# Patient Record
Sex: FEMALE | Race: BLACK OR AFRICAN AMERICAN | NOT HISPANIC OR LATINO | Employment: OTHER | URBAN - METROPOLITAN AREA
[De-identification: names, ages, dates, MRNs, and addresses within clinical notes are randomized per-mention and may not be internally consistent; named-entity substitution may affect disease eponyms.]

---

## 2018-01-18 ENCOUNTER — TELEPHONE (OUTPATIENT)
Dept: PLASTIC SURGERY | Facility: CLINIC | Age: 78
End: 2018-01-18

## 2018-01-18 NOTE — TELEPHONE ENCOUNTER
----- Message from Neva Godinez sent at 1/18/2018 11:45 AM CST -----  Contact: Ms.Jessica Aburto [facility] [phone] Ext [phone]   Caller states she would like to speak to nurse in reference to scheduling Appt. From referral please call Ms.Jessica Aburto [facility] back @ [phone] Ext [phone]  Thank You :)

## 2018-01-18 NOTE — TELEPHONE ENCOUNTER
Returned call to Dr Schmitt office, patient has a referral Dr Mcguire for a new breast lesion, referral scanned into system, patient does not have insurance updated in system at this time, will call patient to schedule per referral

## 2018-01-19 ENCOUNTER — TELEPHONE (OUTPATIENT)
Dept: PLASTIC SURGERY | Facility: CLINIC | Age: 78
End: 2018-01-19

## 2018-01-19 NOTE — TELEPHONE ENCOUNTER
Left voice message stating the office number and hours of operation to call for an appointment.    ----- Message from Yue Britton RN sent at 1/18/2018  2:30 PM CST -----  Regarding: call to schedule  Please call patient to schedule for a new breast lesion    Referral scanned into media    Need to verify patients insurance before scheduling

## 2018-01-31 ENCOUNTER — OFFICE VISIT (OUTPATIENT)
Dept: PLASTIC SURGERY | Facility: CLINIC | Age: 78
End: 2018-01-31
Payer: MEDICARE

## 2018-01-31 VITALS
TEMPERATURE: 99 F | RESPIRATION RATE: 18 BRPM | WEIGHT: 161.94 LBS | DIASTOLIC BLOOD PRESSURE: 66 MMHG | SYSTOLIC BLOOD PRESSURE: 118 MMHG | HEIGHT: 62 IN | BODY MASS INDEX: 29.8 KG/M2 | HEART RATE: 83 BPM

## 2018-01-31 DIAGNOSIS — L91.0 KELOID: Primary | ICD-10-CM

## 2018-01-31 PROCEDURE — 1159F MED LIST DOCD IN RCRD: CPT | Mod: ,,, | Performed by: SURGERY

## 2018-01-31 PROCEDURE — 99213 OFFICE O/P EST LOW 20 MIN: CPT | Mod: PBBFAC,PO | Performed by: SURGERY

## 2018-01-31 PROCEDURE — 99999 PR PBB SHADOW E&M-EST. PATIENT-LVL III: CPT | Mod: PBBFAC,,, | Performed by: SURGERY

## 2018-01-31 PROCEDURE — 99202 OFFICE O/P NEW SF 15 MIN: CPT | Mod: S$PBB,,, | Performed by: SURGERY

## 2018-01-31 PROCEDURE — 1126F AMNT PAIN NOTED NONE PRSNT: CPT | Mod: ,,, | Performed by: SURGERY

## 2018-01-31 RX ORDER — ESOMEPRAZOLE MAGNESIUM 40 MG/1
40 CAPSULE, DELAYED RELEASE ORAL
COMMUNITY
Start: 2018-01-10 | End: 2022-02-13

## 2018-01-31 RX ORDER — OMEPRAZOLE 40 MG/1
40 CAPSULE, DELAYED RELEASE ORAL DAILY
COMMUNITY
End: 2018-04-13

## 2018-01-31 RX ORDER — METFORMIN HYDROCHLORIDE 500 MG/1
500 TABLET ORAL 2 TIMES DAILY WITH MEALS
Status: ON HOLD | COMMUNITY
End: 2018-04-18

## 2018-01-31 RX ORDER — OLMESARTAN MEDOXOMIL / AMLODIPINE BESYLATE / HYDROCHLOROTHIAZIDE 40; 10; 12.5 MG/1; MG/1; MG/1
TABLET, FILM COATED ORAL DAILY
COMMUNITY
Start: 2018-01-10 | End: 2022-02-13

## 2018-01-31 RX ORDER — ETODOLAC 400 MG/1
400 TABLET, FILM COATED ORAL 2 TIMES DAILY
Status: ON HOLD | COMMUNITY
Start: 2018-01-10 | End: 2018-04-18 | Stop reason: HOSPADM

## 2018-01-31 RX ORDER — CLONIDINE HYDROCHLORIDE 0.1 MG/1
0.1 TABLET ORAL 2 TIMES DAILY
COMMUNITY

## 2018-01-31 RX ORDER — SIMVASTATIN 40 MG/1
40 TABLET, FILM COATED ORAL
COMMUNITY
End: 2018-04-13

## 2018-01-31 RX ORDER — TRAVOPROST 0.04 MG/ML
1 SOLUTION/ DROPS OPHTHALMIC 2 TIMES DAILY
COMMUNITY
Start: 2018-01-10

## 2018-01-31 NOTE — LETTER
January 31, 2018      Andrew Francisco IV, MD  [address]   [place] [address]           [facility] [place]  [address] [facility] [address]  Phone: [phone]  Fax: [phone]          Patient: Azul Clifford   MR Number: [id number]   YOB: 1940   Date of Visit: 1/31/2018       Dear Dr. Andrew Francisco IV:    Thank you for referring Azul Clifford to me for evaluation. Attached you will find relevant portions of my assessment and plan of care.    If you have questions, please do not hesitate to call me. I look forward to following Azul Clifford along with you.    Sincerely,    Ezekiel Mcguire MD    Enclosure  CC:  No Recipients    If you would like to receive this communication electronically, please contact [email or web address] or [phone] to request more information on [employer] Link access.    For providers and/or their staff who would like to refer a patient to [facility], please contact us through our one-stop-shop provider referral line, [facility] [place], at [phone].    If you feel you have received this communication in error or would no longer like to receive these types of communications, please e-mail [email or web address]

## 2018-01-31 NOTE — PROGRESS NOTES
Plastic Surgery History and Physical    HPI:  Azul Clifford 77 y.o. female here today to discuss keloids on the R breast. She doesn't remember any trauma to the area.       PMH:  No past medical history on file.    PSH:  No past surgical history on file.    SH:  Tobacco:   History   Smoking Status    Not on file   Smokeless Tobacco    Not on file     ETOH:   History   Alcohol use Not on file     Illicit Drugs:   History   Drug use: Unknown       Medications    Current Outpatient Prescriptions:     cloNIDine (CATAPRES) 0.1 MG tablet, Take 0.1 mg by mouth 2 (two) times daily., Disp: , Rfl:     esomeprazole (NEXIUM) 40 MG capsule, , Disp: , Rfl:     etodolac (LODINE) 400 MG tablet, , Disp: , Rfl:     metFORMIN (GLUCOPHAGE) 500 MG tablet, Take 500 mg by mouth., Disp: , Rfl:     olmesartan-amLODIPin-hcthiazid 40-10-12.5 mg Tab, , Disp: , Rfl:     omeprazole (PRILOSEC) 40 MG capsule, Take 40 mg by mouth once daily., Disp: , Rfl:     simvastatin (ZOCOR) 40 MG tablet, Take 40 mg by mouth., Disp: , Rfl:     TRAVATAN Z 0.004 % Drop, , Disp: , Rfl:     Physical Exam  General: NAD, Resting comfortably in bed  Chest: CTA Bl  Heart: RRR  Abd: Soft, NT, ND  Breast: R breast with two parallel keloids    Diagnostics:  No results found for: WBC, HGB, HCT, MCV, PLT  No results found for: CREATININE, BUN, NA, K, CL, CO2  No results found for: ALT, AST, GGT, ALKPHOS, BILITOT  No results found for: ALBUMIN      Assessment/Plan  Azul Clifford 77 y.o. female with     1. Will refer to rad onc for eval and then get her on the schedule.

## 2018-02-14 ENCOUNTER — TELEPHONE (OUTPATIENT)
Dept: PLASTIC SURGERY | Facility: CLINIC | Age: 78
End: 2018-02-14

## 2018-02-14 DIAGNOSIS — L91.0 KELOID SCAR: Primary | ICD-10-CM

## 2018-02-27 ENCOUNTER — ANESTHESIA EVENT (OUTPATIENT)
Dept: SURGERY | Facility: HOSPITAL | Age: 78
End: 2018-02-27
Payer: MEDICARE

## 2018-02-27 NOTE — PRE ADMISSION SCREENING
Anesthesia Assessment: Preoperative EQUATION    Planned Procedure: Procedure(s) (LRB):  EXCISION-KELOID (Right)  Requested Anesthesia Type:General/MAC  Surgeon: Ezekiel Mcguire MD  Service: Plastics  Known or anticipated Date of Surgery:3/13/2018    Surgeon notes: reviewed    Electronic QUestionnaire Assessment completed via nurse interview with patient.        No Aq        Triage considerations:     Previous anesthesia records:Not available (states eye surgery    Last PCP note: within 3 months , outside [facility] Dr. PARESH Carter  Subspecialty notes: pt has some records in care everwhere    Other important co-morbidities:   Skin lesion breast  Essential Htn  HLD  Arthritis  glaucoma     Tests already available:  No recent tests.            Instructions given. (See in Nurse's note)    Optimization:  Requesting records from PCP    Plan:    Testing:  Hemoglobin     Navigation: 77 yr old female lives alone in [place] Does not recognize all meds on MAR.  Requesting additional records from outside.

## 2018-02-27 NOTE — ANESTHESIA PREPROCEDURE EVALUATION
Anesthesia Assessment: Preoperative EQUATION     Planned Procedure: Procedure(s) (LRB):  EXCISION-KELOID (Right) (right axilla)  Requested Anesthesia Type:General/MAC  Surgeon: Ezekiel Mcguire MD  Service: Plastics  Known or anticipated Date of Surgery:3/13/2018     Surgeon notes: reviewed     Electronic QUestionnaire Assessment completed via nurse interview with patient.         No Aq           Triage considerations:      Previous anesthesia records:Not available (states eye surgery     Last PCP note: within 3 months , outside [facility] Dr. PARESH Carter  Subspecialty notes: pt has some records in care everwhere     Other important co-morbidities:   Skin lesion breast  Essential Htn  HLD  Arthritis  glaucoma   DM2    Tests already available:  No recent tests.                            Instructions given. (See in Nurse's note)     Optimization:  Requesting records from PCP         Plan:    Testing:  Hemoglobin     Navigation: 77 yr old female lives alone in [place] Does not recognize all meds on MAR.  Requesting additional records from outside.                                                                                                          02/27/2018  Azul Clifford is a 77 y.o., female     Other important co-morbidities:   Skin lesion breast  Essential Htn  HLD  Arthritis  glaucoma    Anesthesia Evaluation    I have reviewed the Patient Summary Reports.    I have reviewed the Nursing Notes.   I have reviewed the Medications.     Review of Systems  Anesthesia Hx:  No problems with previous Anesthesia  History of prior surgery of interest to airway management or planning: Previous anesthesia: MAC  Eye surgery / unknown with MAC.   Denies Personal Hx of Anesthesia complications.   Social:  No Alcohol Use, Non-Smoker    Hematology/Oncology:  Hematology Normal   Oncology Normal     EENT/Dental:   glaucoma   Cardiovascular:   Exercise tolerance: poor Hypertension hyperlipidemia     Pulmonary:  Pulmonary Normal    Renal/:  Renal/ Normal     Hepatic/GI:  Hepatic/GI Normal    Musculoskeletal:   Arthritis  States had some c/o neck pain, some trouble turning head   Neurological:  Neurology Normal    Endocrine:   Diabetes, type 2 Reported, pt does not check blood sugars   Dermatological:   Keloid scar   Psych:  Psychiatric Normal           Physical Exam  General:  Well nourished    Airway/Jaw/Neck:  Airway Findings: Mouth Opening: Normal Tongue: Normal  General Airway Assessment: Adult  Oropharynx Findings: Normal Mallampati: III  Improves to II with phonation.  TM Distance: Normal, at least 6 cm  Jaw/Neck Findings:  Neck ROM: Normal ROM  Neck Findings: Normal    Eyes/Ears/Nose:  EYES/EARS/NOSE FINDINGS: Normal   Dental:  Dental Findings: Edentulous   Chest/Lungs:  Chest/Lungs Findings: Normal Respiratory Rate     Heart/Vascular:  Heart Findings: Rate: Normal        Mental Status:  Mental Status Findings:  Cooperative, Alert and Oriented         Anesthesia Plan  Type of Anesthesia, risks & benefits discussed:  Anesthesia Type:  general  Patient's Preference:   Intra-op Monitoring Plan: standard ASA monitors  Intra-op Monitoring Plan Comments:   Post Op Pain Control Plan: per primary service following discharge from PACU, multimodal analgesia and IV/PO Opioids PRN  Post Op Pain Control Plan Comments:   Induction:   IV  Beta Blocker:  Patient is not currently on a Beta-Blocker (No further documentation required).       Informed Consent: Patient understands risks and agrees with Anesthesia plan.  Questions answered. Anesthesia consent signed with patient.  ASA Score: 2     Day of Surgery Review of History & Physical:    H&P update referred to the surgeon.     Anesthesia Plan Notes:   77F DM2, HTN, axillary keloid for resection under GA NC TIVA        Ready For Surgery From Anesthesia Perspective.

## 2018-03-01 ENCOUNTER — HOSPITAL ENCOUNTER (OUTPATIENT)
Dept: RADIATION THERAPY | Facility: HOSPITAL | Age: 78
Discharge: HOME | End: 2018-03-01
Attending: RADIOLOGY
Payer: MEDICARE

## 2018-03-07 ENCOUNTER — OFFICE VISIT (OUTPATIENT)
Dept: RADIATION ONCOLOGY | Facility: CLINIC | Age: 78
End: 2018-03-07
Attending: RADIOLOGY
Payer: MEDICARE

## 2018-03-07 VITALS
RESPIRATION RATE: 18 BRPM | DIASTOLIC BLOOD PRESSURE: 57 MMHG | BODY MASS INDEX: 29.28 KG/M2 | WEIGHT: 159.13 LBS | HEIGHT: 62 IN | SYSTOLIC BLOOD PRESSURE: 101 MMHG | HEART RATE: 88 BPM

## 2018-03-07 DIAGNOSIS — L91.0 KELOID CICATRIX: Primary | ICD-10-CM

## 2018-03-07 PROCEDURE — 99999 PR PBB SHADOW E&M-EST. PATIENT-LVL III: CPT | Mod: PBBFAC,,, | Performed by: RADIOLOGY

## 2018-03-07 PROCEDURE — 99213 OFFICE O/P EST LOW 20 MIN: CPT | Mod: PBBFAC | Performed by: RADIOLOGY

## 2018-03-07 PROCEDURE — 99201 PR OFFICE/OUTPT VISIT,NEW,LEVL I: CPT | Mod: S$PBB,,, | Performed by: RADIOLOGY

## 2018-03-07 RX ORDER — ASPIRIN 81 MG/1
81 TABLET ORAL DAILY
COMMUNITY

## 2018-03-07 NOTE — LETTER
March 7, 2018      Ezekiel Mcguire MD  [address]  [facility] [address]           [facility]  [address]  [facility] [address]  Phone: [phone]          Patient: Azul Clifford   MR Number: [id number]   YOB: 1940   Date of Visit: 3/7/2018       Dear Dr. Ezekiel Mcguire:    Thank you for referring Azul Clifford to me for evaluation. Attached you will find relevant portions of my assessment and plan of care.    If you have questions, please do not hesitate to call me. I look forward to following Azul Clifford along with you.    Sincerely,    Naresh Willams Jr., MD    Enclosure  CC:  No Recipients    If you would like to receive this communication electronically, please contact [email or web address] or [phone] to request more information on [employer] access.    For providers and/or their staff who would like to refer a patient to [facility], please contact us through our one-stop-shop provider referral line, [facility], at [phone].    If you feel you have received this communication in error or would no longer like to receive these types of communications, please e-mail [email or web address]

## 2018-03-07 NOTE — PROGRESS NOTES
Subjective:       Patient ID: Azul Clifford is a 77 y.o. female.    Chief Complaint: Keloid (discuss xrt-rt breast;sx 3/13)    This patient is referred for discussion of postoperative radiotherapy for prevention of keloid formation.      Mrs. Clifford as a history of keloid formation in the region of the Rt. axillary tail following treatment of abscess in the area.  The patient complains of pain with the scars and occasional drainage.  She is scheduled from removal of the scars and presents for discussion of postoperative radiotherapy.  Today, the patient states she feels well.        Review of Systems   Constitutional: Negative for activity change, appetite change and chills.   Skin: Negative for color change and pallor.       Objective:      Physical Exam   Constitutional: She appears well-developed and well-nourished. No distress.   Pulmonary/Chest:           Assessment:       1. Keloid cicatrix        Plan:       Discussed the rational for postoperative radiotherapy following resection of these scars.  Discussed the procedures, risks and benefits of therapy.  The patient was agreeable with postoperative radiotherapy.  Will plan 15 Gy in 3 fractions following resection.

## 2018-03-12 ENCOUNTER — TELEPHONE (OUTPATIENT)
Dept: PLASTIC SURGERY | Facility: CLINIC | Age: 78
End: 2018-03-12

## 2018-03-12 NOTE — TELEPHONE ENCOUNTER
Ms. Clifford was advised to report to 2nd floor DOSC at 0530 with a surgery start time of 0730. I discussed night before and morning of surgery instructions according to [facility] Surgery Guide. Ms. Clifford verbalized understanding and had no questions.

## 2018-03-13 ENCOUNTER — HOSPITAL ENCOUNTER (OUTPATIENT)
Facility: HOSPITAL | Age: 78
Discharge: HOME | End: 2018-03-13
Attending: SURGERY | Admitting: SURGERY
Payer: MEDICARE

## 2018-03-13 ENCOUNTER — ANESTHESIA (OUTPATIENT)
Dept: SURGERY | Facility: HOSPITAL | Age: 78
End: 2018-03-13
Payer: MEDICARE

## 2018-03-13 ENCOUNTER — SURGERY (OUTPATIENT)
Age: 78
End: 2018-03-13

## 2018-03-13 VITALS
HEART RATE: 84 BPM | OXYGEN SATURATION: 99 % | SYSTOLIC BLOOD PRESSURE: 125 MMHG | TEMPERATURE: 99 F | WEIGHT: 164 LBS | HEIGHT: 62 IN | DIASTOLIC BLOOD PRESSURE: 71 MMHG | BODY MASS INDEX: 30.18 KG/M2 | RESPIRATION RATE: 16 BRPM

## 2018-03-13 DIAGNOSIS — L91.0 KELOID OF SKIN: Primary | ICD-10-CM

## 2018-03-13 LAB — POCT GLUCOSE: 124 MG/DL (ref 70–110)

## 2018-03-13 PROCEDURE — 88304 TISSUE EXAM BY PATHOLOGIST: CPT

## 2018-03-13 PROCEDURE — 36000707: Performed by: SURGERY

## 2018-03-13 PROCEDURE — S0077 INJECTION, CLINDAMYCIN PHOSP: HCPCS | Performed by: SURGERY

## 2018-03-13 PROCEDURE — 77300 RADIATION THERAPY DOSE PLAN: CPT | Mod: TC | Performed by: RADIOLOGY

## 2018-03-13 PROCEDURE — 63600175 PHARM REV CODE 636 W HCPCS: Performed by: ANESTHESIOLOGY

## 2018-03-13 PROCEDURE — 71000015 HC POSTOP RECOV 1ST HR: Performed by: SURGERY

## 2018-03-13 PROCEDURE — 77261 THER RADIOLOGY TX PLNG SMPL: CPT | Mod: ,,, | Performed by: RADIOLOGY

## 2018-03-13 PROCEDURE — 77280 THER RAD SIMULAJ FIELD SMPL: CPT | Mod: TC | Performed by: RADIOLOGY

## 2018-03-13 PROCEDURE — 37000008 HC ANESTHESIA 1ST 15 MINUTES: Performed by: SURGERY

## 2018-03-13 PROCEDURE — 12034 INTMD RPR S/TR/EXT 7.6-12.5: CPT | Mod: 51,,, | Performed by: SURGERY

## 2018-03-13 PROCEDURE — 88304 TISSUE EXAM BY PATHOLOGIST: CPT | Mod: 26,,,

## 2018-03-13 PROCEDURE — 11406 EXC TR-EXT B9+MARG >4.0 CM: CPT | Mod: ,,, | Performed by: SURGERY

## 2018-03-13 PROCEDURE — 77412 RADIATION TX DELIVERY LVL 3: CPT | Performed by: RADIOLOGY

## 2018-03-13 PROCEDURE — 25000003 PHARM REV CODE 250: Performed by: SURGERY

## 2018-03-13 PROCEDURE — 77300 RADIATION THERAPY DOSE PLAN: CPT | Mod: 26,,, | Performed by: RADIOLOGY

## 2018-03-13 PROCEDURE — 25000003 PHARM REV CODE 250: Performed by: ANESTHESIOLOGY

## 2018-03-13 PROCEDURE — 36000706: Performed by: SURGERY

## 2018-03-13 PROCEDURE — 11401 EXC TR-EXT B9+MARG 0.6-1 CM: CPT | Mod: 51,,, | Performed by: SURGERY

## 2018-03-13 PROCEDURE — 82962 GLUCOSE BLOOD TEST: CPT | Performed by: SURGERY

## 2018-03-13 PROCEDURE — 77280 THER RAD SIMULAJ FIELD SMPL: CPT | Mod: 26,,, | Performed by: RADIOLOGY

## 2018-03-13 PROCEDURE — 77332 RADIATION TREATMENT AID(S): CPT | Mod: 26,,, | Performed by: RADIOLOGY

## 2018-03-13 PROCEDURE — 37000009 HC ANESTHESIA EA ADD 15 MINS: Performed by: SURGERY

## 2018-03-13 PROCEDURE — D9220A PRA ANESTHESIA: Mod: ,,, | Performed by: ANESTHESIOLOGY

## 2018-03-13 PROCEDURE — 77332 RADIATION TREATMENT AID(S): CPT | Mod: TC | Performed by: RADIOLOGY

## 2018-03-13 RX ORDER — CLINDAMYCIN PHOSPHATE 900 MG/50ML
900 INJECTION, SOLUTION INTRAVENOUS
Status: COMPLETED | OUTPATIENT
Start: 2018-03-13 | End: 2018-03-13

## 2018-03-13 RX ORDER — OXYCODONE AND ACETAMINOPHEN 10; 325 MG/1; MG/1
1 TABLET ORAL EVERY 4 HOURS PRN
Qty: 28 TABLET | Refills: 0 | Status: SHIPPED | OUTPATIENT
Start: 2018-03-13 | End: 2018-04-13

## 2018-03-13 RX ORDER — ONDANSETRON 2 MG/ML
INJECTION INTRAMUSCULAR; INTRAVENOUS
Status: DISCONTINUED | OUTPATIENT
Start: 2018-03-13 | End: 2018-03-13

## 2018-03-13 RX ORDER — PROPOFOL 10 MG/ML
VIAL (ML) INTRAVENOUS CONTINUOUS PRN
Status: DISCONTINUED | OUTPATIENT
Start: 2018-03-13 | End: 2018-03-13

## 2018-03-13 RX ORDER — SODIUM CHLORIDE 0.9 % (FLUSH) 0.9 %
3 SYRINGE (ML) INJECTION
Status: DISCONTINUED | OUTPATIENT
Start: 2018-03-13 | End: 2018-03-13 | Stop reason: HOSPADM

## 2018-03-13 RX ORDER — LIDOCAINE HYDROCHLORIDE 10 MG/ML
1 INJECTION, SOLUTION EPIDURAL; INFILTRATION; INTRACAUDAL; PERINEURAL ONCE
Status: DISCONTINUED | OUTPATIENT
Start: 2018-03-13 | End: 2018-03-13 | Stop reason: HOSPADM

## 2018-03-13 RX ORDER — LIDOCAINE HYDROCHLORIDE 10 MG/ML
5 INJECTION, SOLUTION EPIDURAL; INFILTRATION; INTRACAUDAL; PERINEURAL ONCE
Status: DISCONTINUED | OUTPATIENT
Start: 2018-03-13 | End: 2018-03-13 | Stop reason: HOSPADM

## 2018-03-13 RX ORDER — FENTANYL CITRATE 50 UG/ML
25 INJECTION, SOLUTION INTRAMUSCULAR; INTRAVENOUS EVERY 5 MIN PRN
Status: DISCONTINUED | OUTPATIENT
Start: 2018-03-13 | End: 2018-03-13 | Stop reason: HOSPADM

## 2018-03-13 RX ORDER — SODIUM CHLORIDE 9 MG/ML
INJECTION, SOLUTION INTRAVENOUS CONTINUOUS
Status: DISCONTINUED | OUTPATIENT
Start: 2018-03-13 | End: 2018-03-13 | Stop reason: HOSPADM

## 2018-03-13 RX ORDER — LIDOCAINE HYDROCHLORIDE AND EPINEPHRINE 10; 10 MG/ML; UG/ML
INJECTION, SOLUTION INFILTRATION; PERINEURAL
Status: DISCONTINUED | OUTPATIENT
Start: 2018-03-13 | End: 2018-03-13 | Stop reason: HOSPADM

## 2018-03-13 RX ORDER — PROPOFOL 10 MG/ML
INJECTION, EMULSION INTRAVENOUS
Status: DISCONTINUED | OUTPATIENT
Start: 2018-03-13 | End: 2018-03-13

## 2018-03-13 RX ORDER — CLINDAMYCIN HYDROCHLORIDE 300 MG/1
300 CAPSULE ORAL EVERY 6 HOURS
Qty: 30 CAPSULE | Refills: 0 | Status: ON HOLD | OUTPATIENT
Start: 2018-03-13 | End: 2018-04-18 | Stop reason: HOSPADM

## 2018-03-13 RX ORDER — FENTANYL CITRATE 50 UG/ML
INJECTION, SOLUTION INTRAMUSCULAR; INTRAVENOUS
Status: DISCONTINUED | OUTPATIENT
Start: 2018-03-13 | End: 2018-03-13

## 2018-03-13 RX ADMIN — PROPOFOL 30 MG: 10 INJECTION, EMULSION INTRAVENOUS at 07:03

## 2018-03-13 RX ADMIN — FENTANYL CITRATE 50 MCG: 50 INJECTION, SOLUTION INTRAMUSCULAR; INTRAVENOUS at 07:03

## 2018-03-13 RX ADMIN — SODIUM CHLORIDE: 0.9 INJECTION, SOLUTION INTRAVENOUS at 06:03

## 2018-03-13 RX ADMIN — METOPROLOL TARTRATE 1 MG: 1 INJECTION, SOLUTION INTRAVENOUS at 07:03

## 2018-03-13 RX ADMIN — PROPOFOL 100 MCG/KG/MIN: 10 INJECTION, EMULSION INTRAVENOUS at 07:03

## 2018-03-13 RX ADMIN — FENTANYL CITRATE 25 MCG: 50 INJECTION, SOLUTION INTRAMUSCULAR; INTRAVENOUS at 07:03

## 2018-03-13 RX ADMIN — LIDOCAINE HYDROCHLORIDE AND EPINEPHRINE 10 ML: 10; 10 INJECTION, SOLUTION INFILTRATION; PERINEURAL at 07:03

## 2018-03-13 RX ADMIN — ONDANSETRON 4 MG: 2 INJECTION INTRAMUSCULAR; INTRAVENOUS at 07:03

## 2018-03-13 RX ADMIN — PROPOFOL 20 MG: 10 INJECTION, EMULSION INTRAVENOUS at 07:03

## 2018-03-13 RX ADMIN — CLINDAMYCIN PHOSPHATE 900 MG: 18 INJECTION, SOLUTION INTRAVENOUS at 07:03

## 2018-03-13 NOTE — TRANSFER OF CARE
"Anesthesia Transfer of Care Note    Patient: Azul Clifford    Procedure(s) Performed: Procedure(s) (LRB):  EXCISION-KELOID (Right)    Patient location: [facility]    Anesthesia Type: MAC    Transport from OR: Transported from OR on room air with adequate spontaneous ventilation    Post pain: adequate analgesia    Post assessment: no apparent anesthetic complications    Post vital signs: stable    Level of consciousness: awake and alert    Nausea/Vomiting: no nausea/vomiting    Complications: none    Transfer of care protocol was followed      Last vitals:   Visit Vitals  BP (!) 156/76 (BP Location: Left arm, Patient Position: Lying)   Pulse 72   Temp 36.4 °C (97.6 °F) (Oral)   Resp 20   Ht 5' 2" (1.575 m)   Wt 74.4 kg (164 lb)   SpO2 100%   Breastfeeding? No   BMI 30.00 kg/m²     "

## 2018-03-13 NOTE — PROGRESS NOTES
Nurse called radiation to make sure patient did not need an iv, radiation nurse said no iv needed. Family at bedside. Patient being dc to radiation with family with her.

## 2018-03-13 NOTE — OP NOTE
DATE OF PROCEDURE:  03/13/2018    POSTOPERATIVE DIAGNOSIS:  Painful keloids of the right breast x2.    POSTOPERATIVE DIAGNOSES:  1.  Painful keloids of the right breast x2.  2.  A mole measuring 1 x 1 cm of the right breast.    PROCEDURE PERFORMED:  1.  Excision of mole of the right breast with primary closure.  Final incision   length is 1 cm.  2.  Wide excision of keloids x2 of the right breast with layered closure.  Final   incision length measures approximately 8 cm.    SURGEON:  Ezekiel Mcguire M.D., [facility]    ANESTHESIA:  MAC with local infiltration.    DESCRIPTION OF PROCEDURE:  The patient was evaluated in the preoperative holding   area.  She had two keloids adjacent to each other in the right breast as well   as a large mole lateral to this.  Due to the fact that the patient was going to   undergo radiation afterwards, she requested that the mole, which would be in the   field of radiation be excised as well.  The patient was taken to the Operative   Room, placed in the supine position.  After adequate intravenous sedation, she   was prepped and draped in normal sterile fashion.  The two keloids were excised   as in one specimen.  Next, the incision was closed in layers using interrupted   3-0 Monocryl followed by running 4-0 Monocryl subcuticular suture.  Several 5-0   nylons were also used.  Mole was then excised and closed using interrupted 4-0   nylon.  There were no complications with this procedure.  Blood loss was   minimal.      CRB/IN  dd: 03/13/2018 11:40:07 (CDT)  td: 03/13/2018 16:59:14 (CDT)  Doc ID   #[id number]  Job ID #[id number]    CC:

## 2018-03-13 NOTE — H&P
Plastic Surgery History and Physical    HPI:  Azul Clifford 77 y.o. female with keloid of R breast.       PMH:  Past Medical History:   Diagnosis Date    Arthritis     DM2 (diabetes mellitus, type 2)     Glaucoma capsular     HLD (hyperlipidemia)     Hypertension     Kidney stones     Skin lesion of breast        PSH:  Past Surgical History:   Procedure Laterality Date    EYE SURGERY      URETERAL STENT PLACEMENT      and removed       SH:  Tobacco:   History   Smoking Status    Former Smoker   Smokeless Tobacco    Never Used     ETOH:   History   Alcohol Use No     Illicit Drugs:   History   Drug Use No       Medications    Current Facility-Administered Medications:     0.9%  NaCl infusion, , Intravenous, Continuous, Abeba Shore MD    clindamycin 900 MG/50 ML D5W 900 mg/50 mL IVPB 900 mg, 900 mg, Intravenous, On Call Procedure, Saúl Persaud MD    lidocaine (PF) 10 mg/ml (1%) injection 10 mg, 1 mL, Intradermal, Once, Saúl Persaud MD    lidocaine (PF) 10 mg/ml (1%) injection 50 mg, 5 mL, Intradermal, Once, Abeba Shore MD    Physical Exam  General: NAD, Resting comfortably in bed  Chest: CTA Bl  Heart: RRR  Abd: Soft, NT, ND      Diagnostics:  No results found for: WBC, HGB, HCT, MCV, PLT  No results found for: CREATININE, BUN, NA, K, CL, CO2  No results found for: ALT, AST, GGT, ALKPHOS, BILITOT  No results found for: ALBUMIN      Assessment/Plan  Azul Clifford 77 y.o. female with     1. Here today for removal of keloid and planned for radiation afterward.

## 2018-03-13 NOTE — BRIEF OP NOTE
[facility]  Brief Operative Note     SUMMARY     Surgery Date: 3/13/2018     Surgeon(s) and Role:     * Ezekiel Mcguire MD - Primary     * Saúl Persaud MD - Fellow    Assisting Surgeon: None    Pre-op Diagnosis:  Keloid scar [L91.0]    Post-op Diagnosis:  Post-Op Diagnosis Codes:     * Keloid scar [L91.0]    Procedure(s) (LRB):  EXCISION-KELOID (Right)    Anesthesia: General/MAC    Description of the findings of the procedure: keloid x 2 in R axilla, skin tag    Findings/Key Components: as above    Estimated Blood Loss: * No values recorded between 3/13/2018  7:56 AM and 3/13/2018  8:24 AM *         Specimens:   Specimen (12h ago through future)    Start     Ordered    03/13/18 0805  Specimen to Pathology - Surgery  Once     Comments:  1.) Right axilla keloid- Permanent.      03/13/18 0804          Discharge Note    SUMMARY     Admit Date: 3/13/2018    Discharge Date and Time:  03/13/2018 8:30 AM    Hospital Course (synopsis of major diagnoses, care, treatment, and services provided during the course of the hospital stay): pt admitted for surgery, underwent mac and did well and discharged today for radiation.      Final Diagnosis: Post-Op Diagnosis Codes:     * Keloid scar [L91.0]    Disposition: Home or Self Care    Follow Up/Patient Instructions:     Medications:  Reconciled Home Medications:   Current Discharge Medication List      START taking these medications    Details   clindamycin (CLEOCIN) 300 MG capsule Take 1 capsule (300 mg total) by mouth every 6 (six) hours.  Qty: 30 capsule, Refills: 0      oxyCODONE-acetaminophen (PERCOCET)  mg per tablet Take 1 tablet by mouth every 4 (four) hours as needed for Pain.  Qty: 28 tablet, Refills: 0         CONTINUE these medications which have NOT CHANGED    Details   cloNIDine (CATAPRES) 0.1 MG tablet Take 0.1 mg by mouth 2 (two) times daily.      esomeprazole (NEXIUM) 40 MG capsule       etodolac (LODINE) 400 MG tablet Take 400  mg by mouth 2 (two) times daily.       metFORMIN (GLUCOPHAGE) 500 MG tablet Take 500 mg by mouth 2 (two) times daily with meals.       olmesartan-amLODIPin-hcthiazid 40-10-12.5 mg Tab Take by mouth once daily.       TRAVATAN Z 0.004 % Drop 1 drop 2 (two) times daily.       aspirin (ECOTRIN) 81 MG EC tablet Take 81 mg by mouth once daily.      omeprazole (PRILOSEC) 40 MG capsule Take 40 mg by mouth once daily.      simvastatin (ZOCOR) 40 MG tablet Take 40 mg by mouth.             Discharge Procedure Orders  Diet diabetic - ADA 2400 kcal     Call MD for:  temperature >100.4     Call MD for:  persistent nausea and vomiting     Call MD for:  severe uncontrolled pain     Call MD for:  difficulty breathing, headache or visual disturbances     Call MD for:  redness, tenderness, or signs of infection (pain, swelling, redness, odor or green/yellow discharge around incision site)     Call MD for:  hives     Call MD for:  persistent dizziness or light-headedness     Call MD for:  extreme fatigue     Activity as tolerated       Follow-up Information     Ezekiel Mcguire MD In 1 week.    Specialty:  Plastic Surgery  Contact information:  [address] [facility] [id number] [phone]

## 2018-03-14 NOTE — ANESTHESIA POSTPROCEDURE EVALUATION
"Anesthesia Post Evaluation    Patient: Azul Clifford    Procedure(s) Performed: Procedure(s) (LRB):  EXCISION-KELOID (Right)    Final Anesthesia Type: general  Patient location during evaluation: PACU  Patient participation: Yes- Able to Participate  Level of consciousness: awake and alert  Pain management: adequate  Airway patency: patent  PONV status at discharge: No PONV  Anesthetic complications: no      Cardiovascular status: blood pressure returned to baseline  Respiratory status: unassisted, spontaneous ventilation and room air  Hydration status: euvolemic  Follow-up not needed.        Visit Vitals  /71   Pulse 84   Temp 37 °C (98.6 °F) (Temporal)   Resp 16   Ht 5' 2" (1.575 m)   Wt 74.4 kg (164 lb)   SpO2 99%   Breastfeeding? No   BMI 30.00 kg/m²       Pain/Santosh Score: Pain Assessment Performed: Yes (3/13/2018  9:14 AM)  Presence of Pain: denies (3/13/2018  9:14 AM)  Santosh Score: 10 (3/13/2018  9:14 AM)      "

## 2018-03-15 PROCEDURE — 77263 THER RADIOLOGY TX PLNG CPLX: CPT | Mod: ,,, | Performed by: RADIOLOGY

## 2018-03-21 ENCOUNTER — OFFICE VISIT (OUTPATIENT)
Dept: PLASTIC SURGERY | Facility: CLINIC | Age: 78
End: 2018-03-21
Payer: MEDICARE

## 2018-03-21 VITALS
HEART RATE: 80 BPM | HEIGHT: 62 IN | DIASTOLIC BLOOD PRESSURE: 85 MMHG | SYSTOLIC BLOOD PRESSURE: 135 MMHG | BODY MASS INDEX: 30.18 KG/M2 | TEMPERATURE: 98 F | WEIGHT: 164 LBS

## 2018-03-21 DIAGNOSIS — Z09 SURGERY FOLLOW-UP EXAMINATION: Primary | ICD-10-CM

## 2018-03-21 PROCEDURE — 77412 RADIATION TX DELIVERY LVL 3: CPT | Performed by: RADIOLOGY

## 2018-03-21 PROCEDURE — 99024 POSTOP FOLLOW-UP VISIT: CPT | Mod: POP,,, | Performed by: SURGERY

## 2018-03-21 PROCEDURE — 99999 PR PBB SHADOW E&M-EST. PATIENT-LVL III: CPT | Mod: PBBFAC,,, | Performed by: SURGERY

## 2018-03-21 PROCEDURE — 99213 OFFICE O/P EST LOW 20 MIN: CPT | Mod: PBBFAC,PO,25 | Performed by: SURGERY

## 2018-03-21 NOTE — PROGRESS NOTES
HPI:  The patient is status post-keloid excision on 3/13/18 who has completed her post-op radiation course. Her pain is well controlled, her incision is CDI.    PHYSICAL EXAM:  Physical Exam   Constitutional: She is oriented to person, place, and time. She appears well-developed and well-nourished. No distress.   HENT:   Head: Normocephalic and atraumatic.   Eyes: EOM are normal. Pupils are equal, round, and reactive to light.   Neck: Normal range of motion. Neck supple.   Cardiovascular: Normal rate and regular rhythm.    Pulmonary/Chest: Effort normal. No respiratory distress.   Abdominal: Soft. She exhibits no distension.   Musculoskeletal: Normal range of motion.   Neurological: She is alert and oriented to person, place, and time.   Skin: Skin is warm and dry. She is not diaphoretic.   Incision on right breast is CDI, no erythema, swelling, or drainage   Psychiatric: She has a normal mood and affect. Her behavior is normal. Judgment and thought content normal.   Nursing note and vitals reviewed.      ASSESSMENT:    The patient is doing well after surgery.     PLAN:    Follow up in 2 weeks

## 2018-04-02 ENCOUNTER — HOSPITAL ENCOUNTER (OUTPATIENT)
Dept: RADIATION THERAPY | Facility: HOSPITAL | Age: 78
Discharge: HOME | End: 2018-04-02
Attending: RADIOLOGY
Payer: MEDICARE

## 2018-04-11 ENCOUNTER — OFFICE VISIT (OUTPATIENT)
Dept: PLASTIC SURGERY | Facility: CLINIC | Age: 78
End: 2018-04-11
Payer: MEDICARE

## 2018-04-11 VITALS
DIASTOLIC BLOOD PRESSURE: 67 MMHG | TEMPERATURE: 99 F | SYSTOLIC BLOOD PRESSURE: 96 MMHG | HEART RATE: 91 BPM | WEIGHT: 155.56 LBS | HEIGHT: 62 IN | BODY MASS INDEX: 28.63 KG/M2

## 2018-04-11 DIAGNOSIS — Z09 SURGERY FOLLOW-UP EXAMINATION: Primary | ICD-10-CM

## 2018-04-11 PROCEDURE — 99999 PR PBB SHADOW E&M-EST. PATIENT-LVL III: CPT | Mod: PBBFAC,,, | Performed by: PHYSICIAN ASSISTANT

## 2018-04-11 PROCEDURE — 99213 OFFICE O/P EST LOW 20 MIN: CPT | Mod: PBBFAC,PO | Performed by: PHYSICIAN ASSISTANT

## 2018-04-11 PROCEDURE — 99024 POSTOP FOLLOW-UP VISIT: CPT | Mod: POP,,, | Performed by: PHYSICIAN ASSISTANT

## 2018-04-11 RX ORDER — ESOMEPRAZOLE MAGNESIUM 40 MG/1
GRANULE, DELAYED RELEASE ORAL
COMMUNITY
Start: 2018-03-14 | End: 2018-04-13

## 2018-04-11 NOTE — PROGRESS NOTES
Azul Clifford presents to [facility] on 4/11/2018 for a follow up visit status post exc of keloid x 2 and nevus of R breast on 03/13/2018 with post op XRT. She is doing well today with no issues since her last visit    Review of patient's allergies indicates:  No Known Allergies  Current Outpatient Prescriptions on File Prior to Visit   Medication Sig Dispense Refill    aspirin (ECOTRIN) 81 MG EC tablet Take 81 mg by mouth once daily.      clindamycin (CLEOCIN) 300 MG capsule Take 1 capsule (300 mg total) by mouth every 6 (six) hours. 30 capsule 0    cloNIDine (CATAPRES) 0.1 MG tablet Take 0.1 mg by mouth 2 (two) times daily.      esomeprazole (NEXIUM) 40 MG capsule       etodolac (LODINE) 400 MG tablet Take 400 mg by mouth 2 (two) times daily.       metFORMIN (GLUCOPHAGE) 500 MG tablet Take 500 mg by mouth 2 (two) times daily with meals.       olmesartan-amLODIPin-hcthiazid 40-10-12.5 mg Tab Take by mouth once daily.       omeprazole (PRILOSEC) 40 MG capsule Take 40 mg by mouth once daily.      oxyCODONE-acetaminophen (PERCOCET)  mg per tablet Take 1 tablet by mouth every 4 (four) hours as needed for Pain. 28 tablet 0    simvastatin (ZOCOR) 40 MG tablet Take 40 mg by mouth.      TRAVATAN Z 0.004 % Drop 1 drop 2 (two) times daily.        No current facility-administered medications on file prior to visit.      Patient Active Problem List   Diagnosis    Keloid of skin     Past Surgical History:   Procedure Laterality Date    EYE SURGERY      URETERAL STENT PLACEMENT      and removed     PHYSICAL EXAMINATION  WD WN NAD  VSS  Normal resp effort  R breast - incisions CDI, no erythema/drainage, no sign of keloid recurrence    ASSESSMENT/PLAN  78 y.o. F s/p exc keloid x 2 and nevus of R breast  - Doing well, no issues  - All sutures removed  - Advised to apply moisturizer to irradiated area  - RTC x 6 weeks, appt scheduled    All questions were answered. The patient was advised to call the  clinic with any questions or concerns prior to their next visit.

## 2018-04-13 PROBLEM — E11.9 DM2 (DIABETES MELLITUS, TYPE 2): Status: ACTIVE | Noted: 2018-04-13

## 2018-04-13 PROBLEM — I82.419 DVT, FEMORAL, ACUTE: Status: ACTIVE | Noted: 2018-04-13

## 2018-04-13 PROBLEM — R06.02 SHORTNESS OF BREATH: Status: ACTIVE | Noted: 2018-04-13

## 2018-04-13 PROBLEM — I10 HYPERTENSION: Status: ACTIVE | Noted: 2018-04-13

## 2018-04-14 PROBLEM — I26.99 ACUTE PULMONARY EMBOLISM: Status: ACTIVE | Noted: 2018-04-14

## 2018-05-22 ENCOUNTER — TELEPHONE (OUTPATIENT)
Dept: PLASTIC SURGERY | Facility: CLINIC | Age: 78
End: 2018-05-22

## 2018-05-23 ENCOUNTER — OFFICE VISIT (OUTPATIENT)
Dept: PLASTIC SURGERY | Facility: CLINIC | Age: 78
End: 2018-05-23
Payer: MEDICARE

## 2018-05-23 VITALS
BODY MASS INDEX: 28.61 KG/M2 | TEMPERATURE: 98 F | HEIGHT: 62 IN | DIASTOLIC BLOOD PRESSURE: 73 MMHG | HEART RATE: 73 BPM | SYSTOLIC BLOOD PRESSURE: 137 MMHG | WEIGHT: 155.44 LBS

## 2018-05-23 DIAGNOSIS — Z09 SURGERY FOLLOW-UP EXAMINATION: Primary | ICD-10-CM

## 2018-05-23 PROCEDURE — 99999 PR PBB SHADOW E&M-EST. PATIENT-LVL III: CPT | Mod: PBBFAC,,, | Performed by: SURGERY

## 2018-05-23 PROCEDURE — 99213 OFFICE O/P EST LOW 20 MIN: CPT | Mod: PBBFAC,PO | Performed by: SURGERY

## 2018-05-23 PROCEDURE — 99024 POSTOP FOLLOW-UP VISIT: CPT | Mod: POP,,, | Performed by: SURGERY

## 2018-05-23 NOTE — PROGRESS NOTES
[facility]ul [facility] for a follow up visit status post exc of keloid x 2 and nevus of R breast on 03/13/2018 with post op XRT. She is doing well today with no issues since her last visit    Review of patient's allergies indicates:  No Known Allergies  Current Outpatient Prescriptions on File Prior to Visit   Medication Sig Dispense Refill    apixaban 5 mg Tab Take 1 tablet (5 mg total) by mouth 2 (two) times daily. 60 tablet 5    aspirin (ECOTRIN) 81 MG EC tablet Take 81 mg by mouth once daily.      cloNIDine (CATAPRES) 0.1 MG tablet Take 0.1 mg by mouth 2 (two) times daily.      esomeprazole (NEXIUM) 40 MG capsule Take 40 mg by mouth as needed.       metFORMIN (GLUCOPHAGE) 500 MG tablet Take 1 tablet (500 mg total) by mouth 2 (two) times daily with meals. On hold during hospital as of April 18th because of low blood sugars, please reassess with pcp      olmesartan-amLODIPin-hcthiazid 40-10-12.5 mg Tab Take by mouth once daily.       TRAVATAN Z 0.004 % Drop 1 drop 2 (two) times daily.        No current facility-administered medications on file prior to visit.      Patient Active Problem List   Diagnosis    Keloid of skin    DVT, femoral, acute    DM2 (diabetes mellitus, type 2)    Hypertension    Acute pulmonary embolism     Past Surgical History:   Procedure Laterality Date    BREAST SURGERY      removed lump to right breast     EYE SURGERY      URETERAL STENT PLACEMENT      and removed     PHYSICAL EXAMINATION  WD WN NAD  VSS  Normal resp effort  R breast - incisions CDI, no erythema/drainage, no sign of keloid recurrence    ASSESSMENT/PLAN  78 y.o. F s/p exc keloid x 2 and nevus of R breast  - Doing well, no issues or evidence of recurrence   - apply moisturizer to irradiated area as needed   - RTC 6mo    All questions were answered. The patient was advised to call the clinic with any questions or concerns prior to their next visit.

## 2020-11-20 ENCOUNTER — LAB VISIT (OUTPATIENT)
Dept: LAB | Facility: OTHER | Age: 80
End: 2020-11-20
Payer: MEDICARE

## 2020-11-20 DIAGNOSIS — Z03.818 ENCOUNTER FOR OBSERVATION FOR SUSPECTED EXPOSURE TO OTHER BIOLOGICAL AGENTS RULED OUT: ICD-10-CM

## 2020-11-20 PROCEDURE — U0003 INFECTIOUS AGENT DETECTION BY NUCLEIC ACID (DNA OR RNA); SEVERE ACUTE RESPIRATORY SYNDROME CORONAVIRUS 2 (SARS-COV-2) (CORONAVIRUS DISEASE [COVID-19]), AMPLIFIED PROBE TECHNIQUE, MAKING USE OF HIGH THROUGHPUT TECHNOLOGIES AS DESCRIBED BY CMS-2020-01-R: HCPCS

## 2020-11-24 LAB — SARS-COV-2 RNA RESP QL NAA+PROBE: NOT DETECTED

## 2021-03-02 ENCOUNTER — OFFICE VISIT (OUTPATIENT)
Dept: ORTHOPEDICS | Facility: CLINIC | Age: 81
End: 2021-03-02
Payer: MEDICARE

## 2021-03-02 VITALS
SYSTOLIC BLOOD PRESSURE: 92 MMHG | HEART RATE: 89 BPM | WEIGHT: 153 LBS | DIASTOLIC BLOOD PRESSURE: 64 MMHG | HEIGHT: 62 IN | BODY MASS INDEX: 28.16 KG/M2

## 2021-03-02 DIAGNOSIS — M19.011 ARTHRITIS OF RIGHT ACROMIOCLAVICULAR JOINT: ICD-10-CM

## 2021-03-02 DIAGNOSIS — M17.11 PRIMARY OSTEOARTHRITIS OF RIGHT KNEE: Primary | ICD-10-CM

## 2021-03-02 PROCEDURE — 1125F PR PAIN SEVERITY QUANTIFIED, PAIN PRESENT: ICD-10-PCS | Mod: S$GLB,,, | Performed by: ORTHOPAEDIC SURGERY

## 2021-03-02 PROCEDURE — 3288F FALL RISK ASSESSMENT DOCD: CPT | Mod: S$GLB,,, | Performed by: ORTHOPAEDIC SURGERY

## 2021-03-02 PROCEDURE — 20610 LARGE JOINT ASPIRATION/INJECTION: R KNEE: ICD-10-PCS | Mod: RT,S$GLB,, | Performed by: ORTHOPAEDIC SURGERY

## 2021-03-02 PROCEDURE — 3078F PR MOST RECENT DIASTOLIC BLOOD PRESSURE < 80 MM HG: ICD-10-PCS | Mod: S$GLB,,, | Performed by: ORTHOPAEDIC SURGERY

## 2021-03-02 PROCEDURE — 1101F PR PT FALLS ASSESS DOC 0-1 FALLS W/OUT INJ PAST YR: ICD-10-PCS | Mod: S$GLB,,, | Performed by: ORTHOPAEDIC SURGERY

## 2021-03-02 PROCEDURE — 1101F PT FALLS ASSESS-DOCD LE1/YR: CPT | Mod: S$GLB,,, | Performed by: ORTHOPAEDIC SURGERY

## 2021-03-02 PROCEDURE — 99203 OFFICE O/P NEW LOW 30 MIN: CPT | Mod: 25,S$GLB,, | Performed by: ORTHOPAEDIC SURGERY

## 2021-03-02 PROCEDURE — 3074F SYST BP LT 130 MM HG: CPT | Mod: S$GLB,,, | Performed by: ORTHOPAEDIC SURGERY

## 2021-03-02 PROCEDURE — 1159F MED LIST DOCD IN RCRD: CPT | Mod: S$GLB,,, | Performed by: ORTHOPAEDIC SURGERY

## 2021-03-02 PROCEDURE — 99203 PR OFFICE/OUTPT VISIT, NEW, LEVL III, 30-44 MIN: ICD-10-PCS | Mod: 25,S$GLB,, | Performed by: ORTHOPAEDIC SURGERY

## 2021-03-02 PROCEDURE — 3288F PR FALLS RISK ASSESSMENT DOCUMENTED: ICD-10-PCS | Mod: S$GLB,,, | Performed by: ORTHOPAEDIC SURGERY

## 2021-03-02 PROCEDURE — 20610 DRAIN/INJ JOINT/BURSA W/O US: CPT | Mod: RT,S$GLB,, | Performed by: ORTHOPAEDIC SURGERY

## 2021-03-02 PROCEDURE — 1125F AMNT PAIN NOTED PAIN PRSNT: CPT | Mod: S$GLB,,, | Performed by: ORTHOPAEDIC SURGERY

## 2021-03-02 PROCEDURE — 1159F PR MEDICATION LIST DOCUMENTED IN MEDICAL RECORD: ICD-10-PCS | Mod: S$GLB,,, | Performed by: ORTHOPAEDIC SURGERY

## 2021-03-02 PROCEDURE — 3078F DIAST BP <80 MM HG: CPT | Mod: S$GLB,,, | Performed by: ORTHOPAEDIC SURGERY

## 2021-03-02 PROCEDURE — 3074F PR MOST RECENT SYSTOLIC BLOOD PRESSURE < 130 MM HG: ICD-10-PCS | Mod: S$GLB,,, | Performed by: ORTHOPAEDIC SURGERY

## 2021-03-02 RX ORDER — SIMVASTATIN 40 MG/1
40 TABLET, FILM COATED ORAL DAILY
COMMUNITY
Start: 2021-02-20 | End: 2023-12-01

## 2021-03-02 RX ORDER — TRAMADOL HYDROCHLORIDE 50 MG/1
50 TABLET ORAL 3 TIMES DAILY PRN
COMMUNITY
Start: 2021-02-19

## 2021-03-02 RX ORDER — ERGOCALCIFEROL 1.25 MG/1
1250 CAPSULE ORAL DAILY
COMMUNITY

## 2021-03-02 RX ORDER — METHYLPREDNISOLONE ACETATE 40 MG/ML
40 INJECTION, SUSPENSION INTRA-ARTICULAR; INTRALESIONAL; INTRAMUSCULAR; SOFT TISSUE
Status: DISCONTINUED | OUTPATIENT
Start: 2021-03-02 | End: 2021-03-02 | Stop reason: HOSPADM

## 2021-03-02 RX ORDER — AMLODIPINE BESYLATE 10 MG/1
10 TABLET ORAL DAILY
Status: ON HOLD | COMMUNITY
Start: 2021-02-20 | End: 2023-12-03 | Stop reason: HOSPADM

## 2021-03-02 RX ORDER — HYDROCHLOROTHIAZIDE 12.5 MG/1
12.5 CAPSULE ORAL DAILY
Status: ON HOLD | COMMUNITY
Start: 2020-06-15 | End: 2022-02-18 | Stop reason: HOSPADM

## 2021-03-02 RX ORDER — OLMESARTAN MEDOXOMIL AND HYDROCHLOROTHIAZIDE 20/12.5 20; 12.5 MG/1; MG/1
1 TABLET ORAL DAILY
COMMUNITY
Start: 2021-02-20 | End: 2022-02-13

## 2021-03-02 RX ORDER — PANTOPRAZOLE SODIUM 40 MG/1
40 TABLET, DELAYED RELEASE ORAL
COMMUNITY
Start: 2019-06-13 | End: 2022-02-13

## 2021-03-02 RX ADMIN — METHYLPREDNISOLONE ACETATE 40 MG: 40 INJECTION, SUSPENSION INTRA-ARTICULAR; INTRALESIONAL; INTRAMUSCULAR; SOFT TISSUE at 10:03

## 2021-04-13 ENCOUNTER — OFFICE VISIT (OUTPATIENT)
Dept: ORTHOPEDICS | Facility: CLINIC | Age: 81
End: 2021-04-13
Payer: MEDICARE

## 2021-04-13 VITALS
HEART RATE: 96 BPM | HEIGHT: 62 IN | BODY MASS INDEX: 27.6 KG/M2 | DIASTOLIC BLOOD PRESSURE: 62 MMHG | SYSTOLIC BLOOD PRESSURE: 110 MMHG | WEIGHT: 150 LBS

## 2021-04-13 DIAGNOSIS — M19.011 ARTHROPATHY OF RIGHT SHOULDER: Primary | ICD-10-CM

## 2021-04-13 DIAGNOSIS — M17.11 PRIMARY OSTEOARTHRITIS OF RIGHT KNEE: ICD-10-CM

## 2021-04-13 DIAGNOSIS — M25.512 ACUTE PAIN OF LEFT SHOULDER: ICD-10-CM

## 2021-04-13 DIAGNOSIS — M19.012 ARTHROSIS OF LEFT ACROMIOCLAVICULAR JOINT: ICD-10-CM

## 2021-04-13 DIAGNOSIS — M19.012 ARTHROPATHY OF LEFT SHOULDER: ICD-10-CM

## 2021-04-13 PROCEDURE — 20610 LARGE JOINT ASPIRATION/INJECTION: R SUBACROMIAL BURSA: ICD-10-PCS | Mod: 50,S$GLB,, | Performed by: ORTHOPAEDIC SURGERY

## 2021-04-13 PROCEDURE — 1101F PR PT FALLS ASSESS DOC 0-1 FALLS W/OUT INJ PAST YR: ICD-10-PCS | Mod: S$GLB,,, | Performed by: ORTHOPAEDIC SURGERY

## 2021-04-13 PROCEDURE — 99214 PR OFFICE/OUTPT VISIT, EST, LEVL IV, 30-39 MIN: ICD-10-PCS | Mod: 25,S$GLB,, | Performed by: ORTHOPAEDIC SURGERY

## 2021-04-13 PROCEDURE — 3288F FALL RISK ASSESSMENT DOCD: CPT | Mod: S$GLB,,, | Performed by: ORTHOPAEDIC SURGERY

## 2021-04-13 PROCEDURE — 1125F AMNT PAIN NOTED PAIN PRSNT: CPT | Mod: S$GLB,,, | Performed by: ORTHOPAEDIC SURGERY

## 2021-04-13 PROCEDURE — 1159F MED LIST DOCD IN RCRD: CPT | Mod: S$GLB,,, | Performed by: ORTHOPAEDIC SURGERY

## 2021-04-13 PROCEDURE — 1101F PT FALLS ASSESS-DOCD LE1/YR: CPT | Mod: S$GLB,,, | Performed by: ORTHOPAEDIC SURGERY

## 2021-04-13 PROCEDURE — 1125F PR PAIN SEVERITY QUANTIFIED, PAIN PRESENT: ICD-10-PCS | Mod: S$GLB,,, | Performed by: ORTHOPAEDIC SURGERY

## 2021-04-13 PROCEDURE — 3288F PR FALLS RISK ASSESSMENT DOCUMENTED: ICD-10-PCS | Mod: S$GLB,,, | Performed by: ORTHOPAEDIC SURGERY

## 2021-04-13 PROCEDURE — 20610 DRAIN/INJ JOINT/BURSA W/O US: CPT | Mod: 50,S$GLB,, | Performed by: ORTHOPAEDIC SURGERY

## 2021-04-13 PROCEDURE — 1159F PR MEDICATION LIST DOCUMENTED IN MEDICAL RECORD: ICD-10-PCS | Mod: S$GLB,,, | Performed by: ORTHOPAEDIC SURGERY

## 2021-04-13 PROCEDURE — 99214 OFFICE O/P EST MOD 30 MIN: CPT | Mod: 25,S$GLB,, | Performed by: ORTHOPAEDIC SURGERY

## 2021-04-13 RX ORDER — METHYLPREDNISOLONE ACETATE 40 MG/ML
40 INJECTION, SUSPENSION INTRA-ARTICULAR; INTRALESIONAL; INTRAMUSCULAR; SOFT TISSUE
Status: DISCONTINUED | OUTPATIENT
Start: 2021-04-13 | End: 2021-04-13 | Stop reason: HOSPADM

## 2021-04-13 RX ADMIN — METHYLPREDNISOLONE ACETATE 40 MG: 40 INJECTION, SUSPENSION INTRA-ARTICULAR; INTRALESIONAL; INTRAMUSCULAR; SOFT TISSUE at 11:04

## 2021-09-08 ENCOUNTER — HOSPITAL ENCOUNTER (EMERGENCY)
Facility: HOSPITAL | Age: 81
Discharge: HOME | End: 2021-09-08
Attending: EMERGENCY MEDICINE
Payer: MEDICARE

## 2021-09-08 VITALS
HEART RATE: 98 BPM | BODY MASS INDEX: 27.6 KG/M2 | TEMPERATURE: 97 F | HEIGHT: 62 IN | SYSTOLIC BLOOD PRESSURE: 141 MMHG | WEIGHT: 150 LBS | RESPIRATION RATE: 16 BRPM | DIASTOLIC BLOOD PRESSURE: 81 MMHG | OXYGEN SATURATION: 96 %

## 2021-09-08 DIAGNOSIS — E86.0 DEHYDRATION: ICD-10-CM

## 2021-09-08 DIAGNOSIS — M54.9 BACK PAIN: ICD-10-CM

## 2021-09-08 DIAGNOSIS — E87.6 HYPOKALEMIA: ICD-10-CM

## 2021-09-08 DIAGNOSIS — M25.512 ACUTE PAIN OF LEFT SHOULDER: Primary | ICD-10-CM

## 2021-09-08 LAB
ALBUMIN SERPL BCP-MCNC: 3.2 G/DL (ref 3.5–5.2)
ALP SERPL-CCNC: 144 U/L (ref 55–135)
ALT SERPL W/O P-5'-P-CCNC: 8 U/L (ref 10–44)
ANION GAP SERPL CALC-SCNC: 15 MMOL/L (ref 8–16)
AST SERPL-CCNC: 15 U/L (ref 10–40)
BASOPHILS # BLD AUTO: 0.08 K/UL (ref 0–0.2)
BASOPHILS NFR BLD: 0.8 % (ref 0–1.9)
BILIRUB SERPL-MCNC: 0.4 MG/DL (ref 0.1–1)
BUN SERPL-MCNC: 17 MG/DL (ref 8–23)
CALCIUM SERPL-MCNC: 10.5 MG/DL (ref 8.7–10.5)
CHLORIDE SERPL-SCNC: 102 MMOL/L (ref 95–110)
CO2 SERPL-SCNC: 23 MMOL/L (ref 23–29)
CREAT SERPL-MCNC: 2 MG/DL (ref 0.5–1.4)
DIFFERENTIAL METHOD: ABNORMAL
EOSINOPHIL # BLD AUTO: 0.1 K/UL (ref 0–0.5)
EOSINOPHIL NFR BLD: 1.2 % (ref 0–8)
ERYTHROCYTE [DISTWIDTH] IN BLOOD BY AUTOMATED COUNT: 15.7 % (ref 11.5–14.5)
EST. GFR  (AFRICAN AMERICAN): 26 ML/MIN/1.73 M^2
EST. GFR  (NON AFRICAN AMERICAN): 23 ML/MIN/1.73 M^2
GLUCOSE SERPL-MCNC: 136 MG/DL (ref 70–110)
HCT VFR BLD AUTO: 44.3 % (ref 37–48.5)
HGB BLD-MCNC: 13 G/DL (ref 12–16)
IMM GRANULOCYTES # BLD AUTO: 0.07 K/UL (ref 0–0.04)
IMM GRANULOCYTES NFR BLD AUTO: 0.7 % (ref 0–0.5)
LYMPHOCYTES # BLD AUTO: 1.7 K/UL (ref 1–4.8)
LYMPHOCYTES NFR BLD: 17.8 % (ref 18–48)
MAGNESIUM SERPL-MCNC: 2.2 MG/DL (ref 1.6–2.6)
MCH RBC QN AUTO: 24.7 PG (ref 27–31)
MCHC RBC AUTO-ENTMCNC: 29.3 G/DL (ref 32–36)
MCV RBC AUTO: 84 FL (ref 82–98)
MONOCYTES # BLD AUTO: 0.9 K/UL (ref 0.3–1)
MONOCYTES NFR BLD: 9.7 % (ref 4–15)
NEUTROPHILS # BLD AUTO: 6.6 K/UL (ref 1.8–7.7)
NEUTROPHILS NFR BLD: 69.8 % (ref 38–73)
NRBC BLD-RTO: 0 /100 WBC
PLATELET # BLD AUTO: 290 K/UL (ref 150–450)
PMV BLD AUTO: 9.8 FL (ref 9.2–12.9)
POTASSIUM SERPL-SCNC: 2.9 MMOL/L (ref 3.5–5.1)
PROT SERPL-MCNC: 7.4 G/DL (ref 6–8.4)
RBC # BLD AUTO: 5.27 M/UL (ref 4–5.4)
SODIUM SERPL-SCNC: 140 MMOL/L (ref 136–145)
TROPONIN I SERPL DL<=0.01 NG/ML-MCNC: <0.006 NG/ML (ref 0–0.03)
WBC # BLD AUTO: 9.44 K/UL (ref 3.9–12.7)

## 2021-09-08 PROCEDURE — 36415 COLL VENOUS BLD VENIPUNCTURE: CPT | Performed by: PHYSICIAN ASSISTANT

## 2021-09-08 PROCEDURE — 96365 THER/PROPH/DIAG IV INF INIT: CPT

## 2021-09-08 PROCEDURE — 25000003 PHARM REV CODE 250: Performed by: PHYSICIAN ASSISTANT

## 2021-09-08 PROCEDURE — 83735 ASSAY OF MAGNESIUM: CPT | Performed by: EMERGENCY MEDICINE

## 2021-09-08 PROCEDURE — 36415 COLL VENOUS BLD VENIPUNCTURE: CPT | Performed by: EMERGENCY MEDICINE

## 2021-09-08 PROCEDURE — 25000003 PHARM REV CODE 250: Performed by: EMERGENCY MEDICINE

## 2021-09-08 PROCEDURE — 85025 COMPLETE CBC W/AUTO DIFF WBC: CPT | Performed by: PHYSICIAN ASSISTANT

## 2021-09-08 PROCEDURE — 80053 COMPREHEN METABOLIC PANEL: CPT | Performed by: PHYSICIAN ASSISTANT

## 2021-09-08 PROCEDURE — 84484 ASSAY OF TROPONIN QUANT: CPT | Performed by: PHYSICIAN ASSISTANT

## 2021-09-08 PROCEDURE — 93005 ELECTROCARDIOGRAM TRACING: CPT

## 2021-09-08 PROCEDURE — 99285 EMERGENCY DEPT VISIT HI MDM: CPT | Mod: 25

## 2021-09-08 PROCEDURE — 63600175 PHARM REV CODE 636 W HCPCS: Performed by: EMERGENCY MEDICINE

## 2021-09-08 RX ORDER — HYDROCODONE BITARTRATE AND ACETAMINOPHEN 5; 325 MG/1; MG/1
1 TABLET ORAL
Status: COMPLETED | OUTPATIENT
Start: 2021-09-08 | End: 2021-09-08

## 2021-09-08 RX ORDER — POTASSIUM CHLORIDE 7.45 MG/ML
10 INJECTION INTRAVENOUS
Status: COMPLETED | OUTPATIENT
Start: 2021-09-08 | End: 2021-09-08

## 2021-09-08 RX ORDER — POTASSIUM CHLORIDE 20 MEQ/1
40 TABLET, EXTENDED RELEASE ORAL
Status: COMPLETED | OUTPATIENT
Start: 2021-09-08 | End: 2021-09-08

## 2021-09-08 RX ADMIN — POTASSIUM CHLORIDE 10 MEQ: 7.46 INJECTION, SOLUTION INTRAVENOUS at 01:09

## 2021-09-08 RX ADMIN — HYDROCODONE BITARTRATE AND ACETAMINOPHEN 1 TABLET: 5; 325 TABLET ORAL at 01:09

## 2021-09-08 RX ADMIN — SODIUM CHLORIDE, SODIUM LACTATE, POTASSIUM CHLORIDE, AND CALCIUM CHLORIDE 1000 ML: .6; .31; .03; .02 INJECTION, SOLUTION INTRAVENOUS at 01:09

## 2021-09-08 RX ADMIN — POTASSIUM CHLORIDE 40 MEQ: 1500 TABLET, EXTENDED RELEASE ORAL at 01:09

## 2022-02-13 ENCOUNTER — HOSPITAL ENCOUNTER (INPATIENT)
Facility: HOSPITAL | Age: 82
LOS: 5 days | Discharge: HOME HEALTH | DRG: 871 | End: 2022-02-18
Attending: EMERGENCY MEDICINE | Admitting: INTERNAL MEDICINE
Payer: MEDICARE

## 2022-02-13 DIAGNOSIS — E87.20 LACTIC ACID INCREASED: ICD-10-CM

## 2022-02-13 DIAGNOSIS — R00.0 TACHYCARDIA: ICD-10-CM

## 2022-02-13 DIAGNOSIS — D72.829 LEUKOCYTOSIS, UNSPECIFIED TYPE: ICD-10-CM

## 2022-02-13 DIAGNOSIS — I82.413 ACUTE BILATERAL DEEP VEIN THROMBOSIS (DVT) OF FEMORAL VEINS: Chronic | ICD-10-CM

## 2022-02-13 DIAGNOSIS — A49.8 INFECTION DUE TO ACINETOBACTER BAUMANNII: ICD-10-CM

## 2022-02-13 DIAGNOSIS — R55 SYNCOPE: ICD-10-CM

## 2022-02-13 DIAGNOSIS — R09.02 HYPOXIA: ICD-10-CM

## 2022-02-13 DIAGNOSIS — R78.81 POSITIVE BLOOD CULTURE: ICD-10-CM

## 2022-02-13 DIAGNOSIS — I71.20 THORACIC AORTIC ANEURYSM WITHOUT RUPTURE: ICD-10-CM

## 2022-02-13 DIAGNOSIS — R55 SYNCOPE, UNSPECIFIED SYNCOPE TYPE: ICD-10-CM

## 2022-02-13 DIAGNOSIS — I26.99 BILATERAL PULMONARY EMBOLISM: Primary | ICD-10-CM

## 2022-02-13 DIAGNOSIS — I82.409 DVT (DEEP VENOUS THROMBOSIS): ICD-10-CM

## 2022-02-13 LAB
ALBUMIN SERPL BCP-MCNC: 3.6 G/DL (ref 3.5–5.2)
ALP SERPL-CCNC: 129 U/L (ref 55–135)
ALT SERPL W/O P-5'-P-CCNC: 10 U/L (ref 10–44)
ANION GAP SERPL CALC-SCNC: 16 MMOL/L (ref 8–16)
APTT PPP: 23 SEC (ref 23.3–35.1)
AST SERPL-CCNC: 16 U/L (ref 10–40)
BASOPHILS # BLD AUTO: 0.06 K/UL (ref 0–0.2)
BASOPHILS NFR BLD: 0.4 % (ref 0–1.9)
BILIRUB SERPL-MCNC: 1.3 MG/DL (ref 0.1–1)
BNP SERPL-MCNC: 38 PG/ML (ref 0–99)
BUN SERPL-MCNC: 30 MG/DL (ref 8–23)
CALCIUM SERPL-MCNC: 9.9 MG/DL (ref 8.7–10.5)
CHLORIDE SERPL-SCNC: 102 MMOL/L (ref 95–110)
CO2 SERPL-SCNC: 23 MMOL/L (ref 23–29)
CREAT SERPL-MCNC: 2 MG/DL (ref 0.5–1.4)
CREAT SERPL-MCNC: 2 MG/DL (ref 0.5–1.4)
DIFFERENTIAL METHOD: ABNORMAL
EOSINOPHIL # BLD AUTO: 0.1 K/UL (ref 0–0.5)
EOSINOPHIL NFR BLD: 0.5 % (ref 0–8)
ERYTHROCYTE [DISTWIDTH] IN BLOOD BY AUTOMATED COUNT: 15.1 % (ref 11.5–14.5)
EST. GFR  (AFRICAN AMERICAN): 26 ML/MIN/1.73 M^2
EST. GFR  (NON AFRICAN AMERICAN): 22.6 ML/MIN/1.73 M^2
GLUCOSE SERPL-MCNC: 150 MG/DL (ref 70–110)
GLUCOSE SERPL-MCNC: 183 MG/DL (ref 70–110)
GLUCOSE SERPL-MCNC: 203 MG/DL (ref 70–110)
HCT VFR BLD AUTO: 45.7 % (ref 37–48.5)
HGB BLD-MCNC: 14.4 G/DL (ref 12–16)
IMM GRANULOCYTES # BLD AUTO: 0.12 K/UL (ref 0–0.04)
IMM GRANULOCYTES NFR BLD AUTO: 0.8 % (ref 0–0.5)
INR PPP: 1.2
LACTATE SERPL-SCNC: 4.3 MMOL/L (ref 0.5–1.9)
LIPASE SERPL-CCNC: 49 U/L (ref 4–60)
LYMPHOCYTES # BLD AUTO: 2.2 K/UL (ref 1–4.8)
LYMPHOCYTES NFR BLD: 14.1 % (ref 18–48)
MCH RBC QN AUTO: 25.7 PG (ref 27–31)
MCHC RBC AUTO-ENTMCNC: 31.5 G/DL (ref 32–36)
MCV RBC AUTO: 82 FL (ref 82–98)
MONOCYTES # BLD AUTO: 0.9 K/UL (ref 0.3–1)
MONOCYTES NFR BLD: 6 % (ref 4–15)
NEUTROPHILS # BLD AUTO: 12.4 K/UL (ref 1.8–7.7)
NEUTROPHILS NFR BLD: 78.2 % (ref 38–73)
NRBC BLD-RTO: 0 /100 WBC
PLATELET # BLD AUTO: 184 K/UL (ref 150–450)
PMV BLD AUTO: 11.1 FL (ref 9.2–12.9)
POTASSIUM SERPL-SCNC: 3.6 MMOL/L (ref 3.5–5.1)
PROT SERPL-MCNC: 7.6 G/DL (ref 6–8.4)
PROTHROMBIN TIME: 14.1 SEC (ref 11.4–13.7)
RBC # BLD AUTO: 5.61 M/UL (ref 4–5.4)
SAMPLE: ABNORMAL
SARS-COV-2 RDRP RESP QL NAA+PROBE: NEGATIVE
SODIUM SERPL-SCNC: 141 MMOL/L (ref 136–145)
TROPONIN I SERPL DL<=0.01 NG/ML-MCNC: 0.03 NG/ML
WBC # BLD AUTO: 15.78 K/UL (ref 3.9–12.7)

## 2022-02-13 PROCEDURE — 93010 ELECTROCARDIOGRAM REPORT: CPT | Mod: ,,, | Performed by: SPECIALIST

## 2022-02-13 PROCEDURE — 82962 GLUCOSE BLOOD TEST: CPT

## 2022-02-13 PROCEDURE — 96367 TX/PROPH/DG ADDL SEQ IV INF: CPT

## 2022-02-13 PROCEDURE — 96361 HYDRATE IV INFUSION ADD-ON: CPT

## 2022-02-13 PROCEDURE — 85025 COMPLETE CBC W/AUTO DIFF WBC: CPT | Performed by: NURSE PRACTITIONER

## 2022-02-13 PROCEDURE — 87040 BLOOD CULTURE FOR BACTERIA: CPT | Performed by: NURSE PRACTITIONER

## 2022-02-13 PROCEDURE — 93010 EKG 12-LEAD: ICD-10-PCS | Mod: ,,, | Performed by: SPECIALIST

## 2022-02-13 PROCEDURE — 84484 ASSAY OF TROPONIN QUANT: CPT | Performed by: NURSE PRACTITIONER

## 2022-02-13 PROCEDURE — 83880 ASSAY OF NATRIURETIC PEPTIDE: CPT | Performed by: NURSE PRACTITIONER

## 2022-02-13 PROCEDURE — 99291 CRITICAL CARE FIRST HOUR: CPT

## 2022-02-13 PROCEDURE — U0002 COVID-19 LAB TEST NON-CDC: HCPCS | Performed by: NURSE PRACTITIONER

## 2022-02-13 PROCEDURE — 85610 PROTHROMBIN TIME: CPT | Performed by: NURSE PRACTITIONER

## 2022-02-13 PROCEDURE — 83690 ASSAY OF LIPASE: CPT | Performed by: NURSE PRACTITIONER

## 2022-02-13 PROCEDURE — 87186 SC STD MICRODIL/AGAR DIL: CPT | Performed by: NURSE PRACTITIONER

## 2022-02-13 PROCEDURE — 25000003 PHARM REV CODE 250: Performed by: NURSE PRACTITIONER

## 2022-02-13 PROCEDURE — 96372 THER/PROPH/DIAG INJ SC/IM: CPT

## 2022-02-13 PROCEDURE — 85730 THROMBOPLASTIN TIME PARTIAL: CPT | Performed by: NURSE PRACTITIONER

## 2022-02-13 PROCEDURE — 63600175 PHARM REV CODE 636 W HCPCS: Performed by: EMERGENCY MEDICINE

## 2022-02-13 PROCEDURE — 87077 CULTURE AEROBIC IDENTIFY: CPT | Performed by: NURSE PRACTITIONER

## 2022-02-13 PROCEDURE — 12000002 HC ACUTE/MED SURGE SEMI-PRIVATE ROOM

## 2022-02-13 PROCEDURE — 80053 COMPREHEN METABOLIC PANEL: CPT | Performed by: NURSE PRACTITIONER

## 2022-02-13 PROCEDURE — 83605 ASSAY OF LACTIC ACID: CPT | Performed by: NURSE PRACTITIONER

## 2022-02-13 PROCEDURE — 25000003 PHARM REV CODE 250: Performed by: EMERGENCY MEDICINE

## 2022-02-13 PROCEDURE — 36415 COLL VENOUS BLD VENIPUNCTURE: CPT | Performed by: EMERGENCY MEDICINE

## 2022-02-13 PROCEDURE — 96365 THER/PROPH/DIAG IV INF INIT: CPT

## 2022-02-13 PROCEDURE — 93005 ELECTROCARDIOGRAM TRACING: CPT | Performed by: SPECIALIST

## 2022-02-13 RX ORDER — SODIUM CHLORIDE 0.9 % (FLUSH) 0.9 %
10 SYRINGE (ML) INJECTION
Status: DISCONTINUED | OUTPATIENT
Start: 2022-02-13 | End: 2022-02-18 | Stop reason: HOSPADM

## 2022-02-13 RX ORDER — IBUPROFEN 200 MG
24 TABLET ORAL
Status: DISCONTINUED | OUTPATIENT
Start: 2022-02-13 | End: 2022-02-18 | Stop reason: HOSPADM

## 2022-02-13 RX ORDER — SODIUM,POTASSIUM PHOSPHATES 280-250MG
2 POWDER IN PACKET (EA) ORAL
Status: DISCONTINUED | OUTPATIENT
Start: 2022-02-13 | End: 2022-02-18 | Stop reason: HOSPADM

## 2022-02-13 RX ORDER — LACTULOSE 10 G/15ML
200 SOLUTION ORAL; RECTAL 2 TIMES DAILY
Status: DISCONTINUED | OUTPATIENT
Start: 2022-02-13 | End: 2022-02-14

## 2022-02-13 RX ORDER — VANCOMYCIN HCL IN 5 % DEXTROSE 1G/250ML
1000 PLASTIC BAG, INJECTION (ML) INTRAVENOUS ONCE
Status: COMPLETED | OUTPATIENT
Start: 2022-02-13 | End: 2022-02-13

## 2022-02-13 RX ORDER — NALOXONE HCL 0.4 MG/ML
0.02 VIAL (ML) INJECTION
Status: DISCONTINUED | OUTPATIENT
Start: 2022-02-13 | End: 2022-02-18 | Stop reason: HOSPADM

## 2022-02-13 RX ORDER — ENOXAPARIN SODIUM 100 MG/ML
1 INJECTION SUBCUTANEOUS
Status: DISCONTINUED | OUTPATIENT
Start: 2022-02-14 | End: 2022-02-16

## 2022-02-13 RX ORDER — IBUPROFEN 200 MG
16 TABLET ORAL
Status: DISCONTINUED | OUTPATIENT
Start: 2022-02-13 | End: 2022-02-18 | Stop reason: HOSPADM

## 2022-02-13 RX ORDER — ONDANSETRON 2 MG/ML
4 INJECTION INTRAMUSCULAR; INTRAVENOUS EVERY 8 HOURS PRN
Status: DISCONTINUED | OUTPATIENT
Start: 2022-02-13 | End: 2022-02-18 | Stop reason: HOSPADM

## 2022-02-13 RX ORDER — INSULIN ASPART 100 [IU]/ML
0-5 INJECTION, SOLUTION INTRAVENOUS; SUBCUTANEOUS
Status: DISCONTINUED | OUTPATIENT
Start: 2022-02-13 | End: 2022-02-18 | Stop reason: HOSPADM

## 2022-02-13 RX ORDER — LANOLIN ALCOHOL/MO/W.PET/CERES
800 CREAM (GRAM) TOPICAL
Status: DISCONTINUED | OUTPATIENT
Start: 2022-02-13 | End: 2022-02-18 | Stop reason: HOSPADM

## 2022-02-13 RX ORDER — GLUCAGON 1 MG
1 KIT INJECTION
Status: DISCONTINUED | OUTPATIENT
Start: 2022-02-13 | End: 2022-02-18 | Stop reason: HOSPADM

## 2022-02-13 RX ORDER — ENOXAPARIN SODIUM 100 MG/ML
1 INJECTION SUBCUTANEOUS
Status: DISCONTINUED | OUTPATIENT
Start: 2022-02-13 | End: 2022-02-13

## 2022-02-13 RX ORDER — BISACODYL 10 MG
10 SUPPOSITORY, RECTAL RECTAL DAILY PRN
Status: DISCONTINUED | OUTPATIENT
Start: 2022-02-13 | End: 2022-02-18 | Stop reason: HOSPADM

## 2022-02-13 RX ORDER — HYDRALAZINE HYDROCHLORIDE 20 MG/ML
10 INJECTION INTRAMUSCULAR; INTRAVENOUS EVERY 6 HOURS PRN
Status: DISCONTINUED | OUTPATIENT
Start: 2022-02-13 | End: 2022-02-18 | Stop reason: HOSPADM

## 2022-02-13 RX ORDER — ACETAMINOPHEN 325 MG/1
650 TABLET ORAL EVERY 8 HOURS PRN
Status: DISCONTINUED | OUTPATIENT
Start: 2022-02-13 | End: 2022-02-18 | Stop reason: HOSPADM

## 2022-02-13 RX ORDER — ACETAMINOPHEN 325 MG/1
650 TABLET ORAL EVERY 4 HOURS PRN
Status: DISCONTINUED | OUTPATIENT
Start: 2022-02-13 | End: 2022-02-18 | Stop reason: HOSPADM

## 2022-02-13 RX ORDER — ENOXAPARIN SODIUM 100 MG/ML
1 INJECTION SUBCUTANEOUS
Status: COMPLETED | OUTPATIENT
Start: 2022-02-13 | End: 2022-02-13

## 2022-02-13 RX ADMIN — VANCOMYCIN HYDROCHLORIDE 1000 MG: 1 INJECTION, POWDER, LYOPHILIZED, FOR SOLUTION INTRAVENOUS at 07:02

## 2022-02-13 RX ADMIN — SODIUM CHLORIDE 500 ML: 0.9 INJECTION, SOLUTION INTRAVENOUS at 04:02

## 2022-02-13 RX ADMIN — PIPERACILLIN AND TAZOBACTAM 4.5 G: 4; .5 INJECTION, POWDER, LYOPHILIZED, FOR SOLUTION INTRAVENOUS; PARENTERAL at 05:02

## 2022-02-13 RX ADMIN — SODIUM CHLORIDE 1550 ML: 9 INJECTION, SOLUTION INTRAVENOUS at 06:02

## 2022-02-13 RX ADMIN — VANCOMYCIN HYDROCHLORIDE 500 MG: 500 INJECTION, POWDER, LYOPHILIZED, FOR SOLUTION INTRAVENOUS at 07:02

## 2022-02-13 RX ADMIN — ENOXAPARIN SODIUM 70 MG: 80 INJECTION, SOLUTION INTRAVENOUS; SUBCUTANEOUS at 07:02

## 2022-02-14 ENCOUNTER — CLINICAL SUPPORT (OUTPATIENT)
Dept: CARDIOLOGY | Facility: HOSPITAL | Age: 82
DRG: 871 | End: 2022-02-14
Attending: EMERGENCY MEDICINE
Payer: MEDICARE

## 2022-02-14 VITALS — WEIGHT: 136 LBS | HEIGHT: 62 IN | BODY MASS INDEX: 25.03 KG/M2

## 2022-02-14 PROBLEM — N18.30 CKD (CHRONIC KIDNEY DISEASE), STAGE III: Chronic | Status: ACTIVE | Noted: 2022-02-14

## 2022-02-14 PROBLEM — E11.9 TYPE 2 DIABETES MELLITUS, WITHOUT LONG-TERM CURRENT USE OF INSULIN: Chronic | Status: ACTIVE | Noted: 2022-02-14

## 2022-02-14 PROBLEM — K59.00 CONSTIPATION: Status: ACTIVE | Noted: 2022-02-14

## 2022-02-14 PROBLEM — D72.829 LEUCOCYTOSIS: Status: ACTIVE | Noted: 2022-02-14

## 2022-02-14 PROBLEM — D69.6 THROMBOCYTOPENIA: Status: ACTIVE | Noted: 2022-02-14

## 2022-02-14 PROBLEM — R55 SYNCOPE AND COLLAPSE: Status: ACTIVE | Noted: 2022-02-14

## 2022-02-14 PROBLEM — E87.20 LACTIC ACID INCREASED: Status: ACTIVE | Noted: 2022-02-14

## 2022-02-14 PROBLEM — R78.81 POSITIVE BLOOD CULTURE: Status: ACTIVE | Noted: 2022-02-14

## 2022-02-14 PROBLEM — Z91.148 NON COMPLIANCE W MEDICATION REGIMEN: Chronic | Status: ACTIVE | Noted: 2022-02-14

## 2022-02-14 PROBLEM — I10 PRIMARY HYPERTENSION: Chronic | Status: ACTIVE | Noted: 2022-02-14

## 2022-02-14 PROBLEM — D72.829 LEUCOCYTOSIS: Status: RESOLVED | Noted: 2022-02-14 | Resolved: 2022-02-14

## 2022-02-14 PROBLEM — I71.40 ABDOMINAL AORTIC ANEURYSM (AAA) WITHOUT RUPTURE: Chronic | Status: ACTIVE | Noted: 2022-02-14

## 2022-02-14 PROBLEM — E87.20 LACTIC ACID INCREASED: Status: RESOLVED | Noted: 2022-02-14 | Resolved: 2022-02-14

## 2022-02-14 PROBLEM — I82.413 ACUTE BILATERAL DEEP VEIN THROMBOSIS (DVT) OF FEMORAL VEINS: Chronic | Status: ACTIVE | Noted: 2022-02-14

## 2022-02-14 LAB
ABO + RH BLD: NORMAL
ALBUMIN SERPL BCP-MCNC: 3.2 G/DL (ref 3.5–5.2)
ALP SERPL-CCNC: 105 U/L (ref 55–135)
ALT SERPL W/O P-5'-P-CCNC: 6 U/L (ref 10–44)
AMPHET+METHAMPHET UR QL: NEGATIVE
ANION GAP SERPL CALC-SCNC: 15 MMOL/L (ref 8–16)
AORTIC ROOT ANNULUS: 2.71 CM
AORTIC VALVE CUSP SEPERATION: 1.77 CM
AST SERPL-CCNC: 12 U/L (ref 10–40)
AV INDEX (PROSTH): 1.07
AV MEAN GRADIENT: 4 MMHG
AV PEAK GRADIENT: 6 MMHG
AV VALVE AREA: 3.5 CM2
AV VELOCITY RATIO: 88.99
BACTERIA #/AREA URNS HPF: ABNORMAL /HPF
BARBITURATES UR QL SCN>200 NG/ML: NEGATIVE
BASOPHILS # BLD AUTO: 0.07 K/UL (ref 0–0.2)
BASOPHILS NFR BLD: 0.6 % (ref 0–1.9)
BENZODIAZ UR QL SCN>200 NG/ML: NEGATIVE
BILIRUB SERPL-MCNC: 1 MG/DL (ref 0.1–1)
BILIRUB UR QL STRIP: NEGATIVE
BLD GP AB SCN CELLS X3 SERPL QL: NORMAL
BSA FOR ECHO PROCEDURE: 1.64 M2
BUN SERPL-MCNC: 32 MG/DL (ref 8–23)
BZE UR QL SCN: NEGATIVE
CALCIUM SERPL-MCNC: 9.2 MG/DL (ref 8.7–10.5)
CANNABINOIDS UR QL SCN: NEGATIVE
CHLORIDE SERPL-SCNC: 104 MMOL/L (ref 95–110)
CLARITY UR: ABNORMAL
CO2 SERPL-SCNC: 21 MMOL/L (ref 23–29)
COLOR UR: ABNORMAL
CREAT SERPL-MCNC: 1.7 MG/DL (ref 0.5–1.4)
CREAT UR-MCNC: 219 MG/DL (ref 15–325)
CV ECHO LV RWT: 0.73 CM
DIFFERENTIAL METHOD: ABNORMAL
DOP CALC AO PEAK VEL: 1.24 M/S
DOP CALC AO VTI: 19.32 CM
DOP CALC LVOT AREA: 3.3 CM2
DOP CALC LVOT DIAMETER: 2.04 CM
DOP CALC LVOT PEAK VEL: 110.35 M/S
DOP CALC LVOT STROKE VOLUME: 67.69 CM3
DOP CALCLVOT PEAK VEL VTI: 20.72 CM
E WAVE DECELERATION TIME: 204.64 MSEC
E/A RATIO: 0.42
E/E' RATIO: 8 M/S
ECHO LV POSTERIOR WALL: 1.1 CM (ref 0.6–1.1)
EJECTION FRACTION: 60 %
EOSINOPHIL # BLD AUTO: 0.1 K/UL (ref 0–0.5)
EOSINOPHIL NFR BLD: 1 % (ref 0–8)
ERYTHROCYTE [DISTWIDTH] IN BLOOD BY AUTOMATED COUNT: 14.9 % (ref 11.5–14.5)
EST. GFR  (AFRICAN AMERICAN): 31.7 ML/MIN/1.73 M^2
EST. GFR  (NON AFRICAN AMERICAN): 27.5 ML/MIN/1.73 M^2
FRACTIONAL SHORTENING: 31 % (ref 28–44)
GLUCOSE SERPL-MCNC: 100 MG/DL (ref 70–110)
GLUCOSE SERPL-MCNC: 106 MG/DL (ref 70–110)
GLUCOSE SERPL-MCNC: 140 MG/DL (ref 70–110)
GLUCOSE SERPL-MCNC: 95 MG/DL (ref 70–110)
GLUCOSE SERPL-MCNC: 98 MG/DL (ref 70–110)
GLUCOSE UR QL STRIP: NEGATIVE
HCT VFR BLD AUTO: 44.3 % (ref 37–48.5)
HGB BLD-MCNC: 13.4 G/DL (ref 12–16)
HGB UR QL STRIP: ABNORMAL
HYALINE CASTS #/AREA URNS LPF: 23 /LPF
IMM GRANULOCYTES # BLD AUTO: 0.05 K/UL (ref 0–0.04)
IMM GRANULOCYTES NFR BLD AUTO: 0.5 % (ref 0–0.5)
INTERVENTRICULAR SEPTUM: 1.22 CM (ref 0.6–1.1)
IVRT: 111.88 MSEC
KETONES UR QL STRIP: ABNORMAL
LACTATE SERPL-SCNC: 1.9 MMOL/L (ref 0.5–1.9)
LACTATE SERPL-SCNC: 1.9 MMOL/L (ref 0.5–1.9)
LEFT INTERNAL DIMENSION IN SYSTOLE: 2.09 CM (ref 2.1–4)
LEFT VENTRICLE MASS INDEX: 64 G/M2
LEFT VENTRICULAR INTERNAL DIMENSION IN DIASTOLE: 3.01 CM (ref 3.5–6)
LEFT VENTRICULAR MASS: 103.89 G
LEUKOCYTE ESTERASE UR QL STRIP: ABNORMAL
LV LATERAL E/E' RATIO: 6.5 M/S
LV SEPTAL E/E' RATIO: 10.4 M/S
LYMPHOCYTES # BLD AUTO: 2 K/UL (ref 1–4.8)
LYMPHOCYTES NFR BLD: 17.8 % (ref 18–48)
MAGNESIUM SERPL-MCNC: 2.1 MG/DL (ref 1.6–2.6)
MCH RBC QN AUTO: 25.5 PG (ref 27–31)
MCHC RBC AUTO-ENTMCNC: 30.2 G/DL (ref 32–36)
MCV RBC AUTO: 84 FL (ref 82–98)
MICROSCOPIC COMMENT: ABNORMAL
MONOCYTES # BLD AUTO: 1 K/UL (ref 0.3–1)
MONOCYTES NFR BLD: 9.1 % (ref 4–15)
MV PEAK A VEL: 1.25 M/S
MV PEAK E VEL: 0.52 M/S
NEUTROPHILS # BLD AUTO: 7.8 K/UL (ref 1.8–7.7)
NEUTROPHILS NFR BLD: 71 % (ref 38–73)
NITRITE UR QL STRIP: ABNORMAL
NRBC BLD-RTO: 0 /100 WBC
OPIATES UR QL SCN: NEGATIVE
PCP UR QL SCN>25 NG/ML: NEGATIVE
PH UR STRIP: 6 [PH] (ref 5–8)
PHOSPHATE SERPL-MCNC: 2.9 MG/DL (ref 2.7–4.5)
PISA TR MAX VEL: 2.6 M/S
PLATELET # BLD AUTO: 125 K/UL (ref 150–450)
PMV BLD AUTO: 9.7 FL (ref 9.2–12.9)
POTASSIUM SERPL-SCNC: 3.8 MMOL/L (ref 3.5–5.1)
PROT SERPL-MCNC: 7.1 G/DL (ref 6–8.4)
PROT UR QL STRIP: ABNORMAL
PULM VEIN S/D RATIO: 1.6
PV PEAK D VEL: 31.78 M/S
PV PEAK S VEL: 50.84 M/S
PV PEAK VELOCITY: 75.78 CM/S
RA PRESSURE: 3 MMHG
RBC # BLD AUTO: 5.26 M/UL (ref 4–5.4)
RBC #/AREA URNS HPF: >100 /HPF (ref 0–4)
RIGHT VENTRICULAR END-DIASTOLIC DIMENSION: 271 CM
SODIUM SERPL-SCNC: 140 MMOL/L (ref 136–145)
SP GR UR STRIP: 1.02 (ref 1–1.03)
SQUAMOUS #/AREA URNS HPF: 15 /HPF
TDI LATERAL: 0.08 M/S
TDI SEPTAL: 0.05 M/S
TDI: 0.07 M/S
TOXICOLOGY INFORMATION: NORMAL
TR MAX PG: 27 MMHG
TV REST PULMONARY ARTERY PRESSURE: 30 MMHG
URN SPEC COLLECT METH UR: ABNORMAL
UROBILINOGEN UR STRIP-ACNC: ABNORMAL EU/DL
VANCOMYCIN SERPL-MCNC: 12.4 UG/ML
WBC # BLD AUTO: 11.01 K/UL (ref 3.9–12.7)
WBC #/AREA URNS HPF: 30 /HPF (ref 0–5)

## 2022-02-14 PROCEDURE — 87086 URINE CULTURE/COLONY COUNT: CPT | Performed by: NURSE PRACTITIONER

## 2022-02-14 PROCEDURE — 80053 COMPREHEN METABOLIC PANEL: CPT | Performed by: NURSE PRACTITIONER

## 2022-02-14 PROCEDURE — 63600175 PHARM REV CODE 636 W HCPCS: Performed by: EMERGENCY MEDICINE

## 2022-02-14 PROCEDURE — 93306 ECHO (CUPID ONLY): ICD-10-PCS | Mod: 26,,, | Performed by: INTERNAL MEDICINE

## 2022-02-14 PROCEDURE — 99222 1ST HOSP IP/OBS MODERATE 55: CPT | Mod: ,,, | Performed by: INTERNAL MEDICINE

## 2022-02-14 PROCEDURE — 84100 ASSAY OF PHOSPHORUS: CPT | Performed by: NURSE PRACTITIONER

## 2022-02-14 PROCEDURE — 36415 COLL VENOUS BLD VENIPUNCTURE: CPT | Performed by: NURSE PRACTITIONER

## 2022-02-14 PROCEDURE — 80307 DRUG TEST PRSMV CHEM ANLYZR: CPT | Performed by: NURSE PRACTITIONER

## 2022-02-14 PROCEDURE — 25000003 PHARM REV CODE 250: Performed by: INTERNAL MEDICINE

## 2022-02-14 PROCEDURE — 83605 ASSAY OF LACTIC ACID: CPT | Performed by: EMERGENCY MEDICINE

## 2022-02-14 PROCEDURE — 86901 BLOOD TYPING SEROLOGIC RH(D): CPT | Performed by: NURSE PRACTITIONER

## 2022-02-14 PROCEDURE — 83735 ASSAY OF MAGNESIUM: CPT | Performed by: NURSE PRACTITIONER

## 2022-02-14 PROCEDURE — 63600175 PHARM REV CODE 636 W HCPCS: Performed by: INTERNAL MEDICINE

## 2022-02-14 PROCEDURE — 99222 PR INITIAL HOSPITAL CARE,LEVL II: ICD-10-PCS | Mod: ,,, | Performed by: INTERNAL MEDICINE

## 2022-02-14 PROCEDURE — 36415 COLL VENOUS BLD VENIPUNCTURE: CPT | Performed by: EMERGENCY MEDICINE

## 2022-02-14 PROCEDURE — 85025 COMPLETE CBC W/AUTO DIFF WBC: CPT | Performed by: NURSE PRACTITIONER

## 2022-02-14 PROCEDURE — 93306 TTE W/DOPPLER COMPLETE: CPT | Mod: 26,,, | Performed by: INTERNAL MEDICINE

## 2022-02-14 PROCEDURE — 80202 ASSAY OF VANCOMYCIN: CPT | Performed by: INTERNAL MEDICINE

## 2022-02-14 PROCEDURE — 12000002 HC ACUTE/MED SURGE SEMI-PRIVATE ROOM

## 2022-02-14 PROCEDURE — 93306 TTE W/DOPPLER COMPLETE: CPT

## 2022-02-14 PROCEDURE — 25000003 PHARM REV CODE 250: Performed by: NURSE PRACTITIONER

## 2022-02-14 PROCEDURE — 63600175 PHARM REV CODE 636 W HCPCS: Performed by: NURSE PRACTITIONER

## 2022-02-14 PROCEDURE — 81001 URINALYSIS AUTO W/SCOPE: CPT | Performed by: NURSE PRACTITIONER

## 2022-02-14 RX ORDER — TRAVOPROST OPHTHALMIC SOLUTION 0.04 MG/ML
1 SOLUTION OPHTHALMIC 2 TIMES DAILY
Status: DISCONTINUED | OUTPATIENT
Start: 2022-02-14 | End: 2022-02-14

## 2022-02-14 RX ORDER — POLYETHYLENE GLYCOL 3350 17 G/17G
17 POWDER, FOR SOLUTION ORAL DAILY
Status: DISCONTINUED | OUTPATIENT
Start: 2022-02-14 | End: 2022-02-18 | Stop reason: HOSPADM

## 2022-02-14 RX ORDER — LOSARTAN POTASSIUM 50 MG/1
50 TABLET ORAL DAILY
Status: DISCONTINUED | OUTPATIENT
Start: 2022-02-14 | End: 2022-02-14

## 2022-02-14 RX ORDER — AMLODIPINE BESYLATE 5 MG/1
10 TABLET ORAL DAILY
Status: DISCONTINUED | OUTPATIENT
Start: 2022-02-14 | End: 2022-02-14

## 2022-02-14 RX ORDER — TRAMADOL HYDROCHLORIDE 50 MG/1
50 TABLET ORAL 3 TIMES DAILY PRN
Status: DISCONTINUED | OUTPATIENT
Start: 2022-02-14 | End: 2022-02-18 | Stop reason: HOSPADM

## 2022-02-14 RX ORDER — VANCOMYCIN HCL IN 5 % DEXTROSE 1G/250ML
1000 PLASTIC BAG, INJECTION (ML) INTRAVENOUS ONCE
Status: COMPLETED | OUTPATIENT
Start: 2022-02-14 | End: 2022-02-15

## 2022-02-14 RX ORDER — BISACODYL 10 MG
10 SUPPOSITORY, RECTAL RECTAL ONCE
Status: COMPLETED | OUTPATIENT
Start: 2022-02-14 | End: 2022-02-14

## 2022-02-14 RX ORDER — SODIUM CHLORIDE 9 MG/ML
INJECTION, SOLUTION INTRAVENOUS CONTINUOUS
Status: ACTIVE | OUTPATIENT
Start: 2022-02-14 | End: 2022-02-14

## 2022-02-14 RX ORDER — ATORVASTATIN CALCIUM 40 MG/1
40 TABLET, FILM COATED ORAL NIGHTLY
Status: DISCONTINUED | OUTPATIENT
Start: 2022-02-14 | End: 2022-02-18 | Stop reason: HOSPADM

## 2022-02-14 RX ORDER — ASPIRIN 81 MG/1
81 TABLET ORAL DAILY
Status: DISCONTINUED | OUTPATIENT
Start: 2022-02-14 | End: 2022-02-18 | Stop reason: HOSPADM

## 2022-02-14 RX ORDER — CLONIDINE HYDROCHLORIDE 0.1 MG/1
0.1 TABLET ORAL 2 TIMES DAILY
Status: DISCONTINUED | OUTPATIENT
Start: 2022-02-14 | End: 2022-02-14

## 2022-02-14 RX ORDER — LATANOPROST 50 UG/ML
1 SOLUTION/ DROPS OPHTHALMIC NIGHTLY
Status: DISCONTINUED | OUTPATIENT
Start: 2022-02-14 | End: 2022-02-18 | Stop reason: HOSPADM

## 2022-02-14 RX ADMIN — LOSARTAN POTASSIUM 50 MG: 50 TABLET, FILM COATED ORAL at 09:02

## 2022-02-14 RX ADMIN — ASPIRIN 81 MG: 81 TABLET, DELAYED RELEASE ORAL at 08:02

## 2022-02-14 RX ADMIN — LATANOPROST 1 DROP: 50 SOLUTION OPHTHALMIC at 08:02

## 2022-02-14 RX ADMIN — CLONIDINE HYDROCHLORIDE 0.1 MG: 0.1 TABLET ORAL at 08:02

## 2022-02-14 RX ADMIN — AMLODIPINE BESYLATE 10 MG: 5 TABLET ORAL at 08:02

## 2022-02-14 RX ADMIN — PIPERACILLIN SODIUM AND TAZOBACTAM SODIUM 3.38 G: 3; .375 INJECTION, POWDER, LYOPHILIZED, FOR SOLUTION INTRAVENOUS at 08:02

## 2022-02-14 RX ADMIN — SODIUM CHLORIDE: 0.9 INJECTION, SOLUTION INTRAVENOUS at 06:02

## 2022-02-14 RX ADMIN — LATANOPROST 1 DROP: 50 SOLUTION OPHTHALMIC at 01:02

## 2022-02-14 RX ADMIN — BISACODYL 10 MG: 10 SUPPOSITORY RECTAL at 08:02

## 2022-02-14 RX ADMIN — Medication: at 11:02

## 2022-02-14 RX ADMIN — PIPERACILLIN AND TAZOBACTAM 4.5 G: 4; .5 INJECTION, POWDER, LYOPHILIZED, FOR SOLUTION INTRAVENOUS; PARENTERAL at 05:02

## 2022-02-14 RX ADMIN — ENOXAPARIN SODIUM 70 MG: 80 INJECTION SUBCUTANEOUS at 07:02

## 2022-02-14 RX ADMIN — POLYETHYLENE GLYCOL 3350 17 G: 17 POWDER, FOR SOLUTION ORAL at 08:02

## 2022-02-14 RX ADMIN — ATORVASTATIN CALCIUM 40 MG: 40 TABLET, FILM COATED ORAL at 08:02

## 2022-02-14 RX ADMIN — SODIUM CHLORIDE 500 ML: 0.9 INJECTION, SOLUTION INTRAVENOUS at 12:02

## 2022-02-14 NOTE — PROGRESS NOTES
Pharmacokinetic Initial Assessment: IV Vancomycin    Assessment/Plan:    Initiate intravenous vancomycin with loading dose of 1500 mg once with subsequent doses when random concentrations are less than 20 mcg/mL  Desired empiric serum trough concentration is 15 to 20 mcg/mL  Draw vancomycin random level on 2/14 at 19:00.  Pharmacy will continue to follow and monitor vancomycin.      Please contact pharmacy at extension [phone] with any questions regarding this assessment.     Thank you for the consult,   Marycruz Sepulveda       Patient brief summary:  Azul Clifford is a 81 y.o. female initiated on antimicrobial therapy with IV Vancomycin for treatment of suspected bacteremia    Drug Allergies:   Review of patient's allergies indicates:  No Known Allergies    Actual Body Weight:   62.1 kg    Renal Function:   Estimated Creatinine Clearance: 21.7 mL/min (A) (based on SCr of 1.7 mg/dL (H)).,     Dialysis Method (if applicable):  N/A    CBC (last 72 hours):  Recent Labs   Lab Result Units 02/13/22  1621 02/14/22  0254   WBC K/uL 15.78* 11.01   Hemoglobin g/dL 14.4 13.4   Hematocrit % 45.7 44.3   Platelets K/uL 184 125*   Gran % % 78.2* 71.0   Lymph % % 14.1* 17.8*   Mono % % 6.0 9.1   Eosinophil % % 0.5 1.0   Basophil % % 0.4 0.6   Differential Method  Automated Automated       Metabolic Panel (last 72 hours):  Recent Labs   Lab Result Units 02/13/22  1621 02/14/22  0254 02/14/22  0935   Sodium mmol/L 141 140  --    Potassium mmol/L 3.6 3.8  --    Chloride mmol/L 102 104  --    CO2 mmol/L 23 21*  --    Glucose mg/dL 183* 98  --    Glucose, UA   --   --  Negative   BUN mg/dL 30* 32*  --    Creatinine mg/dL 2.0* 1.7*  --    Albumin g/dL 3.6 3.2*  --    Total Bilirubin mg/dL 1.3* 1.0  --    Alkaline Phosphatase U/L 129 105  --    AST U/L 16 12  --    ALT U/L 10 6*  --    Magnesium mg/dL  --  2.1  --    Phosphorus mg/dL  --  2.9  --        Drug levels (last 3 results):  No results for input(s): VANCOMYCINRA, VANCOMYCINPE, VANCOMYCINTR  in the last 72 hours.    Microbiologic Results:  Microbiology Results (last 7 days)       Procedure Component Value Units Date/Time    Urine culture [[id number]] Collected: 02/14/22 0935    Order Status: No result Specimen: Urine Updated: 02/14/22 1022    Blood culture #2 **CANNOT BE ORDERED STAT** [[id number]] Collected: 02/13/22 1702    Order Status: Completed Specimen: Blood from Peripheral, Forearm, Right Updated: 02/14/22 1002     Blood Culture, Routine Gram stain aer bottle: Gram positive cocci      Results called to and read back by:Corrie Currie [id number];        02/14/2022  10:02 CJD    Blood culture #1 **CANNOT BE ORDERED STAT** [[id number]] Collected: 02/13/22 1621    Order Status: Completed Specimen: Blood from Peripheral, Forearm, Right Updated: 02/13/22 2317     Blood Culture, Routine No Growth to date

## 2022-02-14 NOTE — ASSESSMENT & PLAN NOTE
Ms. Clifford has now been diagnosed with multiple episodes of documented DVT/PE and will need to be on long term anticoagulant.  She likely has an underlying blood clotting disorder and I can work this up as an OP but this will not impact the decision for long term anticoagulation.  She is on Lovenox at this time and would continue this with plans to transition her to oral anticoagulant with Eliquis.   Will arrange follow up with her in the office after discharge.

## 2022-02-14 NOTE — NURSING
"NP Harris notified that Patient is refusing catheterization, alert and oriented x4. states "that hurt, 3 of them tried in ED"      "

## 2022-02-14 NOTE — NURSING
"Chen in lab notified that repeat lactic was not drawn. I attempted to draw lactic lab, two attempts were unsuccessful. "I will be up as soon as I can" was the 's response. Will continue to monitor.   "

## 2022-02-14 NOTE — HOSPITAL COURSE
Patient reports 2 day history of bilateral leg pain and weakness, possibly shortness of breath but poor historian.  States she lives alone.  Reportedly had syncopal episode with collapse, in the ED was noted to be hypoxic, noncompliant with anticoagulation.  Saturations improved, vital stable, lactic acid 4.3 but improved to 1.9.  Lower extremity with extensive bilateral DVT, CKD stage 3, V/Q scan with intermediate to high probability acute PE, started on therapeutic dose Lovenox.  On 02/14, hemodynamically stable, echocardiogram pending, 1/2 blood culture preliminary with [facility] (on vancomycin), vascular and Hematology consult pending.    02/15/2022  Ms Clifford notes some leg pain. She is a poor historian    2/16/2022  Ms Clifford still feels weak with occasional leg cramps. She denies fever, chills SOB or chest pain. Per her family she will live at [facility] with a family member staying with her     1/17/2022  Doctor I want to get out of here. When can I go and I do not like sitting in this chair    1/18/2022  Ms Clifford has no complaints save that she wants to go home I have updated her daughter. Please follow the Lactulose instructions  ROS see above otherwise negative X 9   PE in no distress HEENT OLIVA EOM intact moist mucus membranes Neck supple no use of accessory muscles Lungs no crackles wheezes or rhonchi Heart S 1 S 2 RRR no murmur Abdomen BS+ nontender no distention Ext without CC or E pulses 1-2+ Skin no acute finding Neuro no acute sensory or motor deficit

## 2022-02-14 NOTE — SUBJECTIVE & OBJECTIVE
Oncology Treatment Plan:   [No treatment plan]    Medications:  Continuous Infusions:  Scheduled Meds:   aspirin  81 mg Oral Daily    atorvastatin  40 mg Oral QHS    cloNIDine  0.1 mg Oral BID    enoxaparin  1 mg/kg Subcutaneous Q24H    latanoprost  1 drop Both Eyes QHS    losartan  50 mg Oral Daily    polyethylene glycol  17 g Oral Daily    white petrolatum   Topical (Top) Daily     PRN Meds:acetaminophen, acetaminophen, bisacodyL, dextrose 50%, dextrose 50%, glucagon (human recombinant), glucose, glucose, hydrALAZINE, insulin aspart U-100, magnesium oxide, magnesium oxide, naloxone, ondansetron, potassium bicarbonate, potassium bicarbonate, potassium bicarbonate, potassium, sodium phosphates, potassium, sodium phosphates, potassium, sodium phosphates, sodium chloride 0.9%, traMADoL, Pharmacy to dose Vancomycin consult **AND** vancomycin - pharmacy to dose     Review of patient's allergies indicates:  No Known Allergies     Past Medical History:   Diagnosis Date    Arthritis     DM2 (diabetes mellitus, type 2)     Glaucoma capsular     HLD (hyperlipidemia)     Hypertension     Kidney stones     Skin lesion of breast      Past Surgical History:   Procedure Laterality Date    BREAST SURGERY      removed lump to right breast     EYE SURGERY      URETERAL STENT PLACEMENT      and removed     Family History     Problem Relation (Age of Onset)    Coronary artery disease Mother    Diabetes Mother        Tobacco Use    Smoking status: Former Smoker    Smokeless tobacco: Never Used   Substance and Sexual Activity    Alcohol use: No    Drug use: No    Sexual activity: Not on file       Review of Systems   Constitutional: Negative for activity change, appetite change, diaphoresis, fatigue, fever and unexpected weight change.   HENT: Negative for congestion and hearing loss.    Eyes: Negative for visual disturbance.   Respiratory: Negative for cough, chest tightness and shortness of breath.     Cardiovascular: Negative for chest pain and leg swelling.   Gastrointestinal: Negative for abdominal pain, blood in stool, diarrhea, nausea and vomiting.   Endocrine: Negative for cold intolerance and heat intolerance.   Genitourinary: Negative for difficulty urinating, dysuria and hematuria.   Neurological: Negative for dizziness and headaches.   Hematological: Negative for adenopathy. Does not bruise/bleed easily.   Psychiatric/Behavioral: Negative for behavioral problems.     Objective:     Vital Signs (Most Recent):  Temp: 98.8 °F (37.1 °C) (02/14/22 1639)  Pulse: 87 (02/14/22 1639)  Resp: 18 (02/14/22 1639)  BP: (!) 84/62 (02/14/22 1639)  SpO2: 97 % (02/14/22 1639) Vital Signs (24h Range):  Temp:  [98.2 °F (36.8 °C)-98.8 °F (37.1 °C)] 98.8 °F (37.1 °C)  Pulse:  [] 87  Resp:  [16-18] 18  SpO2:  [96 %-99 %] 97 %  BP: ()/() 84/62     Weight: 62.1 kg (136 lb 14.5 oz)  Body mass index is 25.04 kg/m².  Body surface area is 1.65 meters squared.      Intake/Output Summary (Last 24 hours) at 2/14/2022 1748  Last data filed at 2/14/2022 0442  Gross per 24 hour   Intake 240 ml   Output --   Net 240 ml       Physical Exam  Constitutional:       Appearance: She is well-developed and well-nourished.   HENT:      Head: Normocephalic and atraumatic.      Right Ear: External ear normal.      Left Ear: External ear normal.      Mouth/Throat:      Mouth: Oropharynx is clear and moist.   Eyes:      Conjunctiva/sclera: Conjunctivae normal.      Pupils: Pupils are equal, round, and reactive to light.   Neck:      Thyroid: No thyromegaly.      Trachea: No tracheal deviation.   Cardiovascular:      Rate and Rhythm: Normal rate and regular rhythm.      Heart sounds: Normal heart sounds.   Pulmonary:      Effort: Pulmonary effort is normal.      Breath sounds: Normal breath sounds.   Abdominal:      General: Bowel sounds are normal. There is no distension.      Palpations: Abdomen is soft. There is no mass.       Tenderness: There is no abdominal tenderness.   Musculoskeletal:         General: No edema.   Skin:     Findings: No rash.   Neurological:      Comments: Neuro intact througout   Psychiatric:         Mood and Affect: Mood and affect normal.         Behavior: Behavior normal.         Thought Content: Thought content normal.         Judgment: Judgment normal.         Significant Labs:   CBC:   Recent Labs   Lab 02/13/22  1621 02/14/22  0254   WBC 15.78* 11.01   HGB 14.4 13.4   HCT 45.7 44.3    125*    and CMP:   Recent Labs   Lab 02/13/22  1621 02/14/22  0254    140   K 3.6 3.8    104   CO2 23 21*   * 98   BUN 30* 32*   CREATININE 2.0* 1.7*   CALCIUM 9.9 9.2   PROT 7.6 7.1   ALBUMIN 3.6 3.2*   BILITOT 1.3* 1.0   ALKPHOS 129 105   AST 16 12   ALT 10 6*   ANIONGAP 16 15   EGFRNONAA 22.6* 27.5*       Diagnostic Results:  None

## 2022-02-14 NOTE — PROGRESS NOTES
Pharmacist Renal Dose Adjustment Note    Azul Clifford is a 81 y.o. female being treated with the medication pip-tazo    Patient Data:    Vital Signs (Most Recent):  Temp: 98.6 °F (37 °C) (02/13/22 2121)  Pulse: 101 (02/13/22 2121)  Resp: 18 (02/13/22 2121)  BP: (!) 158/99 (02/13/22 2121)  SpO2: 98 % (02/13/22 2121)   Vital Signs (72h Range):  Temp:  [98.1 °F (36.7 °C)-98.6 °F (37 °C)]   Pulse:  [101-116]   Resp:  [18-40]   BP: (142-158)/(88-99)   SpO2:  [95 %-98 %]      Recent Labs   Lab 02/13/22  1621   CREATININE 2.0*     Serum creatinine: 2 mg/dL (H) 02/13/22 1621  Estimated creatinine clearance: 19.3 mL/min (A)    Medication:pip-tazo 4.5 g IV q 8 hours will be changed to 4.5 g q 12 hours per crcl < 20 ml/min     Pharmacist's Name: Jesus Sheth  Pharmacist's Extension: [phone]

## 2022-02-14 NOTE — PROGRESS NOTES
Critical access hospital Medicine  Progress Note    Patient Name: Azul Clifford  MRN: [id number]  Patient Class: IP- Inpatient   Admission Date: 2/13/2022  Length of Stay: 1 days  Attending Physician: Janette Juan MD  Primary Care Provider: Andrew Francisco Iv, MD        Subjective:     Principal Problem:Acute bilateral deep vein thrombosis (DVT) of femoral veins        HPI:  Azul Clifford is a 81 y.o. Black or  female with known history of an, diabetes mellitus, DVT, PE/DVT (noncompliance with anticoagulation), hyperlipidemia who presented with a primary complaint of Loss of Consciousness (Found unresponsive, hypoxic to 60s in hallway)  History was obtained from the patient and collateral obtained from the family present at the bedside and ER physician Sign-out.      States that she did not feel well yesterday, having left leg pain, abdominal pain/constipation.  Also reports mild shortness of breath.  Denies fever, chills, chest pain, nausea, vomiting, diarrhea, urinary symptoms.  Slept a lot yesterday.  Has not been taking medication as prescribed, especially her blood thinner.     Earlier today, she walked to open the door when her daughter got to her house. She walked back and sat on a chair. She complained of being tired and shortly became unresponsive - stop talking and not answering questions. EMS was activated and she subsequently brought to the ED.      Per ED physician, EMS reported that was awake, alert, and oriented with stable vital signs.  She became shortness of breath, tachycardic, with SpO2 in the 60 in the ED hallway  CBC - WBC 15.78, hemoglobin 14.4, hematocrit 45.7, platelet 187  CMP - potassium 3.6, BUN 30, creatinine 2.0, estimated GFR 26, glucose 183  BNP -38  Troponin - 0.032  COVID 19 - Negative  Lactic acid elevated 4.3  EKG - sinus tachycardia, rate 113, left ventricular hypertrophy, no ST elevation  CXR -  IMPRESSION:  1. No acute pulmonary process.  2.  Prominence of the aortic knob suggestive of an aneurysm or dissection, new/worse when compared to the previous exam, consider dedicated contrast-enhanced CT follow-up.  NM lung scan - Intermediate-to high probability for acute PE.  CT Head without contrast - no acute intracranial process  CT cervical spine - no acute fracture or traumatic malalignment the cervical spine  CT head abdomen pelvis -  IMPRESSION:  1. Prominence of the left greater than right lower extremity deep venous vessels from the common iliac to the visualized proximal thigh with mild adjacent fat stranding suspicious for an occult deep venous thrombus and adjacent edema, consider correlation with lower extremity Doppler ultrasound.  2. Fusiform infrarenal abdominal aortic aneurysm measuring 3.4 cm in diameter, there is mild fat stranding along the distal abdominal aorta and iliacs, possibly from underlying deep venous thrombosis, noting that aortic pathology is also a consideration (aortitis, or less likely a leaking aneurysm).  3. 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  4. Mild cardiomegaly and scattered coronary arterial calcifications (3 vessel disease).  5. Large stool ball in the rectal vault, consider correlation for fecal impaction, and/or constipation, with a moderate volume of stool and gas in the remainder the colon.  6. Small nonobstructing left-sided renal stone measuring 2 mm.  7. Sludge in the gallbladder without findings of acute cholecystitis.  8. Numerous additional, and incidental findings as noted above.  US venous LEs -  Extensive DVT bilaterally, occlusive throughout the left lower extremity and occlusive in the right femoral vein.      Overview/Hospital Course:  Patient reports 2 day history of bilateral leg pain and weakness, possibly shortness of breath but poor historian.  States she lives alone.  Reportedly had syncopal episode with collapse, in the ED was noted to be  "hypoxic, noncompliant with anticoagulation.  Saturations improved, vital stable, lactic acid 4.3 but improved to 1.9.  Lower extremity with extensive bilateral DVT, CKD stage 3, V/Q scan with intermediate to high probability acute PE, started on therapeutic dose Lovenox.  On 02/14, hemodynamically stable, echocardiogram pending, 1/2 blood culture preliminary with GPC (on vancomycin), vascular and Hematology consult pending.      Interval History:  See hospital course.  Patient reports for about 2 days she had bilateral leg pain with generalized weakness.  She is not able to state if she was more short of breath, reports diffuse body pains.  Does admit to constipation without nausea or vomiting.  States she lives alone.  When asked why she was not compliant with medications states "I don't know." Afebrile with T-max 98.6°.  On room air.  Labs with WBC 11, platelet 125, BUN/creatinine 32/1.7, repeat lactic acid 1.9.  Ultrasound legs extensive bilateral DVT.  Perfusion scan with intermediate to high probability right lung PE.  Discussed with patient.  No visitors at bedside.    Review of Systems   Constitutional: Negative for fever.   Respiratory: Positive for shortness of breath.    Cardiovascular: Negative for chest pain and leg swelling.   Gastrointestinal: Positive for constipation. Negative for nausea and vomiting.   Neurological: Positive for weakness.     Objective:     Vital Signs (Most Recent):  Temp: 98.6 °F (37 °C) (02/14/22 0722)  Pulse: 92 (02/14/22 0722)  Resp: 18 (02/14/22 0722)  BP: (!) 152/98 (nn) (02/14/22 0722)  SpO2: 99 % (02/14/22 0722) Vital Signs (24h Range):  Temp:  [98.1 °F (36.7 °C)-98.6 °F (37 °C)] 98.6 °F (37 °C)  Pulse:  [] 92  Resp:  [16-40] 18  SpO2:  [95 %-99 %] 99 %  BP: (142-158)/() 152/98     Weight: 62.1 kg (136 lb 14.5 oz)  Body mass index is 25.04 kg/m².    Intake/Output Summary (Last 24 hours) at 2/14/2022 1034  Last data filed at 2/14/2022 0442  Gross per 24 hour "   Intake 240 ml   Output --   Net 240 ml      Physical Exam  Vitals and nursing note reviewed.   Constitutional:       Comments: Elderly chronically ill appearing female lying in bed, cooperative   HENT:      Head: Normocephalic and atraumatic.      Mouth/Throat:      Mouth: Mucous membranes are moist.      Comments: edentulous  Cardiovascular:      Rate and Rhythm: Regular rhythm. Tachycardia present.      Heart sounds: Murmur heard.        Comments: No lower extremity edema  Pulmonary:      Effort: No respiratory distress.      Breath sounds: No stridor. No wheezing.      Comments: On room air  Abdominal:      General: Bowel sounds are normal. There is no distension.      Palpations: Abdomen is soft.      Tenderness: There is no abdominal tenderness. There is no guarding.   Skin:     Comments: Dry skin legs   Neurological:      Mental Status: She is alert and oriented to person, place, and time.      Comments: Answering questions, moving all 4 extremities   Psychiatric:         Mood and Affect: Mood normal.         Significant Labs:   Blood Culture:   Recent Labs   Lab 02/13/22  1621 02/13/22  1702   LABBLOO No Growth to date Gram stain aer bottle: Gram positive cocci  Results called to and read back by:Corrie Currie [id number];    02/14/2022  10:02 CJD     BMP:   Recent Labs   Lab 02/14/22  0254   GLU 98      K 3.8      CO2 21*   BUN 32*   CREATININE 1.7*   CALCIUM 9.2   MG 2.1     CBC:   Recent Labs   Lab 02/13/22  1621 02/14/22  0254   WBC 15.78* 11.01   HGB 14.4 13.4   HCT 45.7 44.3    125*     CMP:   Recent Labs   Lab 02/13/22  1621 02/14/22  0254    140   K 3.6 3.8    104   CO2 23 21*   * 98   BUN 30* 32*   CREATININE 2.0* 1.7*   CALCIUM 9.9 9.2   PROT 7.6 7.1   ALBUMIN 3.6 3.2*   BILITOT 1.3* 1.0   ALKPHOS 129 105   AST 16 12   ALT 10 6*   ANIONGAP 16 15   EGFRNONAA 22.6* 27.5*     Cardiac Markers:   Recent Labs   Lab 02/13/22  1621   BNP 38     Lactic Acid:    Recent Labs   Lab 02/13/22  1621 02/14/22  0254   LACTATE 4.3* 1.9  1.9     Magnesium:   Recent Labs   Lab 02/14/22  0254   MG 2.1     POCT Glucose: No results for input(s): POCTGLUCOSE in the last 48 hours.  Troponin:   Recent Labs   Lab 02/13/22  1621   TROPONINI 0.032     TSH: No results for input(s): TSH in the last 4320 hours.  Urine Culture: No results for input(s): LABURIN in the last 48 hours.  Urine Studies:   Recent Labs   Lab 02/14/22  0935   COLORU Red*   APPEARANCEUA Cloudy*   PHUR 6.0   SPECGRAV 1.025   PROTEINUA SEE COMMENT   GLUCUA Negative   KETONESU Trace*   BILIRUBINUA Negative   OCCULTUA 3+*   NITRITE SEE COMMENT   UROBILINOGEN SEE COMMENT   LEUKOCYTESUR Trace*   RBCUA >100*   WBCUA 30*   BACTERIA Few*   SQUAMEPITHEL 15   HYALINECASTS 23*       Significant Imaging: I have reviewed and interpreted all pertinent imaging results/findings within the past 24 hours.     CT Head Without Contrast    Result Date: 2/13/2022  CMS MANDATED QUALITY DATA - CT RADIATION 436 All CT scans at this facility utilize dose modulation, iterative reconstruction, and/or weight based dosing when appropriate to reduce radiation dose to as low as reasonably achievable. CLINICAL HISTORY: 81 years (1940) Female Syncope, recurrent; Mental status change, unknown cause TECHNIQUE: CT HEAD WITHOUT IV CONTRAST, CT CHEST ABDOMEN PELVIS WITHOUT IV CONTRAST, CT CERVICAL SPINE WITHOUT IV CONTRAST. 106 images obtained. Axial CT of the brain without contrast using soft tissue and bone algorithm. . COMPARISON: None available. FINDINGS: There is a small focus of soft tissue swelling over the right occipital parietal bone with no acute intracranial hemorrhage, hydrocephalus, herniation or midline shift and the basal and suprasellar cisterns are patent. No acute skull fracture is identified. There is a small rounded defect in the left caudate head (image 19), and right basal ganglia (image 21) favored to represent remote lacunar  infarcts. Mild periventricular deep cerebral white matter low attenuation, slightly more pronounced in the anterior frontal lobes a nonspecific finding in this age group which can be seen in any diffuse white matter process but which is most commonly associated with chronic microvascular ischemic disease. There are scattered atheromatous calcifications in the intracranial internal carotid arteries. Orbital contents appear within normal limits. External auditory canals are unremarkable. The visualized paranasal sinuses and mastoid air cells are essentially clear. IMPRESSION: 1. No acute intracranial process. 2. Chronic/involutional findings as noted above. CMS MANDATED QUALITY DATA - CT RADIATION  436 All CT scans at this facility utilize dose modulation, iterative reconstruction, and/or weight based dosing when appropriate to reduce radiation dose to as low as reasonably achievable. CLINICAL HISTORY: 81 years (1940) Female Syncope, recurrent; Mental status change, unknown cause TECHNIQUE: CT HEAD WITHOUT IV CONTRAST, CT CHEST ABDOMEN PELVIS WITHOUT IV CONTRAST, CT CERVICAL SPINE WITHOUT IV CONTRAST. 435 images obtained. Contiguous thin section axial images were obtained of the cervical spine. Sagittal and coronal reformatted images were generated. Note that this exam is suboptimal for evaluation of disc disease (better evaluated on MRI) and does not assess for ligamentous injury or stability. COMPARISON: None available. FINDINGS: No acute fracture of the cervical spine. No traumatic malalignment of the cervical spine.  There is reversal of the normal lordotic curvature of the cervical spine. No perched, jumped or locked facets seen. Moderate to severe disc height loss and bilateral facet arthropathy at C3-C4, C4-C5 and C5-C6. Additional degenerative changes are present (for which this study is not tailored to assess) Visualized brain is unremarkable. Scattered calcified plaques are seen at the level of the  carotids bilaterally. IMPRESSION: 1. No acute fracture or traumatic malalignment the cervical spine. 2. Reversal of the normal lordotic curvature, likely secondary to a combination of positioning, degenerative change and/or muscle spasm. CMS MANDATED QUALITY DATA - CT RADIATION  436 All CT scans at this facility utilize dose modulation, iterative reconstruction, and/or weight based dosing when appropriate to reduce radiation dose to as low as reasonably achievable. CLINICAL HISTORY: 81 years (1940) Female Syncope, recurrent; Mental status change, unknown cause TECHNIQUE: CT HEAD WITHOUT IV CONTRAST, CT CHEST ABDOMEN PELVIS WITHOUT IV CONTRAST, CT CERVICAL SPINE WITHOUT IV CONTRAST.  417 images obtained. Axial CT images of the chest, abdomen and pelvis were obtained from the thoracic inlet to the proximal thigh. CONTRAST: No IV contrast was administered COMPARISON: None available. FINDINGS: Evaluation of the solid organs and vasculature is suboptimal without contrast. CT Chest: Visualized neck: The thyroid gland appears heterogeneous without dominant nodule. Lungs: There is dependent atelectasis in the bilateral lower lobes.  The lungs are otherwise clear. Airway: The trachea and central bronchial tree appear normal. Pleura: There is no pleural effusion. There is no pneumothorax. Cardiovascular: Heart is normal in size, there are scattered coronary arterial calcifications (3 vessel disease). There are calcified plaques at the aortic arch, and aneurysmal dilation of the apex of the aortic arch measuring up to 4.6 (coronal image 1:15 cm in diameter (image 25) Mediastinum: There is no supraclavicular, axillary, mediastinal, or hilar lymphadenopathy. Soft tissues: The peripheral soft tissues appear normal. Musculoskeletal: The visualized osseous structures appear normal.  There are no suspicious osseous lesions. Esophagus: The esophagus is grossly normal.  There is a small hiatal hernia. CT Abdomen: Liver: The liver  is normal in size and imaging appearance. Gallbladder: There is sludge in the gallbladder without wall thickening, pericholecystic fluid or finding to suggest acute cholecystitis. Biliary Tree: No intra or extrahepatic ductal dilation. Spleen: Within normal limits. Pancreas: The pancreas is normal. Adrenal Glands: -- Right: The right adrenal gland is normal in appearance, without evidence of nodule or mass. 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 Kidneys: No hydronephrosis, hydroureter or evidence of obstructive uropathy. There is a 2 mm nonobstructing upper pole left renal stone. There are several rounded simple fluid attenuation structures in the right kidney favored to represent simple cysts, and completely assessed on this noncontrasted exam, consider nonemergent outpatient follow-up ultrasound. Vasculature: Scattered calcified plaques are seen in the abdominal aorta. There is aneurysmal dilation of the infrarenal abdominal aorta measuring up to 3.4 x 3.5 cm (image 178) there is mild fat stranding around the distal abdominal aorta and origin iliacs. There is ectasia of the common iliacs measuring 19 mm in diameter on the right, and 15 mm diameter on the left. There is mild prominence of the left greater than right common iliac vein and left lower extremity deep venous vessels suspicious for thrombus, with mild fat stranding. Lymph nodes: No abdominal lymphadenopathy is seen. Intraperitoneal structures: There is no ascites. Bowel: There is a large stool ball seen in the rectal vault (consider correlation for fecal impaction and/or constipation. There is a moderate the appendix is normal (image 156). Volume of stool and gas in the remainder the colon. Abdominal wall: The abdominal wall and musculature are normal. Musculoskeletal: There is moderate to severe disc height loss at L4-L5 with grade 1 anterolisthesis.  There is severe bilateral facet arthropathy at this level. Additional degenerative changes are seen (for which this study is not tailored to assess). CT Pelvis: Bladder: The urinary bladder is within normal limits. Reproductive Organs: Uterus is heterogeneous and may contain a small calcified myometrial masses favored to represent a fibroid. There is an incidentally noted 18 x 12 mm simple cyst in the right ovary. Left ovary is within normal limits. Pelvic Lymph nodes: No pelvic lymphadenopathy or pelvic mass is identified. IMPRESSION: 1. Prominence of the left greater than right lower extremity deep venous vessels from the common iliac to the visualized proximal thigh with mild adjacent fat stranding suspicious for an occult deep venous thrombus and adjacent edema, consider correlation with lower extremity Doppler ultrasound. 2. Fusiform infrarenal abdominal aortic aneurysm measuring 3.4 cm in diameter, there is mild fat stranding along the distal abdominal aorta and iliacs, possibly from underlying deep venous thrombosis, noting that aortic pathology is also a consideration (aortitis, or less likely a leaking aneurysm). 3. 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 4. Mild cardiomegaly and scattered coronary arterial calcifications (3 vessel disease). 5. Large stool ball in the rectal vault, consider correlation for fecal impaction, and/or constipation, with a moderate volume of stool and gas in the remainder the colon. 6. Small nonobstructing left-sided renal stone measuring 2 mm. 7. Sludge in the gallbladder without findings of acute cholecystitis. 8. Numerous additional, and incidental findings as noted above. RESULT NOTIFICATION: These observations were discussed by the dictating physician, by phone and acknowledged by the ordering provider HOMAR LANGE at 2/13/2022 7:11 PM CST [facility] Best Practice Recommendations:  Abdominal Aortic Aneurysm FUSIFORM  AORTIC ANEURYSM: AAA Size:                          Follow-up Recommendation : 2.6-2.9 cm                        Every 5 years 3.0-3.4 cm                        Every 3 years 3.5-3.9 cm                        Every 2 years 4.0-4.4 cm                        Every 12 months, vascular consultation recommended 4.5-5.4 cm                        Every 6 months, vascular consultation recommended >5.5 cm                             Vascular surgery consultation recommended 1.  These RP Best Practice recomendations are based on the ACR white paper: J Am Etienne Radiol 2013;10:789-794 2.  For aortas with maximum diameter of 2.6cm and below,  no followup 3.      Saccular AAA of any size:  Recommend vascular consult. 4.      Enlarging AAA > 0.5cm in 6 mos or >1cm in 1 year:  recommend vascular consult [facility] Best Practice Guidelines:  Benign-appearing simple adnexal cysts on CT with IV contrast and MR: (1)(2) Pre-menopause(3) (<= 50 years if LMP unknown): <=5 cm: No follow-up imaging recommended >5 cm - <=7 cm:  US f/u 6-12 weeks >7 cm: Consider MR w/IVC or surgical evaluation Early post-menopause (<=5 years from LMP; > 50 years to <= 55 years if LMP unknown): <=3 cm: No follow-up imaging recommended (4) >3 cm - <=5 cm: US f/u 6-12 months (4) >5 cm - <=7 cm: US f/u promptly >7cm: Consider MR w/IVC or surgical evaluation Late post-menopause (>5 years from LMP; > 55 years if LMP unknown): <=3 cm: No follow-up imaging recommended >3 cm - <=7 cm: US f/u promptly >7 cm: Consider MR w/IVC or surgical evaluation Recommendations for Probably benign adnexal cysts on CT and MR:(1)(2) (benign-appearing cysts on non IV-contrast CT or with one or more of the following complicating factors: angulated margins, not round or oval, poorly visualized such as obscured by artifact or low S/N.) Pre-menopause (<= 50 years if LMP unknown): <=3 cm: No follow-up imaging recommended >3 cm - <=5 cm: US f/u 6-12 weeks >5 cm - <=7 cm: US f/u  promptly >7 cm: Consider MR w/IVC or surgical evaluation Early post-menopause (<=5 years from LMP; > 50 years to <= 55 years if LMP unknown): <=3 cm: No follow-up imaging recommended >3 cm - <=7 cm: US f/u promptly >7 cm: Consider MR w/IVC or surgical evaluation Late post-menopause (>5 years from LMP; > 55 years if LMP unknown): <=1 cm: No follow-up imaging recommended >1 cm - <=7 cm: US f/u promptly >7 cm: Consider MR w/IVC or surgical evaluation ____________________________________________________________________________________ (1)Recommendations based on the 2013 ACR White Paper for Managing Incidental Adnexal Findings on Abdominal and Pelvic CT and MRI: J Am Etienne Radiol 2013;10:675-681 (2)Excludes normal/benign findings such as ovarian calcifications w/o associated non-calcified mass, corpus luteum cyst, previously characterized cyst and cyst with documented stability in size and appearance for >2 years (3)Includes cysts with layering hemorrhage (4)If internal hemorrhage suspected in cyst, US f/u 6-12 weeks Electronically signed by:  Kahlil Donahue MD  2/13/2022 7:16 PM CST Workstation: [id number]    CT Cervical Spine Without Contrast    Result Date: 2/13/2022  CMS MANDATED QUALITY DATA - CT RADIATION 436 All CT scans at this facility utilize dose modulation, iterative reconstruction, and/or weight based dosing when appropriate to reduce radiation dose to as low as reasonably achievable. CLINICAL HISTORY: 81 years (1940) Female Syncope, recurrent; Mental status change, unknown cause TECHNIQUE: CT HEAD WITHOUT IV CONTRAST, CT CHEST ABDOMEN PELVIS WITHOUT IV CONTRAST, CT CERVICAL SPINE WITHOUT IV CONTRAST. 106 images obtained. Axial CT of the brain without contrast using soft tissue and bone algorithm. . COMPARISON: None available. FINDINGS: There is a small focus of soft tissue swelling over the right occipital parietal bone with no acute intracranial hemorrhage, hydrocephalus, herniation or midline shift  and the basal and suprasellar cisterns are patent. No acute skull fracture is identified. There is a small rounded defect in the left caudate head (image 19), and right basal ganglia (image 21) favored to represent remote lacunar infarcts. Mild periventricular deep cerebral white matter low attenuation, slightly more pronounced in the anterior frontal lobes a nonspecific finding in this age group which can be seen in any diffuse white matter process but which is most commonly associated with chronic microvascular ischemic disease. There are scattered atheromatous calcifications in the intracranial internal carotid arteries. Orbital contents appear within normal limits. External auditory canals are unremarkable. The visualized paranasal sinuses and mastoid air cells are essentially clear. IMPRESSION: 1. No acute intracranial process. 2. Chronic/involutional findings as noted above. CMS MANDATED QUALITY DATA - CT RADIATION  436 All CT scans at this facility utilize dose modulation, iterative reconstruction, and/or weight based dosing when appropriate to reduce radiation dose to as low as reasonably achievable. CLINICAL HISTORY: 81 years (1940) Female Syncope, recurrent; Mental status change, unknown cause TECHNIQUE: CT HEAD WITHOUT IV CONTRAST, CT CHEST ABDOMEN PELVIS WITHOUT IV CONTRAST, CT CERVICAL SPINE WITHOUT IV CONTRAST. 435 images obtained. Contiguous thin section axial images were obtained of the cervical spine. Sagittal and coronal reformatted images were generated. Note that this exam is suboptimal for evaluation of disc disease (better evaluated on MRI) and does not assess for ligamentous injury or stability. COMPARISON: None available. FINDINGS: No acute fracture of the cervical spine. No traumatic malalignment of the cervical spine.  There is reversal of the normal lordotic curvature of the cervical spine. No perched, jumped or locked facets seen. Moderate to severe disc height loss and bilateral  facet arthropathy at C3-C4, C4-C5 and C5-C6. Additional degenerative changes are present (for which this study is not tailored to assess) Visualized brain is unremarkable. Scattered calcified plaques are seen at the level of the carotids bilaterally. IMPRESSION: 1. No acute fracture or traumatic malalignment the cervical spine. 2. Reversal of the normal lordotic curvature, likely secondary to a combination of positioning, degenerative change and/or muscle spasm. CMS MANDATED QUALITY DATA - CT RADIATION  436 All CT scans at this facility utilize dose modulation, iterative reconstruction, and/or weight based dosing when appropriate to reduce radiation dose to as low as reasonably achievable. CLINICAL HISTORY: 81 years (1940) Female Syncope, recurrent; Mental status change, unknown cause TECHNIQUE: CT HEAD WITHOUT IV CONTRAST, CT CHEST ABDOMEN PELVIS WITHOUT IV CONTRAST, CT CERVICAL SPINE WITHOUT IV CONTRAST.  417 images obtained. Axial CT images of the chest, abdomen and pelvis were obtained from the thoracic inlet to the proximal thigh. CONTRAST: No IV contrast was administered COMPARISON: None available. FINDINGS: Evaluation of the solid organs and vasculature is suboptimal without contrast. CT Chest: Visualized neck: The thyroid gland appears heterogeneous without dominant nodule. Lungs: There is dependent atelectasis in the bilateral lower lobes.  The lungs are otherwise clear. Airway: The trachea and central bronchial tree appear normal. Pleura: There is no pleural effusion. There is no pneumothorax. Cardiovascular: Heart is normal in size, there are scattered coronary arterial calcifications (3 vessel disease). There are calcified plaques at the aortic arch, and aneurysmal dilation of the apex of the aortic arch measuring up to 4.6 (coronal image 1:15 cm in diameter (image 25) Mediastinum: There is no supraclavicular, axillary, mediastinal, or hilar lymphadenopathy. Soft tissues: The peripheral soft tissues  appear normal. Musculoskeletal: The visualized osseous structures appear normal.  There are no suspicious osseous lesions. Esophagus: The esophagus is grossly normal.  There is a small hiatal hernia. CT Abdomen: Liver: The liver is normal in size and imaging appearance. Gallbladder: There is sludge in the gallbladder without wall thickening, pericholecystic fluid or finding to suggest acute cholecystitis. Biliary Tree: No intra or extrahepatic ductal dilation. Spleen: Within normal limits. Pancreas: The pancreas is normal. Adrenal Glands: -- Right: The right adrenal gland is normal in appearance, without evidence of nodule or mass. 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 Kidneys: No hydronephrosis, hydroureter or evidence of obstructive uropathy. There is a 2 mm nonobstructing upper pole left renal stone. There are several rounded simple fluid attenuation structures in the right kidney favored to represent simple cysts, and completely assessed on this noncontrasted exam, consider nonemergent outpatient follow-up ultrasound. Vasculature: Scattered calcified plaques are seen in the abdominal aorta. There is aneurysmal dilation of the infrarenal abdominal aorta measuring up to 3.4 x 3.5 cm (image 178) there is mild fat stranding around the distal abdominal aorta and origin iliacs. There is ectasia of the common iliacs measuring 19 mm in diameter on the right, and 15 mm diameter on the left. There is mild prominence of the left greater than right common iliac vein and left lower extremity deep venous vessels suspicious for thrombus, with mild fat stranding. Lymph nodes: No abdominal lymphadenopathy is seen. Intraperitoneal structures: There is no ascites. Bowel: There is a large stool ball seen in the rectal vault (consider correlation for fecal impaction and/or constipation. There is a moderate the appendix is normal  (image 156). Volume of stool and gas in the remainder the colon. Abdominal wall: The abdominal wall and musculature are normal. Musculoskeletal: There is moderate to severe disc height loss at L4-L5 with grade 1 anterolisthesis. There is severe bilateral facet arthropathy at this level. Additional degenerative changes are seen (for which this study is not tailored to assess). CT Pelvis: Bladder: The urinary bladder is within normal limits. Reproductive Organs: Uterus is heterogeneous and may contain a small calcified myometrial masses favored to represent a fibroid. There is an incidentally noted 18 x 12 mm simple cyst in the right ovary. Left ovary is within normal limits. Pelvic Lymph nodes: No pelvic lymphadenopathy or pelvic mass is identified. IMPRESSION: 1. Prominence of the left greater than right lower extremity deep venous vessels from the common iliac to the visualized proximal thigh with mild adjacent fat stranding suspicious for an occult deep venous thrombus and adjacent edema, consider correlation with lower extremity Doppler ultrasound. 2. Fusiform infrarenal abdominal aortic aneurysm measuring 3.4 cm in diameter, there is mild fat stranding along the distal abdominal aorta and iliacs, possibly from underlying deep venous thrombosis, noting that aortic pathology is also a consideration (aortitis, or less likely a leaking aneurysm). 3. 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 4. Mild cardiomegaly and scattered coronary arterial calcifications (3 vessel disease). 5. Large stool ball in the rectal vault, consider correlation for fecal impaction, and/or constipation, with a moderate volume of stool and gas in the remainder the colon. 6. Small nonobstructing left-sided renal stone measuring 2 mm. 7. Sludge in the gallbladder without findings of acute cholecystitis. 8. Numerous additional, and incidental findings as noted above. RESULT NOTIFICATION: These  observations were discussed by the dictating physician, by phone and acknowledged by the ordering provider HOMAR LANGE at 2/13/2022 7:11 PM [facility] Best Practice Recommendations:  Abdominal Aortic Aneurysm FUSIFORM AORTIC ANEURYSM: AAA Size:                          Follow-up Recommendation : 2.6-2.9 cm                        Every 5 years 3.0-3.4 cm                        Every 3 years 3.5-3.9 cm                        Every 2 years 4.0-4.4 cm                        Every 12 months, vascular consultation recommended 4.5-5.4 cm                        Every 6 months, vascular consultation recommended >5.5 cm                             Vascular surgery consultation recommended 1.  These  Best Practice recomendations are based on the ACR white paper: J Am Etienne Radiol 2013;10:789-794 2.  For aortas with maximum diameter of 2.6cm and below,  no followup 3.      Saccular AAA of any size:  Recommend vascular consult. 4.      Enlarging AAA > 0.5cm in 6 mos or >1cm in 1 year:  recommend vascular consult Radiology [facility] Best Practice Guidelines:  Benign-appearing simple adnexal cysts on CT with IV contrast and MR: (1)(2) Pre-menopause(3) (<= 50 years if LMP unknown): <=5 cm: No follow-up imaging recommended >5 cm - <=7 cm:  US f/u 6-12 weeks >7 cm: Consider MR w/IVC or surgical evaluation Early post-menopause (<=5 years from LMP; > 50 years to <= 55 years if LMP unknown): <=3 cm: No follow-up imaging recommended (4) >3 cm - <=5 cm: US f/u 6-12 months (4) >5 cm - <=7 cm: US f/u promptly >7cm: Consider MR w/IVC or surgical evaluation Late post-menopause (>5 years from LMP; > 55 years if LMP unknown): <=3 cm: No follow-up imaging recommended >3 cm - <=7 cm: US f/u promptly >7 cm: Consider MR w/IVC or surgical evaluation Recommendations for Probably benign adnexal cysts on CT and MR:(1)(2) (benign-appearing cysts on non IV-contrast CT or with one or more of the following complicating factors: angulated  margins, not round or oval, poorly visualized such as obscured by artifact or low S/N.) Pre-menopause (<= 50 years if LMP unknown): <=3 cm: No follow-up imaging recommended >3 cm - <=5 cm: US f/u 6-12 weeks >5 cm - <=7 cm: US f/u promptly >7 cm: Consider MR w/IVC or surgical evaluation Early post-menopause (<=5 years from LMP; > 50 years to <= 55 years if LMP unknown): <=3 cm: No follow-up imaging recommended >3 cm - <=7 cm: US f/u promptly >7 cm: Consider MR w/IVC or surgical evaluation Late post-menopause (>5 years from LMP; > 55 years if LMP unknown): <=1 cm: No follow-up imaging recommended >1 cm - <=7 cm: US f/u promptly >7 cm: Consider MR w/IVC or surgical evaluation ____________________________________________________________________________________ (1)Recommendations based on the 2013 ACR White Paper for Managing Incidental Adnexal Findings on Abdominal and Pelvic CT and MRI: J Am Etienne Radiol 2013;10:675-681 (2)Excludes normal/benign findings such as ovarian calcifications w/o associated non-calcified mass, corpus luteum cyst, previously characterized cyst and cyst with documented stability in size and appearance for >2 years (3)Includes cysts with layering hemorrhage (4)If internal hemorrhage suspected in cyst, US f/u 6-12 weeks Electronically signed by:  Kahlil Donahue MD  2/13/2022 7:16 PM CST Workstation: [id number]    NM Lung Scan Perfusion Particulate    Result Date: 2/13/2022  CLINICAL HISTORY: 81 years (1940) Female Pulmonary embolism (PE) suspected, high prob; high prob of pe PT FELL TO FLOOR EARLIER; DOSE 4.9 mCI 99mTC MAA; NO KNOWN LUNG DISEASE; NON SMOKER; ELEVATED CREATINE; CHEST XRAY TODAY TECHNIQUE: NM LUNG PERFUSION. 2 images obtained. Eight standard projections of the lungs were acquired after 4.9 mCi Tc-99m MAA was injected intravenously. No ventilation images were obtained secondary to COVID-19 precautions/protocol. COMPARISON: Radiograph of the chest from the same day. FINDINGS:  Perfusion images demonstrate moderate size wedge-shaped defect in the posterior lateral right lower lobe, and moderate size defect in the anterior right lower lobe suspicious for a pulmonary embolus. No discrete radiographic abnormality is identified in these areas. No ventilation images were obtained. IMPRESSION: Intermediate-to high probability for acute PE. Electronically signed by:  Kahlil Donahue MD  2/13/2022 6:47 PM CST Workstation: [id number]    X-Ray Chest AP Portable    Result Date: 2/13/2022  CLINICAL HISTORY: 81 years (1940) Female sob LOC TECHNIQUE: Portable AP radiograph the chest. COMPARISON: Radiograph from April 13, 2018. FINDINGS: The lungs are clear. Costophrenic angles are seen without effusion. No pneumothorax is identified. The heart is normal in size. The aortic contour is quite prominent, a finding likely indicating either an aortic aneurysm or dissection. There are degenerative changes in the shoulders. The visualized upper abdomen is unremarkable. IMPRESSION: 1. No acute pulmonary process. 2. Prominence of the aortic knob suggestive of an aneurysm or dissection, new/worse when compared to the previous exam, consider dedicated contrast-enhanced CT follow-up. Electronically signed by:  aKhlil Donahue MD  2/13/2022 4:18 PM CST Workstation: [id number]    US Carotid Bilateral    Result Date: 2/14/2022  CMS MANDATED QUALITY DATA - CAROTID - 195 All measurements and percent stenosis described below were determined using NASCET criteria or criteria similar to NASCET, as defined by the Society of Radiologists in Ultrasound Consensus Conference, Radiology, 2003 CLINICAL HISTORY: 81 years (1940) Female syncope TECHNIQUE US CAROTID DOPPLER BILATERAL Duplex sonographic ultrasound of the bilateral carotid arteries. Color and grayscale images were obtained. 59 images obtained. COMPARISON: None available. FINDINGS: For the RIGHT carotid artery, there is diffuse intimal thickening with  scattered calcified plaques in the carotid bulb but no focal stenosis or grayscale. The highest peak systolic velocity noted in the right internal carotid artery is 47 cm/s. The highest peak diastolic velocity noted in the right internal carotid artery is 13 cm/s. The ICA/CCA peak systolic ratio is 0.8. These values suggest no clinically significant degree of stenosis. The right vertebral artery shows antegrade flow. The external carotid artery is patent. For the LEFT carotid artery, there is diffuse intimal thickening with minimal calcified plaque in the proximal internal carotid artery but no focal stenosis by grayscale. The highest peak systolic velocity noted in the left internal carotid artery is 43 cm/s. The highest peak diastolic velocity noted in the left internal carotid artery is 15 cm/s. The ICA/CCA peak systolic ratio is 0.6. These values suggest no clinically significant degree of stenosis. The left vertebral artery shows antegrade flow. The external carotid artery is patent. IMPRESSION: No clinically significant degree of stenosis by velocities or ratios in the carotid arteries and bilateral antegrade vertebral artery flow. Electronically signed by:  Kahlil Donahue MD  2/14/2022 5:52 AM CST Workstation: [id number]    US Lower Extremity Veins Bilateral    Result Date: 2/13/2022   ADDENDUM #1 This report containing critical findings was discussed with  Dr. Dasia GRAF on 2/13/2022 9:43 PM CST. The findings were acknowledged and understood. Electronically signed by:  Emil Lawrence MD  2/13/2022 10:35 PM CST Workstation: [id number]  ORIGINAL REPORT EXAM:  US Duplex Bilateral Lower Extremities Veins CLINICAL HISTORY:  81 years old, Female;    ; TECHNIQUE:  Real-time duplex ultrasound scan of the bilateral lower extremity veins integrating B-mode two-dimensional vascular structure, Doppler spectral analysis, color flow Doppler imaging and compression. COMPARISON:  No relevant prior studies  available. FINDINGS: Right deep veins:  Diffuse thrombus throughout the common femoral, femoral, popliteal, and posterior tibial veins, occlusive in the proximal to mid femoral vein. Right superficial veins:  Thrombus in the greater saphenous vein and the profunda femoral vein. Left deep veins: Occlusive DVT throughout. Left superficial veins:  Occlusive DVT throughout. Soft tissues:  No acute findings. IMPRESSION: Extensive DVT bilaterally, occlusive throughout the left lower extremity and occlusive in the right femoral vein. Electronically signed by:  Emil Lawrence MD  2/13/2022 9:36 PM CST Workstation: [id number]    Echo    Result Date: 2/14/2022  · The left ventricle is normal in size with concentric remodeling and normal systolic function. · The estimated ejection fraction is 60%. · Grade I left ventricular diastolic dysfunction. · Normal right ventricular size with normal right ventricular systolic function. · Mild tricuspid regurgitation. · Normal central venous pressure (3 mmHg). · The estimated PA systolic pressure is 30 mmHg.      CT Chest Abdomen Pelvis Without Contrast (XPD)    Result Date: 2/13/2022  CMS MANDATED QUALITY DATA - CT RADIATION 436 All CT scans at this facility utilize dose modulation, iterative reconstruction, and/or weight based dosing when appropriate to reduce radiation dose to as low as reasonably achievable. CLINICAL HISTORY: 81 years (1940) Female Syncope, recurrent; Mental status change, unknown cause TECHNIQUE: CT HEAD WITHOUT IV CONTRAST, CT CHEST ABDOMEN PELVIS WITHOUT IV CONTRAST, CT CERVICAL SPINE WITHOUT IV CONTRAST. 106 images obtained. Axial CT of the brain without contrast using soft tissue and bone algorithm. . COMPARISON: None available. FINDINGS: There is a small focus of soft tissue swelling over the right occipital parietal bone with no acute intracranial hemorrhage, hydrocephalus, herniation or midline shift and the basal and suprasellar cisterns are patent.  No acute skull fracture is identified. There is a small rounded defect in the left caudate head (image 19), and right basal ganglia (image 21) favored to represent remote lacunar infarcts. Mild periventricular deep cerebral white matter low attenuation, slightly more pronounced in the anterior frontal lobes a nonspecific finding in this age group which can be seen in any diffuse white matter process but which is most commonly associated with chronic microvascular ischemic disease. There are scattered atheromatous calcifications in the intracranial internal carotid arteries. Orbital contents appear within normal limits. External auditory canals are unremarkable. The visualized paranasal sinuses and mastoid air cells are essentially clear. IMPRESSION: 1. No acute intracranial process. 2. Chronic/involutional findings as noted above. CMS MANDATED QUALITY DATA - CT RADIATION  436 All CT scans at this facility utilize dose modulation, iterative reconstruction, and/or weight based dosing when appropriate to reduce radiation dose to as low as reasonably achievable. CLINICAL HISTORY: 81 years (1940) Female Syncope, recurrent; Mental status change, unknown cause TECHNIQUE: CT HEAD WITHOUT IV CONTRAST, CT CHEST ABDOMEN PELVIS WITHOUT IV CONTRAST, CT CERVICAL SPINE WITHOUT IV CONTRAST. 435 images obtained. Contiguous thin section axial images were obtained of the cervical spine. Sagittal and coronal reformatted images were generated. Note that this exam is suboptimal for evaluation of disc disease (better evaluated on MRI) and does not assess for ligamentous injury or stability. COMPARISON: None available. FINDINGS: No acute fracture of the cervical spine. No traumatic malalignment of the cervical spine.  There is reversal of the normal lordotic curvature of the cervical spine. No perched, jumped or locked facets seen. Moderate to severe disc height loss and bilateral facet arthropathy at C3-C4, C4-C5 and C5-C6.  Additional degenerative changes are present (for which this study is not tailored to assess) Visualized brain is unremarkable. Scattered calcified plaques are seen at the level of the carotids bilaterally. IMPRESSION: 1. No acute fracture or traumatic malalignment the cervical spine. 2. Reversal of the normal lordotic curvature, likely secondary to a combination of positioning, degenerative change and/or muscle spasm. CMS MANDATED QUALITY DATA - CT RADIATION  436 All CT scans at this facility utilize dose modulation, iterative reconstruction, and/or weight based dosing when appropriate to reduce radiation dose to as low as reasonably achievable. CLINICAL HISTORY: 81 years (1940) Female Syncope, recurrent; Mental status change, unknown cause TECHNIQUE: CT HEAD WITHOUT IV CONTRAST, CT CHEST ABDOMEN PELVIS WITHOUT IV CONTRAST, CT CERVICAL SPINE WITHOUT IV CONTRAST.  417 images obtained. Axial CT images of the chest, abdomen and pelvis were obtained from the thoracic inlet to the proximal thigh. CONTRAST: No IV contrast was administered COMPARISON: None available. FINDINGS: Evaluation of the solid organs and vasculature is suboptimal without contrast. CT Chest: Visualized neck: The thyroid gland appears heterogeneous without dominant nodule. Lungs: There is dependent atelectasis in the bilateral lower lobes.  The lungs are otherwise clear. Airway: The trachea and central bronchial tree appear normal. Pleura: There is no pleural effusion. There is no pneumothorax. Cardiovascular: Heart is normal in size, there are scattered coronary arterial calcifications (3 vessel disease). There are calcified plaques at the aortic arch, and aneurysmal dilation of the apex of the aortic arch measuring up to 4.6 (coronal image 1:15 cm in diameter (image 25) Mediastinum: There is no supraclavicular, axillary, mediastinal, or hilar lymphadenopathy. Soft tissues: The peripheral soft tissues appear normal. Musculoskeletal: The  visualized osseous structures appear normal.  There are no suspicious osseous lesions. Esophagus: The esophagus is grossly normal.  There is a small hiatal hernia. CT Abdomen: Liver: The liver is normal in size and imaging appearance. Gallbladder: There is sludge in the gallbladder without wall thickening, pericholecystic fluid or finding to suggest acute cholecystitis. Biliary Tree: No intra or extrahepatic ductal dilation. Spleen: Within normal limits. Pancreas: The pancreas is normal. Adrenal Glands: -- Right: The right adrenal gland is normal in appearance, without evidence of nodule or mass. 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 Kidneys: No hydronephrosis, hydroureter or evidence of obstructive uropathy. There is a 2 mm nonobstructing upper pole left renal stone. There are several rounded simple fluid attenuation structures in the right kidney favored to represent simple cysts, and completely assessed on this noncontrasted exam, consider nonemergent outpatient follow-up ultrasound. Vasculature: Scattered calcified plaques are seen in the abdominal aorta. There is aneurysmal dilation of the infrarenal abdominal aorta measuring up to 3.4 x 3.5 cm (image 178) there is mild fat stranding around the distal abdominal aorta and origin iliacs. There is ectasia of the common iliacs measuring 19 mm in diameter on the right, and 15 mm diameter on the left. There is mild prominence of the left greater than right common iliac vein and left lower extremity deep venous vessels suspicious for thrombus, with mild fat stranding. Lymph nodes: No abdominal lymphadenopathy is seen. Intraperitoneal structures: There is no ascites. Bowel: There is a large stool ball seen in the rectal vault (consider correlation for fecal impaction and/or constipation. There is a moderate the appendix is normal (image 156). Volume of stool and gas in  the remainder the colon. Abdominal wall: The abdominal wall and musculature are normal. Musculoskeletal: There is moderate to severe disc height loss at L4-L5 with grade 1 anterolisthesis. There is severe bilateral facet arthropathy at this level. Additional degenerative changes are seen (for which this study is not tailored to assess). CT Pelvis: Bladder: The urinary bladder is within normal limits. Reproductive Organs: Uterus is heterogeneous and may contain a small calcified myometrial masses favored to represent a fibroid. There is an incidentally noted 18 x 12 mm simple cyst in the right ovary. Left ovary is within normal limits. Pelvic Lymph nodes: No pelvic lymphadenopathy or pelvic mass is identified. IMPRESSION: 1. Prominence of the left greater than right lower extremity deep venous vessels from the common iliac to the visualized proximal thigh with mild adjacent fat stranding suspicious for an occult deep venous thrombus and adjacent edema, consider correlation with lower extremity Doppler ultrasound. 2. Fusiform infrarenal abdominal aortic aneurysm measuring 3.4 cm in diameter, there is mild fat stranding along the distal abdominal aorta and iliacs, possibly from underlying deep venous thrombosis, noting that aortic pathology is also a consideration (aortitis, or less likely a leaking aneurysm). 3. 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 4. Mild cardiomegaly and scattered coronary arterial calcifications (3 vessel disease). 5. Large stool ball in the rectal vault, consider correlation for fecal impaction, and/or constipation, with a moderate volume of stool and gas in the remainder the colon. 6. Small nonobstructing left-sided renal stone measuring 2 mm. 7. Sludge in the gallbladder without findings of acute cholecystitis. 8. Numerous additional, and incidental findings as noted above. RESULT NOTIFICATION: These observations were discussed by the dictating  physician, by phone and acknowledged by the ordering provider HOMAR LANGE at 2/13/2022 7:11 PM [facility] Best Practice Recommendations:  Abdominal Aortic Aneurysm FUSIFORM AORTIC ANEURYSM: AAA Size:                          Follow-up Recommendation : 2.6-2.9 cm                        Every 5 years 3.0-3.4 cm                        Every 3 years 3.5-3.9 cm                        Every 2 years 4.0-4.4 cm                        Every 12 months, vascular consultation recommended 4.5-5.4 cm                        Every 6 months, vascular consultation recommended >5.5 cm                             Vascular surgery consultation recommended 1.  These  Best Practice recomendations are based on the ACR white paper: J Am Etienne Radiol 2013;10:789-794 2.  For aortas with maximum diameter of 2.6cm and below,  no followup 3.      Saccular AAA of any size:  Recommend vascular consult. 4.      Enlarging AAA > 0.5cm in 6 mos or >1cm in 1 year:  recommend vascular consult Radiology [facility] Best Practice Guidelines:  Benign-appearing simple adnexal cysts on CT with IV contrast and MR: (1)(2) Pre-menopause(3) (<= 50 years if LMP unknown): <=5 cm: No follow-up imaging recommended >5 cm - <=7 cm:  US f/u 6-12 weeks >7 cm: Consider MR w/IVC or surgical evaluation Early post-menopause (<=5 years from LMP; > 50 years to <= 55 years if LMP unknown): <=3 cm: No follow-up imaging recommended (4) >3 cm - <=5 cm: US f/u 6-12 months (4) >5 cm - <=7 cm: US f/u promptly >7cm: Consider MR w/IVC or surgical evaluation Late post-menopause (>5 years from LMP; > 55 years if LMP unknown): <=3 cm: No follow-up imaging recommended >3 cm - <=7 cm: US f/u promptly >7 cm: Consider MR w/IVC or surgical evaluation Recommendations for Probably benign adnexal cysts on CT and MR:(1)(2) (benign-appearing cysts on non IV-contrast CT or with one or more of the following complicating factors: angulated margins, not round or oval, poorly visualized such  as obscured by artifact or low S/N.) Pre-menopause (<= 50 years if LMP unknown): <=3 cm: No follow-up imaging recommended >3 cm - <=5 cm: US f/u 6-12 weeks >5 cm - <=7 cm: US f/u promptly >7 cm: Consider MR w/IVC or surgical evaluation Early post-menopause (<=5 years from LMP; > 50 years to <= 55 years if LMP unknown): <=3 cm: No follow-up imaging recommended >3 cm - <=7 cm: US f/u promptly >7 cm: Consider MR w/IVC or surgical evaluation Late post-menopause (>5 years from LMP; > 55 years if LMP unknown): <=1 cm: No follow-up imaging recommended >1 cm - <=7 cm: US f/u promptly >7 cm: Consider MR w/IVC or surgical evaluation ____________________________________________________________________________________ (1)Recommendations based on the 2013 ACR White Paper for Managing Incidental Adnexal Findings on Abdominal and Pelvic CT and MRI: J Am Etienne Radiol 2013;10:675-681 (2)Excludes normal/benign findings such as ovarian calcifications w/o associated non-calcified mass, corpus luteum cyst, previously characterized cyst and cyst with documented stability in size and appearance for >2 years (3)Includes cysts with layering hemorrhage (4)If internal hemorrhage suspected in cyst, US f/u 6-12 weeks Electronically signed by:  Kahlil Donahue MD  2/13/2022 7:16 PM CST Workstation: [id number]        Assessment/Plan:      Active Hospital Problems    Diagnosis    *Acute bilateral deep vein thrombosis (DVT) of femoral veins    Positive blood culture    CKD (chronic kidney disease), stage III    Non compliance w medication regimen    Constipation with fecal impaction     Type 2 diabetes mellitus, without long-term current use of insulin    Syncope and collapse    Abdominal aortic aneurysm (AAA) without rupture    Thrombocytopenia    Primary hypertension    Acute pulmonary embolism     Plan:  Continue care on medical floor with remote telemetry monitoring  Continue therapeutic dose Lovenox for anticoagulation  Adjusted  stool softener/suppository and follow-up bowel movement  1/2 blood culture with GPC, unclear if contaminant, will continue vancomycin and follow up culture results  Follow-up complete echocardiogram report  Hold oral hypoglycemics, low-dose insulin sliding scale t.i.d. with meals and Accu-Cheks.  As needed hypoglycemic measures  Renally dosing all medications and avoid nephrotoxic drugs  Continue counseling on need for compliance with medication  A.m. labs ordered  Vascular surgery consulted  Hematology consulted  Further plan as per clinical course    VTE Risk Mitigation (From admission, onward)         Ordered     enoxaparin injection 70 mg  Every 24 hours (non-standard times)         02/13/22 1958              Addendum  RN informed the blood pressure dropped after morning antihypertensives, possibly due to noncompliance with medications at home, will hold all antihypertensives, urine culture positive, continuing vancomycin and Zosyn and following culture results, deescalate as able, monitor blood pressure closely.    Discharge Planning   MANOJ:      Code Status: Full Code   Is the patient medically ready for discharge?:     Reason for patient still in hospital (select all that apply): Patient trending condition                     Janette Juan MD  Department of Hospital Medicine   [facility]

## 2022-02-14 NOTE — SUBJECTIVE & OBJECTIVE
"Interval History:  See hospital course.  Patient reports for about 2 days she had bilateral leg pain with generalized weakness.  She is not able to state if she was more short of breath, reports diffuse body pains.  Does admit to constipation without nausea or vomiting.  States she lives alone.  When asked why she was not compliant with medications states "I don't know." Afebrile with T-max 98.6°.  On room air.  Labs with WBC 11, platelet 125, BUN/creatinine 32/1.7, repeat lactic acid 1.9.  Ultrasound legs extensive bilateral DVT.  Perfusion scan with intermediate to high probability right lung PE.  Discussed with patient.  No visitors at bedside.    Review of Systems   Constitutional: Negative for fever.   Respiratory: Positive for shortness of breath.    Cardiovascular: Negative for chest pain and leg swelling.   Gastrointestinal: Positive for constipation. Negative for nausea and vomiting.   Neurological: Positive for weakness.     Objective:     Vital Signs (Most Recent):  Temp: 98.6 °F (37 °C) (02/14/22 0722)  Pulse: 92 (02/14/22 0722)  Resp: 18 (02/14/22 0722)  BP: (!) 152/98 (nn) (02/14/22 0722)  SpO2: 99 % (02/14/22 0722) Vital Signs (24h Range):  Temp:  [98.1 °F (36.7 °C)-98.6 °F (37 °C)] 98.6 °F (37 °C)  Pulse:  [] 92  Resp:  [16-40] 18  SpO2:  [95 %-99 %] 99 %  BP: (142-158)/() 152/98     Weight: 62.1 kg (136 lb 14.5 oz)  Body mass index is 25.04 kg/m².    Intake/Output Summary (Last 24 hours) at 2/14/2022 1034  Last data filed at 2/14/2022 0442  Gross per 24 hour   Intake 240 ml   Output --   Net 240 ml      Physical Exam  Vitals and nursing note reviewed.   Constitutional:       Comments: Elderly chronically ill appearing female lying in bed, cooperative   HENT:      Head: Normocephalic and atraumatic.      Mouth/Throat:      Mouth: Mucous membranes are moist.      Comments: edentulous  Cardiovascular:      Rate and Rhythm: Regular rhythm. Tachycardia present.      Heart sounds: Murmur " heard.        Comments: No lower extremity edema  Pulmonary:      Effort: No respiratory distress.      Breath sounds: No stridor. No wheezing.      Comments: On room air  Abdominal:      General: Bowel sounds are normal. There is no distension.      Palpations: Abdomen is soft.      Tenderness: There is no abdominal tenderness. There is no guarding.   Skin:     Comments: Dry skin legs   Neurological:      Mental Status: She is alert and oriented to person, place, and time.      Comments: Answering questions, moving all 4 extremities   Psychiatric:         Mood and Affect: Mood normal.         Significant Labs:   Blood Culture:   Recent Labs   Lab 02/13/22  1621 02/13/22  1702   LABBLOO No Growth to date Gram stain aer bottle: Gram positive cocci  Results called to and read back by:Corrie Currie [id number];    02/14/2022  10:02 CJD     BMP:   Recent Labs   Lab 02/14/22  0254   GLU 98      K 3.8      CO2 21*   BUN 32*   CREATININE 1.7*   CALCIUM 9.2   MG 2.1     CBC:   Recent Labs   Lab 02/13/22  1621 02/14/22  0254   WBC 15.78* 11.01   HGB 14.4 13.4   HCT 45.7 44.3    125*     CMP:   Recent Labs   Lab 02/13/22  1621 02/14/22  0254    140   K 3.6 3.8    104   CO2 23 21*   * 98   BUN 30* 32*   CREATININE 2.0* 1.7*   CALCIUM 9.9 9.2   PROT 7.6 7.1   ALBUMIN 3.6 3.2*   BILITOT 1.3* 1.0   ALKPHOS 129 105   AST 16 12   ALT 10 6*   ANIONGAP 16 15   EGFRNONAA 22.6* 27.5*     Cardiac Markers:   Recent Labs   Lab 02/13/22  1621   BNP 38     Lactic Acid:   Recent Labs   Lab 02/13/22  1621 02/14/22  0254   LACTATE 4.3* 1.9  1.9     Magnesium:   Recent Labs   Lab 02/14/22  0254   MG 2.1     POCT Glucose: No results for input(s): POCTGLUCOSE in the last 48 hours.  Troponin:   Recent Labs   Lab 02/13/22  1621   TROPONINI 0.032     TSH: No results for input(s): TSH in the last 4320 hours.  Urine Culture: No results for input(s): LABURIN in the last 48 hours.  Urine Studies:   Recent  Labs   Lab 02/14/22  0935   COLORU Red*   APPEARANCEUA Cloudy*   PHUR 6.0   SPECGRAV 1.025   PROTEINUA SEE COMMENT   GLUCUA Negative   KETONESU Trace*   BILIRUBINUA Negative   OCCULTUA 3+*   NITRITE SEE COMMENT   UROBILINOGEN SEE COMMENT   LEUKOCYTESUR Trace*   RBCUA >100*   WBCUA 30*   BACTERIA Few*   SQUAMEPITHEL 15   HYALINECASTS 23*       Significant Imaging: I have reviewed and interpreted all pertinent imaging results/findings within the past 24 hours.     CT Head Without Contrast    Result Date: 2/13/2022  CMS MANDATED QUALITY DATA - CT RADIATION 436 All CT scans at this facility utilize dose modulation, iterative reconstruction, and/or weight based dosing when appropriate to reduce radiation dose to as low as reasonably achievable. CLINICAL HISTORY: 81 years (1940) Female Syncope, recurrent; Mental status change, unknown cause TECHNIQUE: CT HEAD WITHOUT IV CONTRAST, CT CHEST ABDOMEN PELVIS WITHOUT IV CONTRAST, CT CERVICAL SPINE WITHOUT IV CONTRAST. 106 images obtained. Axial CT of the brain without contrast using soft tissue and bone algorithm. . COMPARISON: None available. FINDINGS: There is a small focus of soft tissue swelling over the right occipital parietal bone with no acute intracranial hemorrhage, hydrocephalus, herniation or midline shift and the basal and suprasellar cisterns are patent. No acute skull fracture is identified. There is a small rounded defect in the left caudate head (image 19), and right basal ganglia (image 21) favored to represent remote lacunar infarcts. Mild periventricular deep cerebral white matter low attenuation, slightly more pronounced in the anterior frontal lobes a nonspecific finding in this age group which can be seen in any diffuse white matter process but which is most commonly associated with chronic microvascular ischemic disease. There are scattered atheromatous calcifications in the intracranial internal carotid arteries. Orbital contents appear within  normal limits. External auditory canals are unremarkable. The visualized paranasal sinuses and mastoid air cells are essentially clear. IMPRESSION: 1. No acute intracranial process. 2. Chronic/involutional findings as noted above. CMS MANDATED QUALITY DATA - CT RADIATION  436 All CT scans at this facility utilize dose modulation, iterative reconstruction, and/or weight based dosing when appropriate to reduce radiation dose to as low as reasonably achievable. CLINICAL HISTORY: 81 years (1940) Female Syncope, recurrent; Mental status change, unknown cause TECHNIQUE: CT HEAD WITHOUT IV CONTRAST, CT CHEST ABDOMEN PELVIS WITHOUT IV CONTRAST, CT CERVICAL SPINE WITHOUT IV CONTRAST. 435 images obtained. Contiguous thin section axial images were obtained of the cervical spine. Sagittal and coronal reformatted images were generated. Note that this exam is suboptimal for evaluation of disc disease (better evaluated on MRI) and does not assess for ligamentous injury or stability. COMPARISON: None available. FINDINGS: No acute fracture of the cervical spine. No traumatic malalignment of the cervical spine.  There is reversal of the normal lordotic curvature of the cervical spine. No perched, jumped or locked facets seen. Moderate to severe disc height loss and bilateral facet arthropathy at C3-C4, C4-C5 and C5-C6. Additional degenerative changes are present (for which this study is not tailored to assess) Visualized brain is unremarkable. Scattered calcified plaques are seen at the level of the carotids bilaterally. IMPRESSION: 1. No acute fracture or traumatic malalignment the cervical spine. 2. Reversal of the normal lordotic curvature, likely secondary to a combination of positioning, degenerative change and/or muscle spasm. CMS MANDATED QUALITY DATA - CT RADIATION  436 All CT scans at this facility utilize dose modulation, iterative reconstruction, and/or weight based dosing when appropriate to reduce radiation dose to  as low as reasonably achievable. CLINICAL HISTORY: 81 years (1940) Female Syncope, recurrent; Mental status change, unknown cause TECHNIQUE: CT HEAD WITHOUT IV CONTRAST, CT CHEST ABDOMEN PELVIS WITHOUT IV CONTRAST, CT CERVICAL SPINE WITHOUT IV CONTRAST.  417 images obtained. Axial CT images of the chest, abdomen and pelvis were obtained from the thoracic inlet to the proximal thigh. CONTRAST: No IV contrast was administered COMPARISON: None available. FINDINGS: Evaluation of the solid organs and vasculature is suboptimal without contrast. CT Chest: Visualized neck: The thyroid gland appears heterogeneous without dominant nodule. Lungs: There is dependent atelectasis in the bilateral lower lobes.  The lungs are otherwise clear. Airway: The trachea and central bronchial tree appear normal. Pleura: There is no pleural effusion. There is no pneumothorax. Cardiovascular: Heart is normal in size, there are scattered coronary arterial calcifications (3 vessel disease). There are calcified plaques at the aortic arch, and aneurysmal dilation of the apex of the aortic arch measuring up to 4.6 (coronal image 1:15 cm in diameter (image 25) Mediastinum: There is no supraclavicular, axillary, mediastinal, or hilar lymphadenopathy. Soft tissues: The peripheral soft tissues appear normal. Musculoskeletal: The visualized osseous structures appear normal.  There are no suspicious osseous lesions. Esophagus: The esophagus is grossly normal.  There is a small hiatal hernia. CT Abdomen: Liver: The liver is normal in size and imaging appearance. Gallbladder: There is sludge in the gallbladder without wall thickening, pericholecystic fluid or finding to suggest acute cholecystitis. Biliary Tree: No intra or extrahepatic ductal dilation. Spleen: Within normal limits. Pancreas: The pancreas is normal. Adrenal Glands: -- Right: The right adrenal gland is normal in appearance, without evidence of nodule or mass. 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 Kidneys: No hydronephrosis, hydroureter or evidence of obstructive uropathy. There is a 2 mm nonobstructing upper pole left renal stone. There are several rounded simple fluid attenuation structures in the right kidney favored to represent simple cysts, and completely assessed on this noncontrasted exam, consider nonemergent outpatient follow-up ultrasound. Vasculature: Scattered calcified plaques are seen in the abdominal aorta. There is aneurysmal dilation of the infrarenal abdominal aorta measuring up to 3.4 x 3.5 cm (image 178) there is mild fat stranding around the distal abdominal aorta and origin iliacs. There is ectasia of the common iliacs measuring 19 mm in diameter on the right, and 15 mm diameter on the left. There is mild prominence of the left greater than right common iliac vein and left lower extremity deep venous vessels suspicious for thrombus, with mild fat stranding. Lymph nodes: No abdominal lymphadenopathy is seen. Intraperitoneal structures: There is no ascites. Bowel: There is a large stool ball seen in the rectal vault (consider correlation for fecal impaction and/or constipation. There is a moderate the appendix is normal (image 156). Volume of stool and gas in the remainder the colon. Abdominal wall: The abdominal wall and musculature are normal. Musculoskeletal: There is moderate to severe disc height loss at L4-L5 with grade 1 anterolisthesis. There is severe bilateral facet arthropathy at this level. Additional degenerative changes are seen (for which this study is not tailored to assess). CT Pelvis: Bladder: The urinary bladder is within normal limits. Reproductive Organs: Uterus is heterogeneous and may contain a small calcified myometrial masses favored to represent a fibroid. There is an incidentally noted 18 x 12 mm simple cyst in the right ovary. Left ovary is  within normal limits. Pelvic Lymph nodes: No pelvic lymphadenopathy or pelvic mass is identified. IMPRESSION: 1. Prominence of the left greater than right lower extremity deep venous vessels from the common iliac to the visualized proximal thigh with mild adjacent fat stranding suspicious for an occult deep venous thrombus and adjacent edema, consider correlation with lower extremity Doppler ultrasound. 2. Fusiform infrarenal abdominal aortic aneurysm measuring 3.4 cm in diameter, there is mild fat stranding along the distal abdominal aorta and iliacs, possibly from underlying deep venous thrombosis, noting that aortic pathology is also a consideration (aortitis, or less likely a leaking aneurysm). 3. 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 4. Mild cardiomegaly and scattered coronary arterial calcifications (3 vessel disease). 5. Large stool ball in the rectal vault, consider correlation for fecal impaction, and/or constipation, with a moderate volume of stool and gas in the remainder the colon. 6. Small nonobstructing left-sided renal stone measuring 2 mm. 7. Sludge in the gallbladder without findings of acute cholecystitis. 8. Numerous additional, and incidental findings as noted above. RESULT NOTIFICATION: These observations were discussed by the dictating physician, by phone and acknowledged by the ordering provider HOMAR LANGE at 2/13/2022 7:11 PM CST [facility] Best Practice Recommendations:  Abdominal Aortic Aneurysm FUSIFORM AORTIC ANEURYSM: AAA Size:                          Follow-up Recommendation : 2.6-2.9 cm                        Every 5 years 3.0-3.4 cm                        Every 3 years 3.5-3.9 cm                        Every 2 years 4.0-4.4 cm                        Every 12 months, vascular consultation recommended 4.5-5.4 cm                        Every 6 months, vascular consultation recommended >5.5 cm                             Vascular  surgery consultation recommended 1.  These RP Best Practice recomendations are based on the ACR white paper: J Am Etienne Radiol 2013;10:789-794 2.  For aortas with maximum diameter of 2.6cm and below,  no followup 3.      Saccular AAA of any size:  Recommend vascular consult. 4.      Enlarging AAA > 0.5cm in 6 mos or >1cm in 1 year:  recommend vascular consult [facility] Best Practice Guidelines:  Benign-appearing simple adnexal cysts on CT with IV contrast and MR: (1)(2) Pre-menopause(3) (<= 50 years if LMP unknown): <=5 cm: No follow-up imaging recommended >5 cm - <=7 cm:  US f/u 6-12 weeks >7 cm: Consider MR w/IVC or surgical evaluation Early post-menopause (<=5 years from LMP; > 50 years to <= 55 years if LMP unknown): <=3 cm: No follow-up imaging recommended (4) >3 cm - <=5 cm: US f/u 6-12 months (4) >5 cm - <=7 cm: US f/u promptly >7cm: Consider MR w/IVC or surgical evaluation Late post-menopause (>5 years from LMP; > 55 years if LMP unknown): <=3 cm: No follow-up imaging recommended >3 cm - <=7 cm: US f/u promptly >7 cm: Consider MR w/IVC or surgical evaluation Recommendations for Probably benign adnexal cysts on CT and MR:(1)(2) (benign-appearing cysts on non IV-contrast CT or with one or more of the following complicating factors: angulated margins, not round or oval, poorly visualized such as obscured by artifact or low S/N.) Pre-menopause (<= 50 years if LMP unknown): <=3 cm: No follow-up imaging recommended >3 cm - <=5 cm: US f/u 6-12 weeks >5 cm - <=7 cm: US f/u promptly >7 cm: Consider MR w/IVC or surgical evaluation Early post-menopause (<=5 years from LMP; > 50 years to <= 55 years if LMP unknown): <=3 cm: No follow-up imaging recommended >3 cm - <=7 cm: US f/u promptly >7 cm: Consider MR w/IVC or surgical evaluation Late post-menopause (>5 years from LMP; > 55 years if LMP unknown): <=1 cm: No follow-up imaging recommended >1 cm - <=7 cm: US f/u promptly >7 cm: Consider MR w/IVC or surgical  evaluation ____________________________________________________________________________________ (1)Recommendations based on the 2013 ACR White Paper for Managing Incidental Adnexal Findings on Abdominal and Pelvic CT and MRI: J Am Etienne Radiol 2013;10:675-681 (2)Excludes normal/benign findings such as ovarian calcifications w/o associated non-calcified mass, corpus luteum cyst, previously characterized cyst and cyst with documented stability in size and appearance for >2 years (3)Includes cysts with layering hemorrhage (4)If internal hemorrhage suspected in cyst, US f/u 6-12 weeks Electronically signed by:  Kahlil Donahue MD  2/13/2022 7:16 PM CST Workstation: [id number]    CT Cervical Spine Without Contrast    Result Date: 2/13/2022  CMS MANDATED QUALITY DATA - CT RADIATION 436 All CT scans at this facility utilize dose modulation, iterative reconstruction, and/or weight based dosing when appropriate to reduce radiation dose to as low as reasonably achievable. CLINICAL HISTORY: 81 years (1940) Female Syncope, recurrent; Mental status change, unknown cause TECHNIQUE: CT HEAD WITHOUT IV CONTRAST, CT CHEST ABDOMEN PELVIS WITHOUT IV CONTRAST, CT CERVICAL SPINE WITHOUT IV CONTRAST. 106 images obtained. Axial CT of the brain without contrast using soft tissue and bone algorithm. . COMPARISON: None available. FINDINGS: There is a small focus of soft tissue swelling over the right occipital parietal bone with no acute intracranial hemorrhage, hydrocephalus, herniation or midline shift and the basal and suprasellar cisterns are patent. No acute skull fracture is identified. There is a small rounded defect in the left caudate head (image 19), and right basal ganglia (image 21) favored to represent remote lacunar infarcts. Mild periventricular deep cerebral white matter low attenuation, slightly more pronounced in the anterior frontal lobes a nonspecific finding in this age group which can be seen in any diffuse white  matter process but which is most commonly associated with chronic microvascular ischemic disease. There are scattered atheromatous calcifications in the intracranial internal carotid arteries. Orbital contents appear within normal limits. External auditory canals are unremarkable. The visualized paranasal sinuses and mastoid air cells are essentially clear. IMPRESSION: 1. No acute intracranial process. 2. Chronic/involutional findings as noted above. CMS MANDATED QUALITY DATA - CT RADIATION  436 All CT scans at this facility utilize dose modulation, iterative reconstruction, and/or weight based dosing when appropriate to reduce radiation dose to as low as reasonably achievable. CLINICAL HISTORY: 81 years (1940) Female Syncope, recurrent; Mental status change, unknown cause TECHNIQUE: CT HEAD WITHOUT IV CONTRAST, CT CHEST ABDOMEN PELVIS WITHOUT IV CONTRAST, CT CERVICAL SPINE WITHOUT IV CONTRAST. 435 images obtained. Contiguous thin section axial images were obtained of the cervical spine. Sagittal and coronal reformatted images were generated. Note that this exam is suboptimal for evaluation of disc disease (better evaluated on MRI) and does not assess for ligamentous injury or stability. COMPARISON: None available. FINDINGS: No acute fracture of the cervical spine. No traumatic malalignment of the cervical spine.  There is reversal of the normal lordotic curvature of the cervical spine. No perched, jumped or locked facets seen. Moderate to severe disc height loss and bilateral facet arthropathy at C3-C4, C4-C5 and C5-C6. Additional degenerative changes are present (for which this study is not tailored to assess) Visualized brain is unremarkable. Scattered calcified plaques are seen at the level of the carotids bilaterally. IMPRESSION: 1. No acute fracture or traumatic malalignment the cervical spine. 2. Reversal of the normal lordotic curvature, likely secondary to a combination of positioning, degenerative  change and/or muscle spasm. CMS MANDATED QUALITY DATA - CT RADIATION  436 All CT scans at this facility utilize dose modulation, iterative reconstruction, and/or weight based dosing when appropriate to reduce radiation dose to as low as reasonably achievable. CLINICAL HISTORY: 81 years (1940) Female Syncope, recurrent; Mental status change, unknown cause TECHNIQUE: CT HEAD WITHOUT IV CONTRAST, CT CHEST ABDOMEN PELVIS WITHOUT IV CONTRAST, CT CERVICAL SPINE WITHOUT IV CONTRAST.  417 images obtained. Axial CT images of the chest, abdomen and pelvis were obtained from the thoracic inlet to the proximal thigh. CONTRAST: No IV contrast was administered COMPARISON: None available. FINDINGS: Evaluation of the solid organs and vasculature is suboptimal without contrast. CT Chest: Visualized neck: The thyroid gland appears heterogeneous without dominant nodule. Lungs: There is dependent atelectasis in the bilateral lower lobes.  The lungs are otherwise clear. Airway: The trachea and central bronchial tree appear normal. Pleura: There is no pleural effusion. There is no pneumothorax. Cardiovascular: Heart is normal in size, there are scattered coronary arterial calcifications (3 vessel disease). There are calcified plaques at the aortic arch, and aneurysmal dilation of the apex of the aortic arch measuring up to 4.6 (coronal image 1:15 cm in diameter (image 25) Mediastinum: There is no supraclavicular, axillary, mediastinal, or hilar lymphadenopathy. Soft tissues: The peripheral soft tissues appear normal. Musculoskeletal: The visualized osseous structures appear normal.  There are no suspicious osseous lesions. Esophagus: The esophagus is grossly normal.  There is a small hiatal hernia. CT Abdomen: Liver: The liver is normal in size and imaging appearance. Gallbladder: There is sludge in the gallbladder without wall thickening, pericholecystic fluid or finding to suggest acute cholecystitis. Biliary Tree: No intra or  extrahepatic ductal dilation. Spleen: Within normal limits. Pancreas: The pancreas is normal. Adrenal Glands: -- Right: The right adrenal gland is normal in appearance, without evidence of nodule or mass. 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 Kidneys: No hydronephrosis, hydroureter or evidence of obstructive uropathy. There is a 2 mm nonobstructing upper pole left renal stone. There are several rounded simple fluid attenuation structures in the right kidney favored to represent simple cysts, and completely assessed on this noncontrasted exam, consider nonemergent outpatient follow-up ultrasound. Vasculature: Scattered calcified plaques are seen in the abdominal aorta. There is aneurysmal dilation of the infrarenal abdominal aorta measuring up to 3.4 x 3.5 cm (image 178) there is mild fat stranding around the distal abdominal aorta and origin iliacs. There is ectasia of the common iliacs measuring 19 mm in diameter on the right, and 15 mm diameter on the left. There is mild prominence of the left greater than right common iliac vein and left lower extremity deep venous vessels suspicious for thrombus, with mild fat stranding. Lymph nodes: No abdominal lymphadenopathy is seen. Intraperitoneal structures: There is no ascites. Bowel: There is a large stool ball seen in the rectal vault (consider correlation for fecal impaction and/or constipation. There is a moderate the appendix is normal (image 156). Volume of stool and gas in the remainder the colon. Abdominal wall: The abdominal wall and musculature are normal. Musculoskeletal: There is moderate to severe disc height loss at L4-L5 with grade 1 anterolisthesis. There is severe bilateral facet arthropathy at this level. Additional degenerative changes are seen (for which this study is not tailored to assess). CT Pelvis: Bladder: The urinary bladder is within normal  limits. Reproductive Organs: Uterus is heterogeneous and may contain a small calcified myometrial masses favored to represent a fibroid. There is an incidentally noted 18 x 12 mm simple cyst in the right ovary. Left ovary is within normal limits. Pelvic Lymph nodes: No pelvic lymphadenopathy or pelvic mass is identified. IMPRESSION: 1. Prominence of the left greater than right lower extremity deep venous vessels from the common iliac to the visualized proximal thigh with mild adjacent fat stranding suspicious for an occult deep venous thrombus and adjacent edema, consider correlation with lower extremity Doppler ultrasound. 2. Fusiform infrarenal abdominal aortic aneurysm measuring 3.4 cm in diameter, there is mild fat stranding along the distal abdominal aorta and iliacs, possibly from underlying deep venous thrombosis, noting that aortic pathology is also a consideration (aortitis, or less likely a leaking aneurysm). 3. 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 4. Mild cardiomegaly and scattered coronary arterial calcifications (3 vessel disease). 5. Large stool ball in the rectal vault, consider correlation for fecal impaction, and/or constipation, with a moderate volume of stool and gas in the remainder the colon. 6. Small nonobstructing left-sided renal stone measuring 2 mm. 7. Sludge in the gallbladder without findings of acute cholecystitis. 8. Numerous additional, and incidental findings as noted above. RESULT NOTIFICATION: These observations were discussed by the dictating physician, by phone and acknowledged by the ordering provider HOMAR LANGE at 2/13/2022 7:11 PM [facility] Best Practice Recommendations:  Abdominal Aortic Aneurysm FUSIFORM AORTIC ANEURYSM: AAA Size:                          Follow-up Recommendation : 2.6-2.9 cm                        Every 5 years 3.0-3.4 cm                        Every 3 years 3.5-3.9 cm                         Every 2 years 4.0-4.4 cm                        Every 12 months, vascular consultation recommended 4.5-5.4 cm                        Every 6 months, vascular consultation recommended >5.5 cm                             Vascular surgery consultation recommended 1.  These RP Best Practice recomendations are based on the ACR white paper: J Am Etienne Radiol 2013;10:789-794 2.  For aortas with maximum diameter of 2.6cm and below,  no followup 3.      Saccular AAA of any size:  Recommend vascular consult. 4.      Enlarging AAA > 0.5cm in 6 mos or >1cm in 1 year:  recommend vascular consult [facility] Best Practice Guidelines:  Benign-appearing simple adnexal cysts on CT with IV contrast and MR: (1)(2) Pre-menopause(3) (<= 50 years if LMP unknown): <=5 cm: No follow-up imaging recommended >5 cm - <=7 cm:  US f/u 6-12 weeks >7 cm: Consider MR w/IVC or surgical evaluation Early post-menopause (<=5 years from LMP; > 50 years to <= 55 years if LMP unknown): <=3 cm: No follow-up imaging recommended (4) >3 cm - <=5 cm: US f/u 6-12 months (4) >5 cm - <=7 cm: US f/u promptly >7cm: Consider MR w/IVC or surgical evaluation Late post-menopause (>5 years from LMP; > 55 years if LMP unknown): <=3 cm: No follow-up imaging recommended >3 cm - <=7 cm: US f/u promptly >7 cm: Consider MR w/IVC or surgical evaluation Recommendations for Probably benign adnexal cysts on CT and MR:(1)(2) (benign-appearing cysts on non IV-contrast CT or with one or more of the following complicating factors: angulated margins, not round or oval, poorly visualized such as obscured by artifact or low S/N.) Pre-menopause (<= 50 years if LMP unknown): <=3 cm: No follow-up imaging recommended >3 cm - <=5 cm: US f/u 6-12 weeks >5 cm - <=7 cm: US f/u promptly >7 cm: Consider MR w/IVC or surgical evaluation Early post-menopause (<=5 years from LMP; > 50 years to <= 55 years if LMP unknown): <=3 cm: No follow-up imaging recommended >3 cm - <=7 cm: US f/u promptly  >7 cm: Consider MR w/IVC or surgical evaluation Late post-menopause (>5 years from LMP; > 55 years if LMP unknown): <=1 cm: No follow-up imaging recommended >1 cm - <=7 cm: US f/u promptly >7 cm: Consider MR w/IVC or surgical evaluation ____________________________________________________________________________________ (1)Recommendations based on the 2013 ACR White Paper for Managing Incidental Adnexal Findings on Abdominal and Pelvic CT and MRI: J Am Etienne Radiol 2013;10:675-681 (2)Excludes normal/benign findings such as ovarian calcifications w/o associated non-calcified mass, corpus luteum cyst, previously characterized cyst and cyst with documented stability in size and appearance for >2 years (3)Includes cysts with layering hemorrhage (4)If internal hemorrhage suspected in cyst, US f/u 6-12 weeks Electronically signed by:  Kahlil Donahue MD  2/13/2022 7:16 PM CST Workstation: [id number]    NM Lung Scan Perfusion Particulate    Result Date: 2/13/2022  CLINICAL HISTORY: 81 years (1940) Female Pulmonary embolism (PE) suspected, high prob; high prob of pe PT FELL TO FLOOR EARLIER; DOSE 4.9 mCI 99mTC MAA; NO KNOWN LUNG DISEASE; NON SMOKER; ELEVATED CREATINE; CHEST XRAY TODAY TECHNIQUE: NM LUNG PERFUSION. 2 images obtained. Eight standard projections of the lungs were acquired after 4.9 mCi Tc-99m MAA was injected intravenously. No ventilation images were obtained secondary to COVID-19 precautions/protocol. COMPARISON: Radiograph of the chest from the same day. FINDINGS: Perfusion images demonstrate moderate size wedge-shaped defect in the posterior lateral right lower lobe, and moderate size defect in the anterior right lower lobe suspicious for a pulmonary embolus. No discrete radiographic abnormality is identified in these areas. No ventilation images were obtained. IMPRESSION: Intermediate-to high probability for acute PE. Electronically signed by:  Kahlil Donahue MD  2/13/2022 6:47 PM CST Workstation:  [id number]    X-Ray Chest AP Portable    Result Date: 2/13/2022  CLINICAL HISTORY: 81 years (1940) Female sob LOC TECHNIQUE: Portable AP radiograph the chest. COMPARISON: Radiograph from April 13, 2018. FINDINGS: The lungs are clear. Costophrenic angles are seen without effusion. No pneumothorax is identified. The heart is normal in size. The aortic contour is quite prominent, a finding likely indicating either an aortic aneurysm or dissection. There are degenerative changes in the shoulders. The visualized upper abdomen is unremarkable. IMPRESSION: 1. No acute pulmonary process. 2. Prominence of the aortic knob suggestive of an aneurysm or dissection, new/worse when compared to the previous exam, consider dedicated contrast-enhanced CT follow-up. Electronically signed by:  Kahlil Donahue MD  2/13/2022 4:18 PM CST Workstation: [id number]    US Carotid Bilateral    Result Date: 2/14/2022  CMS MANDATED QUALITY DATA - CAROTID - 195 All measurements and percent stenosis described below were determined using NASCET criteria or criteria similar to NASCET, as defined by the Society of Radiologists in Ultrasound Consensus Conference, Radiology, 2003 CLINICAL HISTORY: 81 years (1940) Female syncope TECHNIQUE US CAROTID DOPPLER BILATERAL Duplex sonographic ultrasound of the bilateral carotid arteries. Color and grayscale images were obtained. 59 images obtained. COMPARISON: None available. FINDINGS: For the RIGHT carotid artery, there is diffuse intimal thickening with scattered calcified plaques in the carotid bulb but no focal stenosis or grayscale. The highest peak systolic velocity noted in the right internal carotid artery is 47 cm/s. The highest peak diastolic velocity noted in the right internal carotid artery is 13 cm/s. The ICA/CCA peak systolic ratio is 0.8. These values suggest no clinically significant degree of stenosis. The right vertebral artery shows antegrade flow. The external carotid artery is  patent. For the LEFT carotid artery, there is diffuse intimal thickening with minimal calcified plaque in the proximal internal carotid artery but no focal stenosis by grayscale. The highest peak systolic velocity noted in the left internal carotid artery is 43 cm/s. The highest peak diastolic velocity noted in the left internal carotid artery is 15 cm/s. The ICA/CCA peak systolic ratio is 0.6. These values suggest no clinically significant degree of stenosis. The left vertebral artery shows antegrade flow. The external carotid artery is patent. IMPRESSION: No clinically significant degree of stenosis by velocities or ratios in the carotid arteries and bilateral antegrade vertebral artery flow. Electronically signed by:  Kahlil Donahue MD  2/14/2022 5:52 AM CST Workstation: [id number]    US Lower Extremity Veins Bilateral    Result Date: 2/13/2022   ADDENDUM #1 This report containing critical findings was discussed with  Dr. Dasia RGAF on 2/13/2022 9:43 PM CST. The findings were acknowledged and understood. Electronically signed by:  Emil Lawrence MD  2/13/2022 10:35 PM CST Workstation: [id number]  ORIGINAL REPORT EXAM:  US Duplex Bilateral Lower Extremities Veins CLINICAL HISTORY:  81 years old, Female;    ; TECHNIQUE:  Real-time duplex ultrasound scan of the bilateral lower extremity veins integrating B-mode two-dimensional vascular structure, Doppler spectral analysis, color flow Doppler imaging and compression. COMPARISON:  No relevant prior studies available. FINDINGS: Right deep veins:  Diffuse thrombus throughout the common femoral, femoral, popliteal, and posterior tibial veins, occlusive in the proximal to mid femoral vein. Right superficial veins:  Thrombus in the greater saphenous vein and the profunda femoral vein. Left deep veins: Occlusive DVT throughout. Left superficial veins:  Occlusive DVT throughout. Soft tissues:  No acute findings. IMPRESSION: Extensive DVT bilaterally,  occlusive throughout the left lower extremity and occlusive in the right femoral vein. Electronically signed by:  Emil Lawrence MD  2/13/2022 9:36 PM CST Workstation: [id number]    Echo    Result Date: 2/14/2022  · The left ventricle is normal in size with concentric remodeling and normal systolic function. · The estimated ejection fraction is 60%. · Grade I left ventricular diastolic dysfunction. · Normal right ventricular size with normal right ventricular systolic function. · Mild tricuspid regurgitation. · Normal central venous pressure (3 mmHg). · The estimated PA systolic pressure is 30 mmHg.      CT Chest Abdomen Pelvis Without Contrast (XPD)    Result Date: 2/13/2022  CMS MANDATED QUALITY DATA - CT RADIATION 436 All CT scans at this facility utilize dose modulation, iterative reconstruction, and/or weight based dosing when appropriate to reduce radiation dose to as low as reasonably achievable. CLINICAL HISTORY: 81 years (1940) Female Syncope, recurrent; Mental status change, unknown cause TECHNIQUE: CT HEAD WITHOUT IV CONTRAST, CT CHEST ABDOMEN PELVIS WITHOUT IV CONTRAST, CT CERVICAL SPINE WITHOUT IV CONTRAST. 106 images obtained. Axial CT of the brain without contrast using soft tissue and bone algorithm. . COMPARISON: None available. FINDINGS: There is a small focus of soft tissue swelling over the right occipital parietal bone with no acute intracranial hemorrhage, hydrocephalus, herniation or midline shift and the basal and suprasellar cisterns are patent. No acute skull fracture is identified. There is a small rounded defect in the left caudate head (image 19), and right basal ganglia (image 21) favored to represent remote lacunar infarcts. Mild periventricular deep cerebral white matter low attenuation, slightly more pronounced in the anterior frontal lobes a nonspecific finding in this age group which can be seen in any diffuse white matter process but which is most commonly associated with  chronic microvascular ischemic disease. There are scattered atheromatous calcifications in the intracranial internal carotid arteries. Orbital contents appear within normal limits. External auditory canals are unremarkable. The visualized paranasal sinuses and mastoid air cells are essentially clear. IMPRESSION: 1. No acute intracranial process. 2. Chronic/involutional findings as noted above. CMS MANDATED QUALITY DATA - CT RADIATION  436 All CT scans at this facility utilize dose modulation, iterative reconstruction, and/or weight based dosing when appropriate to reduce radiation dose to as low as reasonably achievable. CLINICAL HISTORY: 81 years (1940) Female Syncope, recurrent; Mental status change, unknown cause TECHNIQUE: CT HEAD WITHOUT IV CONTRAST, CT CHEST ABDOMEN PELVIS WITHOUT IV CONTRAST, CT CERVICAL SPINE WITHOUT IV CONTRAST. 435 images obtained. Contiguous thin section axial images were obtained of the cervical spine. Sagittal and coronal reformatted images were generated. Note that this exam is suboptimal for evaluation of disc disease (better evaluated on MRI) and does not assess for ligamentous injury or stability. COMPARISON: None available. FINDINGS: No acute fracture of the cervical spine. No traumatic malalignment of the cervical spine.  There is reversal of the normal lordotic curvature of the cervical spine. No perched, jumped or locked facets seen. Moderate to severe disc height loss and bilateral facet arthropathy at C3-C4, C4-C5 and C5-C6. Additional degenerative changes are present (for which this study is not tailored to assess) Visualized brain is unremarkable. Scattered calcified plaques are seen at the level of the carotids bilaterally. IMPRESSION: 1. No acute fracture or traumatic malalignment the cervical spine. 2. Reversal of the normal lordotic curvature, likely secondary to a combination of positioning, degenerative change and/or muscle spasm. CMS MANDATED QUALITY DATA - CT  RADIATION  436 All CT scans at this facility utilize dose modulation, iterative reconstruction, and/or weight based dosing when appropriate to reduce radiation dose to as low as reasonably achievable. CLINICAL HISTORY: 81 years (1940) Female Syncope, recurrent; Mental status change, unknown cause TECHNIQUE: CT HEAD WITHOUT IV CONTRAST, CT CHEST ABDOMEN PELVIS WITHOUT IV CONTRAST, CT CERVICAL SPINE WITHOUT IV CONTRAST.  417 images obtained. Axial CT images of the chest, abdomen and pelvis were obtained from the thoracic inlet to the proximal thigh. CONTRAST: No IV contrast was administered COMPARISON: None available. FINDINGS: Evaluation of the solid organs and vasculature is suboptimal without contrast. CT Chest: Visualized neck: The thyroid gland appears heterogeneous without dominant nodule. Lungs: There is dependent atelectasis in the bilateral lower lobes.  The lungs are otherwise clear. Airway: The trachea and central bronchial tree appear normal. Pleura: There is no pleural effusion. There is no pneumothorax. Cardiovascular: Heart is normal in size, there are scattered coronary arterial calcifications (3 vessel disease). There are calcified plaques at the aortic arch, and aneurysmal dilation of the apex of the aortic arch measuring up to 4.6 (coronal image 1:15 cm in diameter (image 25) Mediastinum: There is no supraclavicular, axillary, mediastinal, or hilar lymphadenopathy. Soft tissues: The peripheral soft tissues appear normal. Musculoskeletal: The visualized osseous structures appear normal.  There are no suspicious osseous lesions. Esophagus: The esophagus is grossly normal.  There is a small hiatal hernia. CT Abdomen: Liver: The liver is normal in size and imaging appearance. Gallbladder: There is sludge in the gallbladder without wall thickening, pericholecystic fluid or finding to suggest acute cholecystitis. Biliary Tree: No intra or extrahepatic ductal dilation. Spleen: Within normal limits.  Pancreas: The pancreas is normal. Adrenal Glands: -- Right: The right adrenal gland is normal in appearance, without evidence of nodule or mass. 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 Kidneys: No hydronephrosis, hydroureter or evidence of obstructive uropathy. There is a 2 mm nonobstructing upper pole left renal stone. There are several rounded simple fluid attenuation structures in the right kidney favored to represent simple cysts, and completely assessed on this noncontrasted exam, consider nonemergent outpatient follow-up ultrasound. Vasculature: Scattered calcified plaques are seen in the abdominal aorta. There is aneurysmal dilation of the infrarenal abdominal aorta measuring up to 3.4 x 3.5 cm (image 178) there is mild fat stranding around the distal abdominal aorta and origin iliacs. There is ectasia of the common iliacs measuring 19 mm in diameter on the right, and 15 mm diameter on the left. There is mild prominence of the left greater than right common iliac vein and left lower extremity deep venous vessels suspicious for thrombus, with mild fat stranding. Lymph nodes: No abdominal lymphadenopathy is seen. Intraperitoneal structures: There is no ascites. Bowel: There is a large stool ball seen in the rectal vault (consider correlation for fecal impaction and/or constipation. There is a moderate the appendix is normal (image 156). Volume of stool and gas in the remainder the colon. Abdominal wall: The abdominal wall and musculature are normal. Musculoskeletal: There is moderate to severe disc height loss at L4-L5 with grade 1 anterolisthesis. There is severe bilateral facet arthropathy at this level. Additional degenerative changes are seen (for which this study is not tailored to assess). CT Pelvis: Bladder: The urinary bladder is within normal limits. Reproductive Organs: Uterus is heterogeneous and may  contain a small calcified myometrial masses favored to represent a fibroid. There is an incidentally noted 18 x 12 mm simple cyst in the right ovary. Left ovary is within normal limits. Pelvic Lymph nodes: No pelvic lymphadenopathy or pelvic mass is identified. IMPRESSION: 1. Prominence of the left greater than right lower extremity deep venous vessels from the common iliac to the visualized proximal thigh with mild adjacent fat stranding suspicious for an occult deep venous thrombus and adjacent edema, consider correlation with lower extremity Doppler ultrasound. 2. Fusiform infrarenal abdominal aortic aneurysm measuring 3.4 cm in diameter, there is mild fat stranding along the distal abdominal aorta and iliacs, possibly from underlying deep venous thrombosis, noting that aortic pathology is also a consideration (aortitis, or less likely a leaking aneurysm). 3. 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 4. Mild cardiomegaly and scattered coronary arterial calcifications (3 vessel disease). 5. Large stool ball in the rectal vault, consider correlation for fecal impaction, and/or constipation, with a moderate volume of stool and gas in the remainder the colon. 6. Small nonobstructing left-sided renal stone measuring 2 mm. 7. Sludge in the gallbladder without findings of acute cholecystitis. 8. Numerous additional, and incidental findings as noted above. RESULT NOTIFICATION: These observations were discussed by the dictating physician, by phone and acknowledged by the ordering provider HOMAR LANGE at 2/13/2022 7:11 PM [facility] Best Practice Recommendations:  Abdominal Aortic Aneurysm FUSIFORM AORTIC ANEURYSM: AAA Size:                          Follow-up Recommendation : 2.6-2.9 cm                        Every 5 years 3.0-3.4 cm                        Every 3 years 3.5-3.9 cm                        Every 2 years 4.0-4.4 cm                        Every 12  months, vascular consultation recommended 4.5-5.4 cm                        Every 6 months, vascular consultation recommended >5.5 cm                             Vascular surgery consultation recommended 1.  These RP Best Practice recomendations are based on the ACR white paper: J Am Etienne Radiol 2013;10:789-794 2.  For aortas with maximum diameter of 2.6cm and below,  no followup 3.      Saccular AAA of any size:  Recommend vascular consult. 4.      Enlarging AAA > 0.5cm in 6 mos or >1cm in 1 year:  recommend vascular consult [facility] Best Practice Guidelines:  Benign-appearing simple adnexal cysts on CT with IV contrast and MR: (1)(2) Pre-menopause(3) (<= 50 years if LMP unknown): <=5 cm: No follow-up imaging recommended >5 cm - <=7 cm:  US f/u 6-12 weeks >7 cm: Consider MR w/IVC or surgical evaluation Early post-menopause (<=5 years from LMP; > 50 years to <= 55 years if LMP unknown): <=3 cm: No follow-up imaging recommended (4) >3 cm - <=5 cm: US f/u 6-12 months (4) >5 cm - <=7 cm: US f/u promptly >7cm: Consider MR w/IVC or surgical evaluation Late post-menopause (>5 years from LMP; > 55 years if LMP unknown): <=3 cm: No follow-up imaging recommended >3 cm - <=7 cm: US f/u promptly >7 cm: Consider MR w/IVC or surgical evaluation Recommendations for Probably benign adnexal cysts on CT and MR:(1)(2) (benign-appearing cysts on non IV-contrast CT or with one or more of the following complicating factors: angulated margins, not round or oval, poorly visualized such as obscured by artifact or low S/N.) Pre-menopause (<= 50 years if LMP unknown): <=3 cm: No follow-up imaging recommended >3 cm - <=5 cm: US f/u 6-12 weeks >5 cm - <=7 cm: US f/u promptly >7 cm: Consider MR w/IVC or surgical evaluation Early post-menopause (<=5 years from LMP; > 50 years to <= 55 years if LMP unknown): <=3 cm: No follow-up imaging recommended >3 cm - <=7 cm: US f/u promptly >7 cm: Consider MR w/IVC or surgical evaluation Late  post-menopause (>5 years from LMP; > 55 years if LMP unknown): <=1 cm: No follow-up imaging recommended >1 cm - <=7 cm: US f/u promptly >7 cm: Consider MR w/IVC or surgical evaluation ____________________________________________________________________________________ (1)Recommendations based on the 2013 ACR White Paper for Managing Incidental Adnexal Findings on Abdominal and Pelvic CT and MRI: J Am Etienne Radiol 2013;10:675-681 (2)Excludes normal/benign findings such as ovarian calcifications w/o associated non-calcified mass, corpus luteum cyst, previously characterized cyst and cyst with documented stability in size and appearance for >2 years (3)Includes cysts with layering hemorrhage (4)If internal hemorrhage suspected in cyst, US f/u 6-12 weeks Electronically signed by:  Kahlil Donahue MD  2/13/2022 7:16 PM CST Workstation: [id number]

## 2022-02-14 NOTE — PROGRESS NOTES
VANCOMYCIN PHARMACOKINETIC NOTE:  Vancomycin Day # 1    Objective/Assessment:    Diagnosis/Indication for Vancomycin: Sepsis     81 y.o., female; Actual Body Weight = 68 kg (150 lb).    The patient has the following labs:  2/13/2022 Estimated Creatinine Clearance: 19.3 mL/min (A) (based on SCr of 2 mg/dL (H)). Lab Results   Component Value Date    BUN 30 (H) 02/13/2022     Lab Results   Component Value Date    WBC 15.78 (H) 02/13/2022        Plan:  Adjust vancomycin dose and/or frequency based on the patient's actual weight and renal function:  Initiate Vancomycin 1500 mg x1 (intermittent dosing).  Orders have been entered into patient's chart.      Vancomycin random level has been ordered for 2/14/2022 @ 18:30.    Pharmacy will manage vancomycin therapy, monitor serum vancomycin levels, monitor renal function and adjust regimen as necessary.    Thank you for allowing us to participate in this patient's care.     Sandoval Quintero 2/13/2022 10:34 PM  Department of Pharmacy  Ext [phone]

## 2022-02-14 NOTE — PLAN OF CARE
[facility]  Initial Discharge Assessment       Primary Care Provider: Andrew Francisco Iv, MD    Admission Diagnosis: Bilateral pulmonary embolism [I26.99]    Admission Date: 2/13/2022  Expected Discharge Date: 2/16/2022    Discharge Barriers Identified: None     Initial assessment completed at bedside with patient and daughter, Jeaneth. Patient lives at [facility] and is anticipating discharge back to [facility] when medically clear. Patient and caregiver both would like home health if patient qualifies at discharge. Any further needs to be determined during hospitalization.    Payor: [employer] MANAGED MEDICARE / Plan: [employer] MEDICARE GENERIC / Product Type: Capitation /     Extended Emergency Contact Information  Primary Emergency Contact: Jeaneth Clifford   [place]  Mobile Phone: [phone]  Relation: Daughter  Secondary Emergency Contact: CHIEDWARD  Mobile Phone: [phone]  Relation: Daughter  Preferred language: English   needed? No    Discharge Plan A: Home Health  Discharge Plan B: Home Health      [id number] [address] & [address] [address]  Phone: [phone] Fax: [phone]      Initial Assessment (most recent)     Adult Discharge Assessment - 02/14/22 1652        Discharge Assessment    Assessment Type Discharge Planning Assessment     Confirmed/corrected address, phone number and insurance Yes     Confirmed Demographics Correct on Facesheet     Source of Information family;patient     When was your last doctors appointment? --   unknown    Reason For Admission Bilateral pulmonary embolism     Lives With alone     Facility Arrived From: Home ([facility])     Do you expect to return to your current living situation? Yes     Do you have help at home or someone to help you manage your care at home? Yes     Who are your caregiver(s) and their phone  number(s)? Jeaneth Clifford [phone]     Walking or Climbing Stairs Difficulty none     Dressing/Bathing Difficulty none     Do you have any problems with: Needs other help     Home Accessibility wheelchair accessible     Equipment Currently Used at Home none     Readmission within 30 days? No     Patient currently being followed by outpatient case management? No     Do you currently have service(s) that help you manage your care at home? No     Do you take prescription medications? Yes     Do you have prescription coverage? Yes     Coverage [employer]     Do you have any problems affording any of your prescribed medications? No     Is the patient taking medications as prescribed? yes     Who is going to help you get home at discharge? Jeaneth Clifford [phone]     How do you get to doctors appointments? family or friend will provide     Are you on dialysis? No     Do you take coumadin? No     Discharge Plan A Home Health     Discharge Plan B Home Health     DME Needed Upon Discharge  none     Discharge Plan discussed with: Patient;Adult children     Discharge Barriers Identified None

## 2022-02-14 NOTE — ED PROVIDER NOTES
Encounter Date: 2/13/2022       History     Chief Complaint   Patient presents with    Loss of Consciousness     Found unresponsive, hypoxic to 60s in hallway     81-year-old female with history of type 2 diabetes, hypertension, hyperlipidemia, prior DVTs and PE who is supposed to be on anticoagulation with Eliquis but has been noncompliance, presents to the ER for evaluation after a fall this morning.  Patient is unable to explain the reason for her fall.  But was on the ground for unknown amount of time.  And this afternoon was found unresponsive by family with EMS her responsiveness improve she is awake alert and oriented on arrival to the ER.  Per EMS she had stable vital signs with a normal blood pressure.  While in the ER on EMS stretcher prior to being brought to room she became short of breath and tachypneic with sats in the 60s.  She reports she is having some chest pain and shortness of breath.  No cough.  Bilateral legs have been painful recently.  No edema.  Unknown head injury.  She denies neck pain back pain rib pain.  She reports abdominal pain with constipation.  No nausea vomiting or diarrhea        Review of patient's allergies indicates:  No Known Allergies  Past Medical History:   Diagnosis Date    Arthritis     DM2 (diabetes mellitus, type 2)     Glaucoma capsular     HLD (hyperlipidemia)     Hypertension     Kidney stones     Skin lesion of breast      Past Surgical History:   Procedure Laterality Date    BREAST SURGERY      removed lump to right breast     EYE SURGERY      URETERAL STENT PLACEMENT      and removed     Family History   Problem Relation Age of Onset    Coronary artery disease Mother     Diabetes Mother      Social History     Tobacco Use    Smoking status: Former Smoker    Smokeless tobacco: Never Used   Substance Use Topics    Alcohol use: No    Drug use: No     Review of Systems   Constitutional: Positive for fatigue. Negative for activity change, appetite  change, chills, diaphoresis and fever.   HENT: Negative for congestion, postnasal drip, rhinorrhea and sore throat.    Respiratory: Positive for shortness of breath. Negative for cough, chest tightness, wheezing and stridor.    Cardiovascular: Positive for chest pain. Negative for palpitations.   Gastrointestinal: Positive for abdominal pain and constipation. Negative for abdominal distention, blood in stool, diarrhea, nausea and vomiting.   Genitourinary: Negative for dysuria, flank pain, frequency, hematuria and urgency.   Musculoskeletal: Positive for myalgias. Negative for arthralgias, back pain, neck pain and neck stiffness.   Skin: Negative for rash.   Neurological: Positive for syncope. Negative for dizziness, weakness, light-headedness and headaches.   Hematological: Does not bruise/bleed easily.   Psychiatric/Behavioral: Negative for confusion. The patient is not nervous/anxious.    All other systems reviewed and are negative.      Physical Exam     Initial Vitals   BP Pulse Resp Temp SpO2   02/13/22 1630 02/13/22 1600 02/13/22 1600 02/13/22 1629 02/13/22 1627   (!) 142/88 (!) (P) 116 (P) 18 98.1 °F (36.7 °C) 96 %      MAP       --                Physical Exam    Nursing note and vitals reviewed.  Constitutional: She appears well-developed and well-nourished. She is not diaphoretic. No distress.   HENT:   Head: Normocephalic and atraumatic.   Right Ear: External ear normal.   Left Ear: External ear normal.   Nose: Nose normal.   Mouth/Throat: Oropharynx is clear and moist.   Eyes: Conjunctivae and EOM are normal. Pupils are equal, round, and reactive to light.   Neck: Neck supple. No tracheal deviation present.   Patient arrived in a C-collar no cervical spine tenderness on palpation of the neck no visible signs of trauma to the head or neck   Normal range of motion.  Cardiovascular: Normal rate, regular rhythm, normal heart sounds and intact distal pulses. Exam reveals no gallop and no friction rub.    No  murmur heard.  Blood pressure 142/88 pulse 114   Pulmonary/Chest: Breath sounds normal. No stridor. No respiratory distress. She has no wheezes. She has no rhonchi. She has no rales. She exhibits no tenderness.   Respirations 40 sats 96 percent     Abdominal: Abdomen is soft. Bowel sounds are normal. She exhibits distension. She exhibits no mass. There is abdominal tenderness. There is no rebound and no guarding.   Musculoskeletal:         General: No tenderness or edema. Normal range of motion.      Cervical back: Normal range of motion and neck supple.     Neurological: She is alert and oriented to person, place, and time. She has normal strength. No cranial nerve deficit or sensory deficit. GCS score is 15. GCS eye subscore is 4. GCS verbal subscore is 5. GCS motor subscore is 6.   Skin: Skin is warm and dry. No rash noted. No erythema. No pallor.   Psychiatric: She has a normal mood and affect. Her behavior is normal. Judgment and thought content normal.         ED Course   Procedures  Labs Reviewed   CBC W/ AUTO DIFFERENTIAL - Abnormal; Notable for the following components:       Result Value    WBC 15.78 (*)     RBC 5.61 (*)     MCH 25.7 (*)     MCHC 31.5 (*)     RDW 15.1 (*)     Immature Granulocytes 0.8 (*)     Gran # (ANC) 12.4 (*)     Immature Grans (Abs) 0.12 (*)     Gran % 78.2 (*)     Lymph % 14.1 (*)     All other components within normal limits   COMPREHENSIVE METABOLIC PANEL - Abnormal; Notable for the following components:    Glucose 183 (*)     BUN 30 (*)     Creatinine 2.0 (*)     Total Bilirubin 1.3 (*)     eGFR if  26.0 (*)     eGFR if non  22.6 (*)     All other components within normal limits   APTT - Abnormal; Notable for the following components:    aPTT 23.0 (*)     All other components within normal limits   PROTIME-INR - Abnormal; Notable for the following components:    PT 14.1 (*)     All other components within normal limits   LACTIC ACID, PLASMA -  Abnormal; Notable for the following components:    Lactate (Lactic Acid) 4.3 (*)     All other components within normal limits    Narrative:     Lactic Acid critical result(s) repeated. Called and verbal readback   obtained from Sylvie Marcelo RN ER.  by CW1 02/13/2022 17:07   ISTAT CREATININE - Abnormal; Notable for the following components:    POC Creatinine 2.0 (*)     All other components within normal limits   POCT GLUCOSE - Abnormal; Notable for the following components:    POC Glucose 203 (*)     All other components within normal limits   CULTURE, BLOOD   CULTURE, BLOOD   B-TYPE NATRIURETIC PEPTIDE   LIPASE   TROPONIN I   SARS-COV-2 RNA AMPLIFICATION, QUAL   URINALYSIS, REFLEX TO URINE CULTURE   DRUG SCREEN PANEL, URINE EMERGENCY   LACTIC ACID, PLASMA   TYPE & SCREEN   POCT GLUCOSE MONITORING CONTINUOUS   POCT CREATININE     EKG Readings: (Independently Interpreted)   Previous EKG: Compared with most recent EKG Previous EKG Date: 9/8/21. Rhythm: Sinus Tachycardia. Heart Rate: 113. Ectopy: No Ectopy. Conduction: Normal. T Waves Flipped: III, V4, V5, V6 and II.   No sign change      ECG Results          EKG 12-lead (In process)  Result time 02/13/22 16:29:14    In process by Interface, Lab In [facility] (02/13/22 16:29:14)                 Narrative:    Test Reason : R55,    Vent. Rate : 113 BPM     Atrial Rate : 113 BPM     P-R Int : 148 ms          QRS Dur : 072 ms      QT Int : 328 ms       P-R-T Axes : 051 008 -76 degrees     QTc Int : 449 ms    Sinus tachycardia  LVH with repolarization abnormality  Abnormal ECG  When compared with ECG of 08-SEP-2021 11:05,  Aberrant conduction is no longer Present    Referred By:             Confirmed By:                             Imaging Results          US Lower Extremity Veins Bilateral (In process)                CT Cervical Spine Without Contrast (Final result)  Result time 02/13/22 19:16:20    Final result by Kahlil Donahue MD (02/13/22 19:16:20)                  Narrative:    CMS MANDATED QUALITY DATA - CT RADIATION 436    All CT scans at this facility utilize dose modulation, iterative reconstruction, and/or weight based dosing when appropriate to reduce radiation dose to as low as reasonably achievable.    CLINICAL HISTORY:  81 years (1940) Female Syncope, recurrent; Mental status change, unknown cause    TECHNIQUE:  CT HEAD WITHOUT IV CONTRAST, CT CHEST ABDOMEN PELVIS WITHOUT IV CONTRAST, CT CERVICAL SPINE WITHOUT IV CONTRAST. 106 images obtained. Axial CT of the brain without contrast using soft tissue and bone algorithm. .    COMPARISON:  None available.    FINDINGS:  There is a small focus of soft tissue swelling over the right occipital parietal bone with no acute intracranial hemorrhage, hydrocephalus, herniation or midline shift and the basal and suprasellar cisterns are patent. No acute skull fracture is identified.    There is a small rounded defect in the left caudate head (image 19), and right basal ganglia (image 21) favored to represent remote lacunar infarcts.    Mild periventricular deep cerebral white matter low attenuation, slightly more pronounced in the anterior frontal lobes a nonspecific finding in this age group which can be seen in any diffuse white matter process but which is most commonly associated with chronic microvascular ischemic disease. There are scattered atheromatous calcifications in the intracranial internal carotid arteries.    Orbital contents appear within normal limits. External auditory canals are unremarkable. The visualized paranasal sinuses and mastoid air cells are essentially clear.    IMPRESSION:  1. No acute intracranial process.  2. Chronic/involutional findings as noted above.                      CMS MANDATED QUALITY DATA - CT RADIATION  436    All CT scans at this facility utilize dose modulation, iterative reconstruction, and/or weight based dosing when appropriate to reduce radiation dose to as low as reasonably  achievable.    CLINICAL HISTORY:  81 years (1940) Female Syncope, recurrent; Mental status change, unknown cause    TECHNIQUE:  CT HEAD WITHOUT IV CONTRAST, CT CHEST ABDOMEN PELVIS WITHOUT IV CONTRAST, CT CERVICAL SPINE WITHOUT IV CONTRAST. 435 images obtained. Contiguous thin section axial images were obtained of the cervical spine. Sagittal and coronal reformatted images were generated. Note that this exam is suboptimal for evaluation of disc disease (better evaluated on MRI) and does not assess for ligamentous injury or stability.    COMPARISON:  None available.    FINDINGS:  No acute fracture of the cervical spine. No traumatic malalignment of the cervical spine.  There is reversal of the normal lordotic curvature of the cervical spine. No perched, jumped or locked facets seen. Moderate to severe disc height loss and bilateral facet arthropathy at C3-C4, C4-C5 and C5-C6. Additional degenerative changes are present (for which this study is not tailored to assess) Visualized brain is unremarkable. Scattered calcified plaques are seen at the level of the carotids bilaterally.    IMPRESSION:  1. No acute fracture or traumatic malalignment the cervical spine.  2. Reversal of the normal lordotic curvature, likely secondary to a combination of positioning, degenerative change and/or muscle spasm.                      CMS MANDATED QUALITY DATA - CT RADIATION  436    All CT scans at this facility utilize dose modulation, iterative reconstruction, and/or weight based dosing when appropriate to reduce radiation dose to as low as reasonably achievable.    CLINICAL HISTORY:  81 years (1940) Female Syncope, recurrent; Mental status change, unknown cause    TECHNIQUE:  CT HEAD WITHOUT IV CONTRAST, CT CHEST ABDOMEN PELVIS WITHOUT IV CONTRAST, CT CERVICAL SPINE WITHOUT IV CONTRAST.  417 images obtained. Axial CT images of the chest, abdomen and pelvis were obtained from the thoracic inlet to the proximal  thigh.    CONTRAST:  No IV contrast was administered    COMPARISON:  None available.    FINDINGS:  Evaluation of the solid organs and vasculature is suboptimal without contrast.    CT Chest:  Visualized neck: The thyroid gland appears heterogeneous without dominant nodule.  Lungs: There is dependent atelectasis in the bilateral lower lobes.  The lungs are otherwise clear.  Airway: The trachea and central bronchial tree appear normal.  Pleura: There is no pleural effusion. There is no pneumothorax.  Cardiovascular: Heart is normal in size, there are scattered coronary arterial calcifications (3 vessel disease). There are calcified plaques at the aortic arch, and aneurysmal dilation of the apex of the aortic arch measuring up to 4.6 (coronal image 1:15 cm in diameter (image 25)  Mediastinum: There is no supraclavicular, axillary, mediastinal, or hilar lymphadenopathy.  Soft tissues: The peripheral soft tissues appear normal.  Musculoskeletal: The visualized osseous structures appear normal.  There are no suspicious osseous lesions.  Esophagus: The esophagus is grossly normal.  There is a small hiatal hernia.    CT Abdomen:  Liver: The liver is normal in size and imaging appearance.  Gallbladder: There is sludge in the gallbladder without wall thickening, pericholecystic fluid or finding to suggest acute cholecystitis.  Biliary Tree: No intra or extrahepatic ductal dilation.  Spleen: Within normal limits.  Pancreas: The pancreas is normal.  Adrenal Glands:  -- Right: The right adrenal gland is normal in appearance, without evidence of nodule or mass.  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  Kidneys: No hydronephrosis, hydroureter or evidence of obstructive uropathy. There is a 2 mm nonobstructing upper pole left renal stone. There are several rounded simple fluid attenuation structures in the right kidney favored to  represent simple cysts, and completely assessed on this noncontrasted exam, consider nonemergent outpatient follow-up ultrasound.  Vasculature: Scattered calcified plaques are seen in the abdominal aorta. There is aneurysmal dilation of the infrarenal abdominal aorta measuring up to 3.4 x 3.5 cm (image 178) there is mild fat stranding around the distal abdominal aorta and origin iliacs. There is ectasia of the common iliacs measuring 19 mm in diameter on the right, and 15 mm diameter on the left. There is mild prominence of the left greater than right common iliac vein and left lower extremity deep venous vessels suspicious for thrombus, with mild fat stranding.  Lymph nodes: No abdominal lymphadenopathy is seen.  Intraperitoneal structures: There is no ascites.  Bowel: There is a large stool ball seen in the rectal vault (consider correlation for fecal impaction and/or constipation. There is a moderate the appendix is normal (image 156). Volume of stool and gas in the remainder the colon.  Abdominal wall: The abdominal wall and musculature are normal.  Musculoskeletal: There is moderate to severe disc height loss at L4-L5 with grade 1 anterolisthesis. There is severe bilateral facet arthropathy at this level. Additional degenerative changes are seen (for which this study is not tailored to assess).    CT Pelvis:  Bladder: The urinary bladder is within normal limits.  Reproductive Organs: Uterus is heterogeneous and may contain a small calcified myometrial masses favored to represent a fibroid. There is an incidentally noted 18 x 12 mm simple cyst in the right ovary. Left ovary is within normal limits.  Pelvic Lymph nodes: No pelvic lymphadenopathy or pelvic mass is identified.    IMPRESSION:  1. Prominence of the left greater than right lower extremity deep venous vessels from the common iliac to the visualized proximal thigh with mild adjacent fat stranding suspicious for an occult deep venous thrombus and  adjacent edema, consider correlation with lower extremity Doppler ultrasound.  2. Fusiform infrarenal abdominal aortic aneurysm measuring 3.4 cm in diameter, there is mild fat stranding along the distal abdominal aorta and iliacs, possibly from underlying deep venous thrombosis, noting that aortic pathology is also a consideration (aortitis, or less likely a leaking aneurysm).  3. 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  4. Mild cardiomegaly and scattered coronary arterial calcifications (3 vessel disease).  5. Large stool ball in the rectal vault, consider correlation for fecal impaction, and/or constipation, with a moderate volume of stool and gas in the remainder the colon.  6. Small nonobstructing left-sided renal stone measuring 2 mm.  7. Sludge in the gallbladder without findings of acute cholecystitis.  8. Numerous additional, and incidental findings as noted above.                      RESULT NOTIFICATION: These observations were discussed by the dictating physician, by phone and acknowledged by the ordering provider HOMAR LANGE at 2/13/2022 7:11 PM CST      [facility] Best Practice Recommendations:  Abdominal Aortic Aneurysm      FUSIFORM AORTIC ANEURYSM:  AAA Size:                          Follow-up Recommendation :    2.6-2.9 cm                        Every 5 years  3.0-3.4 cm                        Every 3 years  3.5-3.9 cm                        Every 2 years  4.0-4.4 cm                        Every 12 months, vascular consultation recommended  4.5-5.4 cm                        Every 6 months, vascular consultation recommended  >5.5 cm                             Vascular surgery consultation recommended    1.  These RP Best Practice recomendations are based on the ACR white paper: J Am Etienne Radiol 2013;10:789-794  2.  For aortas with maximum diameter of 2.6cm and below,  no followup  3.      Saccular AAA of any size:  Recommend vascular  consult.  4.      Enlarging AAA > 0.5cm in 6 mos or >1cm in 1 year:  recommend vascular consult        [facility] Best Practice Guidelines:  Benign-appearing simple adnexal cysts on CT with IV contrast and MR: (1)(2)      Pre-menopause(3) (<= 50 years if LMP unknown):  <=5 cm: No follow-up imaging recommended  >5 cm - <=7 cm:  US f/u 6-12 weeks  >7 cm: Consider MR w/IVC or surgical evaluation    Early post-menopause (<=5 years from LMP; > 50 years to <= 55 years if LMP unknown):  <=3 cm: No follow-up imaging recommended (4)  >3 cm - <=5 cm: US f/u 6-12 months (4)  >5 cm - <=7 cm: US f/u promptly  >7cm: Consider MR w/IVC or surgical evaluation    Late post-menopause (>5 years from LMP; > 55 years if LMP unknown):  <=3 cm: No follow-up imaging recommended  >3 cm - <=7 cm: US f/u promptly  >7 cm: Consider MR w/IVC or surgical evaluation      Recommendations for Probably benign adnexal cysts  on CT and MR:(1)(2)  (benign-appearing cysts on non IV-contrast  CT or with one or more of the following complicating factors:  angulated margins, not round or oval, poorly visualized such  as obscured by artifact or low S/N.)    Pre-menopause (<= 50 years if LMP unknown):  <=3 cm: No follow-up imaging recommended  >3 cm - <=5 cm: US f/u 6-12 weeks  >5 cm - <=7 cm: US f/u promptly  >7 cm: Consider MR w/IVC or surgical evaluation    Early post-menopause (<=5 years from LMP; > 50 years to <= 55 years if LMP unknown):  <=3 cm: No follow-up imaging recommended  >3 cm - <=7 cm: US f/u promptly  >7 cm: Consider MR w/IVC or surgical evaluation    Late post-menopause (>5 years from LMP; > 55 years if LMP unknown):  <=1 cm: No follow-up imaging recommended  >1 cm - <=7 cm: US f/u promptly  >7 cm: Consider MR w/IVC or surgical evaluation    ____________________________________________________________________________________  (1)Recommendations based on the 2013 ACR White Paper for Managing Incidental Adnexal Findings on Abdominal and  Pelvic CT and MRI: J Am Etienne Radiol 2013;10:675-681  (2)Excludes normal/benign findings such as ovarian calcifications w/o associated non-calcified mass, corpus luteum cyst, previously characterized cyst and cyst with documented stability in size and appearance for >2 years  (3)Includes cysts with layering hemorrhage  (4)If internal hemorrhage suspected in cyst, US f/u 6-12 weeks    Electronically signed by:  Kahlil Donahue MD  2/13/2022 7:16 PM CST Workstation: [id number]                             CT Chest Abdomen Pelvis Without Contrast (XPD) (Final result)  Result time 02/13/22 19:16:20   Procedure changed from CT Abdomen Pelvis  Without Contrast     Final result by Kahlil Donahue MD (02/13/22 19:16:20)                 Narrative:    CMS MANDATED QUALITY DATA - CT RADIATION 436    All CT scans at this facility utilize dose modulation, iterative reconstruction, and/or weight based dosing when appropriate to reduce radiation dose to as low as reasonably achievable.    CLINICAL HISTORY:  81 years (1940) Female Syncope, recurrent; Mental status change, unknown cause    TECHNIQUE:  CT HEAD WITHOUT IV CONTRAST, CT CHEST ABDOMEN PELVIS WITHOUT IV CONTRAST, CT CERVICAL SPINE WITHOUT IV CONTRAST. 106 images obtained. Axial CT of the brain without contrast using soft tissue and bone algorithm. .    COMPARISON:  None available.    FINDINGS:  There is a small focus of soft tissue swelling over the right occipital parietal bone with no acute intracranial hemorrhage, hydrocephalus, herniation or midline shift and the basal and suprasellar cisterns are patent. No acute skull fracture is identified.    There is a small rounded defect in the left caudate head (image 19), and right basal ganglia (image 21) favored to represent remote lacunar infarcts.    Mild periventricular deep cerebral white matter low attenuation, slightly more pronounced in the anterior frontal lobes a nonspecific finding in this age group which  can be seen in any diffuse white matter process but which is most commonly associated with chronic microvascular ischemic disease. There are scattered atheromatous calcifications in the intracranial internal carotid arteries.    Orbital contents appear within normal limits. External auditory canals are unremarkable. The visualized paranasal sinuses and mastoid air cells are essentially clear.    IMPRESSION:  1. No acute intracranial process.  2. Chronic/involutional findings as noted above.                      CMS MANDATED QUALITY DATA - CT RADIATION  436    All CT scans at this facility utilize dose modulation, iterative reconstruction, and/or weight based dosing when appropriate to reduce radiation dose to as low as reasonably achievable.    CLINICAL HISTORY:  81 years (1940) Female Syncope, recurrent; Mental status change, unknown cause    TECHNIQUE:  CT HEAD WITHOUT IV CONTRAST, CT CHEST ABDOMEN PELVIS WITHOUT IV CONTRAST, CT CERVICAL SPINE WITHOUT IV CONTRAST. 435 images obtained. Contiguous thin section axial images were obtained of the cervical spine. Sagittal and coronal reformatted images were generated. Note that this exam is suboptimal for evaluation of disc disease (better evaluated on MRI) and does not assess for ligamentous injury or stability.    COMPARISON:  None available.    FINDINGS:  No acute fracture of the cervical spine. No traumatic malalignment of the cervical spine.  There is reversal of the normal lordotic curvature of the cervical spine. No perched, jumped or locked facets seen. Moderate to severe disc height loss and bilateral facet arthropathy at C3-C4, C4-C5 and C5-C6. Additional degenerative changes are present (for which this study is not tailored to assess) Visualized brain is unremarkable. Scattered calcified plaques are seen at the level of the carotids bilaterally.    IMPRESSION:  1. No acute fracture or traumatic malalignment the cervical spine.  2. Reversal of the normal  lordotic curvature, likely secondary to a combination of positioning, degenerative change and/or muscle spasm.                      CMS MANDATED QUALITY DATA - CT RADIATION  436    All CT scans at this facility utilize dose modulation, iterative reconstruction, and/or weight based dosing when appropriate to reduce radiation dose to as low as reasonably achievable.    CLINICAL HISTORY:  81 years (1940) Female Syncope, recurrent; Mental status change, unknown cause    TECHNIQUE:  CT HEAD WITHOUT IV CONTRAST, CT CHEST ABDOMEN PELVIS WITHOUT IV CONTRAST, CT CERVICAL SPINE WITHOUT IV CONTRAST.  417 images obtained. Axial CT images of the chest, abdomen and pelvis were obtained from the thoracic inlet to the proximal thigh.    CONTRAST:  No IV contrast was administered    COMPARISON:  None available.    FINDINGS:  Evaluation of the solid organs and vasculature is suboptimal without contrast.    CT Chest:  Visualized neck: The thyroid gland appears heterogeneous without dominant nodule.  Lungs: There is dependent atelectasis in the bilateral lower lobes.  The lungs are otherwise clear.  Airway: The trachea and central bronchial tree appear normal.  Pleura: There is no pleural effusion. There is no pneumothorax.  Cardiovascular: Heart is normal in size, there are scattered coronary arterial calcifications (3 vessel disease). There are calcified plaques at the aortic arch, and aneurysmal dilation of the apex of the aortic arch measuring up to 4.6 (coronal image 1:15 cm in diameter (image 25)  Mediastinum: There is no supraclavicular, axillary, mediastinal, or hilar lymphadenopathy.  Soft tissues: The peripheral soft tissues appear normal.  Musculoskeletal: The visualized osseous structures appear normal.  There are no suspicious osseous lesions.  Esophagus: The esophagus is grossly normal.  There is a small hiatal hernia.    CT Abdomen:  Liver: The liver is normal in size and imaging appearance.  Gallbladder: There is  sludge in the gallbladder without wall thickening, pericholecystic fluid or finding to suggest acute cholecystitis.  Biliary Tree: No intra or extrahepatic ductal dilation.  Spleen: Within normal limits.  Pancreas: The pancreas is normal.  Adrenal Glands:  -- Right: The right adrenal gland is normal in appearance, without evidence of nodule or mass.  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  Kidneys: No hydronephrosis, hydroureter or evidence of obstructive uropathy. There is a 2 mm nonobstructing upper pole left renal stone. There are several rounded simple fluid attenuation structures in the right kidney favored to represent simple cysts, and completely assessed on this noncontrasted exam, consider nonemergent outpatient follow-up ultrasound.  Vasculature: Scattered calcified plaques are seen in the abdominal aorta. There is aneurysmal dilation of the infrarenal abdominal aorta measuring up to 3.4 x 3.5 cm (image 178) there is mild fat stranding around the distal abdominal aorta and origin iliacs. There is ectasia of the common iliacs measuring 19 mm in diameter on the right, and 15 mm diameter on the left. There is mild prominence of the left greater than right common iliac vein and left lower extremity deep venous vessels suspicious for thrombus, with mild fat stranding.  Lymph nodes: No abdominal lymphadenopathy is seen.  Intraperitoneal structures: There is no ascites.  Bowel: There is a large stool ball seen in the rectal vault (consider correlation for fecal impaction and/or constipation. There is a moderate the appendix is normal (image 156). Volume of stool and gas in the remainder the colon.  Abdominal wall: The abdominal wall and musculature are normal.  Musculoskeletal: There is moderate to severe disc height loss at L4-L5 with grade 1 anterolisthesis. There is severe bilateral facet arthropathy at this  level. Additional degenerative changes are seen (for which this study is not tailored to assess).    CT Pelvis:  Bladder: The urinary bladder is within normal limits.  Reproductive Organs: Uterus is heterogeneous and may contain a small calcified myometrial masses favored to represent a fibroid. There is an incidentally noted 18 x 12 mm simple cyst in the right ovary. Left ovary is within normal limits.  Pelvic Lymph nodes: No pelvic lymphadenopathy or pelvic mass is identified.    IMPRESSION:  1. Prominence of the left greater than right lower extremity deep venous vessels from the common iliac to the visualized proximal thigh with mild adjacent fat stranding suspicious for an occult deep venous thrombus and adjacent edema, consider correlation with lower extremity Doppler ultrasound.  2. Fusiform infrarenal abdominal aortic aneurysm measuring 3.4 cm in diameter, there is mild fat stranding along the distal abdominal aorta and iliacs, possibly from underlying deep venous thrombosis, noting that aortic pathology is also a consideration (aortitis, or less likely a leaking aneurysm).  3. 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  4. Mild cardiomegaly and scattered coronary arterial calcifications (3 vessel disease).  5. Large stool ball in the rectal vault, consider correlation for fecal impaction, and/or constipation, with a moderate volume of stool and gas in the remainder the colon.  6. Small nonobstructing left-sided renal stone measuring 2 mm.  7. Sludge in the gallbladder without findings of acute cholecystitis.  8. Numerous additional, and incidental findings as noted above.                      RESULT NOTIFICATION: These observations were discussed by the dictating physician, by phone and acknowledged by the ordering provider HOMAR LANGE at 2/13/2022 7:11 PM CST      [facility] Best Practice Recommendations:  Abdominal Aortic Aneurysm      FUSIFORM AORTIC  ANEURYSM:  AAA Size:                          Follow-up Recommendation :    2.6-2.9 cm                        Every 5 years  3.0-3.4 cm                        Every 3 years  3.5-3.9 cm                        Every 2 years  4.0-4.4 cm                        Every 12 months, vascular consultation recommended  4.5-5.4 cm                        Every 6 months, vascular consultation recommended  >5.5 cm                             Vascular surgery consultation recommended    1.  These RP Best Practice recomendations are based on the ACR white paper: J Am Etienne Radiol 2013;10:789-794  2.  For aortas with maximum diameter of 2.6cm and below,  no followup  3.      Saccular AAA of any size:  Recommend vascular consult.  4.      Enlarging AAA > 0.5cm in 6 mos or >1cm in 1 year:  recommend vascular consult        [facility] Best Practice Guidelines:  Benign-appearing simple adnexal cysts on CT with IV contrast and MR: (1)(2)      Pre-menopause(3) (<= 50 years if LMP unknown):  <=5 cm: No follow-up imaging recommended  >5 cm - <=7 cm:  US f/u 6-12 weeks  >7 cm: Consider MR w/IVC or surgical evaluation    Early post-menopause (<=5 years from LMP; > 50 years to <= 55 years if LMP unknown):  <=3 cm: No follow-up imaging recommended (4)  >3 cm - <=5 cm: US f/u 6-12 months (4)  >5 cm - <=7 cm: US f/u promptly  >7cm: Consider MR w/IVC or surgical evaluation    Late post-menopause (>5 years from LMP; > 55 years if LMP unknown):  <=3 cm: No follow-up imaging recommended  >3 cm - <=7 cm: US f/u promptly  >7 cm: Consider MR w/IVC or surgical evaluation      Recommendations for Probably benign adnexal cysts  on CT and MR:(1)(2)  (benign-appearing cysts on non IV-contrast  CT or with one or more of the following complicating factors:  angulated margins, not round or oval, poorly visualized such  as obscured by artifact or low S/N.)    Pre-menopause (<= 50 years if LMP unknown):  <=3 cm: No follow-up imaging recommended  >3 cm - <=5  cm: US f/u 6-12 weeks  >5 cm - <=7 cm: US f/u promptly  >7 cm: Consider MR w/IVC or surgical evaluation    Early post-menopause (<=5 years from LMP; > 50 years to <= 55 years if LMP unknown):  <=3 cm: No follow-up imaging recommended  >3 cm - <=7 cm: US f/u promptly  >7 cm: Consider MR w/IVC or surgical evaluation    Late post-menopause (>5 years from LMP; > 55 years if LMP unknown):  <=1 cm: No follow-up imaging recommended  >1 cm - <=7 cm: US f/u promptly  >7 cm: Consider MR w/IVC or surgical evaluation    ____________________________________________________________________________________  (1)Recommendations based on the 2013 ACR White Paper for Managing Incidental Adnexal Findings on Abdominal and Pelvic CT and MRI: J Am Etienne Radiol 2013;10:675-681  (2)Excludes normal/benign findings such as ovarian calcifications w/o associated non-calcified mass, corpus luteum cyst, previously characterized cyst and cyst with documented stability in size and appearance for >2 years  (3)Includes cysts with layering hemorrhage  (4)If internal hemorrhage suspected in cyst, US f/u 6-12 weeks    Electronically signed by:  Kahlil Donahue MD  2/13/2022 7:16 PM CST Workstation: [id number]                             CT Head Without Contrast (Final result)  Result time 02/13/22 19:16:20    Final result by Kahlil Donahue MD (02/13/22 19:16:20)                 Narrative:    CMS MANDATED QUALITY DATA - CT RADIATION 436    All CT scans at this facility utilize dose modulation, iterative reconstruction, and/or weight based dosing when appropriate to reduce radiation dose to as low as reasonably achievable.    CLINICAL HISTORY:  81 years (1940) Female Syncope, recurrent; Mental status change, unknown cause    TECHNIQUE:  CT HEAD WITHOUT IV CONTRAST, CT CHEST ABDOMEN PELVIS WITHOUT IV CONTRAST, CT CERVICAL SPINE WITHOUT IV CONTRAST. 106 images obtained. Axial CT of the brain without contrast using soft tissue and bone algorithm.  .    COMPARISON:  None available.    FINDINGS:  There is a small focus of soft tissue swelling over the right occipital parietal bone with no acute intracranial hemorrhage, hydrocephalus, herniation or midline shift and the basal and suprasellar cisterns are patent. No acute skull fracture is identified.    There is a small rounded defect in the left caudate head (image 19), and right basal ganglia (image 21) favored to represent remote lacunar infarcts.    Mild periventricular deep cerebral white matter low attenuation, slightly more pronounced in the anterior frontal lobes a nonspecific finding in this age group which can be seen in any diffuse white matter process but which is most commonly associated with chronic microvascular ischemic disease. There are scattered atheromatous calcifications in the intracranial internal carotid arteries.    Orbital contents appear within normal limits. External auditory canals are unremarkable. The visualized paranasal sinuses and mastoid air cells are essentially clear.    IMPRESSION:  1. No acute intracranial process.  2. Chronic/involutional findings as noted above.                      CMS MANDATED QUALITY DATA - CT RADIATION  436    All CT scans at this facility utilize dose modulation, iterative reconstruction, and/or weight based dosing when appropriate to reduce radiation dose to as low as reasonably achievable.    CLINICAL HISTORY:  81 years (1940) Female Syncope, recurrent; Mental status change, unknown cause    TECHNIQUE:  CT HEAD WITHOUT IV CONTRAST, CT CHEST ABDOMEN PELVIS WITHOUT IV CONTRAST, CT CERVICAL SPINE WITHOUT IV CONTRAST. 435 images obtained. Contiguous thin section axial images were obtained of the cervical spine. Sagittal and coronal reformatted images were generated. Note that this exam is suboptimal for evaluation of disc disease (better evaluated on MRI) and does not assess for ligamentous injury or stability.    COMPARISON:  None  available.    FINDINGS:  No acute fracture of the cervical spine. No traumatic malalignment of the cervical spine.  There is reversal of the normal lordotic curvature of the cervical spine. No perched, jumped or locked facets seen. Moderate to severe disc height loss and bilateral facet arthropathy at C3-C4, C4-C5 and C5-C6. Additional degenerative changes are present (for which this study is not tailored to assess) Visualized brain is unremarkable. Scattered calcified plaques are seen at the level of the carotids bilaterally.    IMPRESSION:  1. No acute fracture or traumatic malalignment the cervical spine.  2. Reversal of the normal lordotic curvature, likely secondary to a combination of positioning, degenerative change and/or muscle spasm.                      CMS MANDATED QUALITY DATA - CT RADIATION  436    All CT scans at this facility utilize dose modulation, iterative reconstruction, and/or weight based dosing when appropriate to reduce radiation dose to as low as reasonably achievable.    CLINICAL HISTORY:  81 years (1940) Female Syncope, recurrent; Mental status change, unknown cause    TECHNIQUE:  CT HEAD WITHOUT IV CONTRAST, CT CHEST ABDOMEN PELVIS WITHOUT IV CONTRAST, CT CERVICAL SPINE WITHOUT IV CONTRAST.  417 images obtained. Axial CT images of the chest, abdomen and pelvis were obtained from the thoracic inlet to the proximal thigh.    CONTRAST:  No IV contrast was administered    COMPARISON:  None available.    FINDINGS:  Evaluation of the solid organs and vasculature is suboptimal without contrast.    CT Chest:  Visualized neck: The thyroid gland appears heterogeneous without dominant nodule.  Lungs: There is dependent atelectasis in the bilateral lower lobes.  The lungs are otherwise clear.  Airway: The trachea and central bronchial tree appear normal.  Pleura: There is no pleural effusion. There is no pneumothorax.  Cardiovascular: Heart is normal in size, there are scattered coronary  arterial calcifications (3 vessel disease). There are calcified plaques at the aortic arch, and aneurysmal dilation of the apex of the aortic arch measuring up to 4.6 (coronal image 1:15 cm in diameter (image 25)  Mediastinum: There is no supraclavicular, axillary, mediastinal, or hilar lymphadenopathy.  Soft tissues: The peripheral soft tissues appear normal.  Musculoskeletal: The visualized osseous structures appear normal.  There are no suspicious osseous lesions.  Esophagus: The esophagus is grossly normal.  There is a small hiatal hernia.    CT Abdomen:  Liver: The liver is normal in size and imaging appearance.  Gallbladder: There is sludge in the gallbladder without wall thickening, pericholecystic fluid or finding to suggest acute cholecystitis.  Biliary Tree: No intra or extrahepatic ductal dilation.  Spleen: Within normal limits.  Pancreas: The pancreas is normal.  Adrenal Glands:  -- Right: The right adrenal gland is normal in appearance, without evidence of nodule or mass.  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  Kidneys: No hydronephrosis, hydroureter or evidence of obstructive uropathy. There is a 2 mm nonobstructing upper pole left renal stone. There are several rounded simple fluid attenuation structures in the right kidney favored to represent simple cysts, and completely assessed on this noncontrasted exam, consider nonemergent outpatient follow-up ultrasound.  Vasculature: Scattered calcified plaques are seen in the abdominal aorta. There is aneurysmal dilation of the infrarenal abdominal aorta measuring up to 3.4 x 3.5 cm (image 178) there is mild fat stranding around the distal abdominal aorta and origin iliacs. There is ectasia of the common iliacs measuring 19 mm in diameter on the right, and 15 mm diameter on the left. There is mild prominence of the left greater than right common iliac vein  and left lower extremity deep venous vessels suspicious for thrombus, with mild fat stranding.  Lymph nodes: No abdominal lymphadenopathy is seen.  Intraperitoneal structures: There is no ascites.  Bowel: There is a large stool ball seen in the rectal vault (consider correlation for fecal impaction and/or constipation. There is a moderate the appendix is normal (image 156). Volume of stool and gas in the remainder the colon.  Abdominal wall: The abdominal wall and musculature are normal.  Musculoskeletal: There is moderate to severe disc height loss at L4-L5 with grade 1 anterolisthesis. There is severe bilateral facet arthropathy at this level. Additional degenerative changes are seen (for which this study is not tailored to assess).    CT Pelvis:  Bladder: The urinary bladder is within normal limits.  Reproductive Organs: Uterus is heterogeneous and may contain a small calcified myometrial masses favored to represent a fibroid. There is an incidentally noted 18 x 12 mm simple cyst in the right ovary. Left ovary is within normal limits.  Pelvic Lymph nodes: No pelvic lymphadenopathy or pelvic mass is identified.    IMPRESSION:  1. Prominence of the left greater than right lower extremity deep venous vessels from the common iliac to the visualized proximal thigh with mild adjacent fat stranding suspicious for an occult deep venous thrombus and adjacent edema, consider correlation with lower extremity Doppler ultrasound.  2. Fusiform infrarenal abdominal aortic aneurysm measuring 3.4 cm in diameter, there is mild fat stranding along the distal abdominal aorta and iliacs, possibly from underlying deep venous thrombosis, noting that aortic pathology is also a consideration (aortitis, or less likely a leaking aneurysm).  3. 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  4. Mild cardiomegaly and scattered coronary arterial calcifications (3 vessel disease).  5. Large stool ball  in the rectal vault, consider correlation for fecal impaction, and/or constipation, with a moderate volume of stool and gas in the remainder the colon.  6. Small nonobstructing left-sided renal stone measuring 2 mm.  7. Sludge in the gallbladder without findings of acute cholecystitis.  8. Numerous additional, and incidental findings as noted above.                      RESULT NOTIFICATION: These observations were discussed by the dictating physician, by phone and acknowledged by the ordering provider HOMAR LANGE at 2/13/2022 7:11 PM [facility]      [facility] Best Practice Recommendations:  Abdominal Aortic Aneurysm      FUSIFORM AORTIC ANEURYSM:  AAA Size:                          Follow-up Recommendation :    2.6-2.9 cm                        Every 5 years  3.0-3.4 cm                        Every 3 years  3.5-3.9 cm                        Every 2 years  4.0-4.4 cm                        Every 12 months, vascular consultation recommended  4.5-5.4 cm                        Every 6 months, vascular consultation recommended  >5.5 cm                             Vascular surgery consultation recommended    1.  These RP Best Practice recomendations are based on the ACR white paper: J Am Etienne Radiol 2013;10:789-794  2.  For aortas with maximum diameter of 2.6cm and below,  no followup  3.      Saccular AAA of any size:  Recommend vascular consult.  4.      Enlarging AAA > 0.5cm in 6 mos or >1cm in 1 year:  recommend vascular consult        [facility] Best Practice Guidelines:  Benign-appearing simple adnexal cysts on CT with IV contrast and MR: (1)(2)      Pre-menopause(3) (<= 50 years if LMP unknown):  <=5 cm: No follow-up imaging recommended  >5 cm - <=7 cm:  US f/u 6-12 weeks  >7 cm: Consider MR w/IVC or surgical evaluation    Early post-menopause (<=5 years from LMP; > 50 years to <= 55 years if LMP unknown):  <=3 cm: No follow-up imaging recommended (4)  >3 cm - <=5 cm: US f/u 6-12 months (4)  >5 cm - <=7  cm: US f/u promptly  >7cm: Consider MR w/IVC or surgical evaluation    Late post-menopause (>5 years from LMP; > 55 years if LMP unknown):  <=3 cm: No follow-up imaging recommended  >3 cm - <=7 cm: US f/u promptly  >7 cm: Consider MR w/IVC or surgical evaluation      Recommendations for Probably benign adnexal cysts  on CT and MR:(1)(2)  (benign-appearing cysts on non IV-contrast  CT or with one or more of the following complicating factors:  angulated margins, not round or oval, poorly visualized such  as obscured by artifact or low S/N.)    Pre-menopause (<= 50 years if LMP unknown):  <=3 cm: No follow-up imaging recommended  >3 cm - <=5 cm: US f/u 6-12 weeks  >5 cm - <=7 cm: US f/u promptly  >7 cm: Consider MR w/IVC or surgical evaluation    Early post-menopause (<=5 years from LMP; > 50 years to <= 55 years if LMP unknown):  <=3 cm: No follow-up imaging recommended  >3 cm - <=7 cm: US f/u promptly  >7 cm: Consider MR w/IVC or surgical evaluation    Late post-menopause (>5 years from LMP; > 55 years if LMP unknown):  <=1 cm: No follow-up imaging recommended  >1 cm - <=7 cm: US f/u promptly  >7 cm: Consider MR w/IVC or surgical evaluation    ____________________________________________________________________________________  (1)Recommendations based on the 2013 ACR White Paper for Managing Incidental Adnexal Findings on Abdominal and Pelvic CT and MRI: J Am Etienne Radiol 2013;10:675-681  (2)Excludes normal/benign findings such as ovarian calcifications w/o associated non-calcified mass, corpus luteum cyst, previously characterized cyst and cyst with documented stability in size and appearance for >2 years  (3)Includes cysts with layering hemorrhage  (4)If internal hemorrhage suspected in cyst, US f/u 6-12 weeks    Electronically signed by:  Kahlil Donahue MD  2/13/2022 7:16 PM CST Workstation: [id number]                             NM Lung Scan Perfusion Particulate (Final result)  Result time 02/13/22 18:47:50    Procedure changed from NM Lung Scan Ventilation Perfusion     Final result by Kahlil Donahue MD (02/13/22 18:47:50)                 Narrative:    CLINICAL HISTORY:  81 years (1940) Female Pulmonary embolism (PE) suspected, high prob; high prob of pe PT FELL TO FLOOR EARLIER; DOSE 4.9 mCI 99mTC MAA; NO KNOWN LUNG DISEASE; NON SMOKER; ELEVATED CREATINE; CHEST XRAY TODAY    TECHNIQUE:  NM LUNG PERFUSION. 2 images obtained. Eight standard projections of the lungs were acquired after 4.9 mCi Tc-99m MAA was injected intravenously. No ventilation images were obtained secondary to COVID-19 precautions/protocol.    COMPARISON:  Radiograph of the chest from the same day.    FINDINGS:  Perfusion images demonstrate moderate size wedge-shaped defect in the posterior lateral right lower lobe, and moderate size defect in the anterior right lower lobe suspicious for a pulmonary embolus.    No discrete radiographic abnormality is identified in these areas.    No ventilation images were obtained.    IMPRESSION:  Intermediate-to high probability for acute PE.          Electronically signed by:  Kahlil Donahue MD  2/13/2022 6:47 PM CST Workstation: [id number]                             X-Ray Chest AP Portable (Final result)  Result time 02/13/22 16:18:39    Final result by Kahlil Donahue MD (02/13/22 16:18:39)                 Narrative:    CLINICAL HISTORY:  81 years (1940) Female sob LOC    TECHNIQUE:  Portable AP radiograph the chest.    COMPARISON:  Radiograph from April 13, 2018.    FINDINGS:  The lungs are clear. Costophrenic angles are seen without effusion. No pneumothorax is identified. The heart is normal in size. The aortic contour is quite prominent, a finding likely indicating either an aortic aneurysm or dissection. There are degenerative changes in the shoulders. The visualized upper abdomen is unremarkable.    IMPRESSION:  1. No acute pulmonary process.  2. Prominence of the aortic knob  suggestive of an aneurysm or dissection, new/worse when compared to the previous exam, consider dedicated contrast-enhanced CT follow-up.                            Electronically signed by:  Kahlil Donahue MD  2/13/2022 4:18 PM CST Workstation: [id number]                            X-Rays:   Independently Interpreted Readings:   Chest X-Ray: Normal heart size.  No infiltrates.     Medications   vancomycin - pharmacy to dose (has no administration in time range)   vancomycin in dextrose 5 % 1 gram/250 mL IVPB 1,000 mg (1,000 mg Intravenous New Bag 2/13/22 1926)     And   vancomycin 500 mg in dextrose 5 % 100 mL IVPB (ready to mix system) (500 mg Intravenous New Bag 2/13/22 1926)   sodium chloride 0.9% bolus 500 mL (0 mLs Intravenous Stopped 2/13/22 1912)   sodium chloride 0.9% bolus 1,550 mL (1,550 mLs Intravenous New Bag 2/13/22 1815)   piperacillin-tazobactam 4.5 g in dextrose 5 % 100 mL IVPB (ready to mix system) (0 g Intravenous Stopped 2/13/22 1912)   enoxaparin injection 70 mg (70 mg Subcutaneous Given 2/13/22 1925)     Medical Decision Making:   Independently Interpreted Test(s):   I have ordered and independently interpreted X-rays - see prior notes.  I have ordered and independently interpreted EKG Reading(s) - see prior notes  Clinical Tests:   Lab Tests: Ordered and Reviewed  The following lab test(s) were unremarkable: Troponin, Lipase and BNP       <> Summary of Lab: COVID negative  Lactate 4.3    Blood cultures pending x2  PTT 23 PT 14.1 INR 1.2  Glucose 183 BUN 30 creatinine 2 bili 1.3 GFR 26    White count 15.78 ANC 12.4  Radiological Study: Ordered and Reviewed  Medical Tests: Ordered and Reviewed  ED Management:  81-year-old female with history of type 2 diabetes, hypertension, hyperlipidemia, prior DVTs and PE who is supposed to be on anticoagulation with Eliquis but has been noncompliance, presents to the ER for evaluation after a fall this morning.  Patient is unable to explain the reason for  her fall.  But was on the ground for unknown amount of time.  And this afternoon was found unresponsive by family with EMS her responsiveness improve she is awake alert and oriented on arrival to the ER.  Per EMS she had stable vital signs with a normal blood pressure.  While in the ER on EMS stretcher prior to being brought to room she became short of breath and tachypneic with sats in the 60s.  She reports she is having some chest pain and shortness of breath.  No cough.  Bilateral legs have been painful recently.  No edema.  Unknown head injury.  She denies neck pain back pain rib pain.  She reports abdominal pain with constipation.  No nausea vomiting or diarrhea.  On physical exam patient was originally evaluated on the EMS stretcher.  She was tachycardic 114 sats were in the 90s on 2 liters nasal cannula.  This was placed due to patient having a sudden drop to the 60s while on the EMS stretcher.  Patient also became tachypneic and complained of shortness of breath.  During my evaluation she did not have shortness of breath.  Was cleaning of chest pain that was reproducible and abdominal pain with abdominal distension normal bowel sounds.  C-collar in place no cervical spine tenderness no signs of trauma to the head patient does with dry on exam.  No trauma to upper lower extremities no signs of trauma to the chest abdomen or back.  EKG similar appearance to passed the today is sinus tach with inverted T-waves in inferior and lateral leads  Accu-Chek 203 point of care creatinine 2  Chest x-ray revealed no acute abnormalities.  Radiology did report the aortic knob suggested possible aneurysm or dissection which is new when compared to prior x-ray.  With the patient's fall at home she is awaiting CT scan of the head and cervical spine both of which returned and were negative for any acute abnormalities.  C-collar was cleared.  She had a CT scan done of the chest abdomen and pelvis without contrast and Radiology  called to inform me that they have concerned that she has DVTs in bilateral lower extremities, had fecal impaction, and has a fusiform infrarenal abdominal aortic aneurysm measuring 3.4 centimeters with mild fat stranding along the distal abdominal aorta which radiology reports looks like this is related to underlying DVT.  She also has aneurysmal dilation of 8 the aortic arch measuring up to 4.67 centimeters.  V/Q scan revealed 2 areas concerning for moderate sized pulmonary embolism in the right lung.  With the patient's symptoms of syncope unresponsiveness hypoxia tachypnea and tachycardia I believe patient needs treated for her PEs and DVTs.  Her aneurysms are not symptomatic at this time and are not requiring surgery.  She will be treated with Lovenox.  She has not required any further oxygen here as she has improved and is no longer complaining of shortness of breath.  Sats are 95 percent on room air.  Her other labs revealed a elevated lactate of 4.3 and patient had a white count of 15.78 due to original concern for possible infection she had blood cultures x2 drawn and received vanc and Zosyn IV.  We have not found a source of infection at this time.  COVID is negative, no anemia, CMP a BUN of 30 and creatinine of 2 patient had the same elevation creatinine in September of this past year.  She is being admitted to Hospital Medicine I have spoken with Mrs Shahid Iglesias.   Kecia Santiago M.D.  8:03 PM 2/13/2022            Other:   I discussed test(s) with the performing physician.       <> Summary of the Findings: Dr. Donahue, [facility], discussed CT head, cervical spine, chest abdomen pelvis, and V/Q scan  I have discussed this case with another health care provider.            Attending Attestation:         Attending Critical Care:   Critical Care Times:   Direct Patient Care (initial evaluation, reassessments, and time considering the case)................................................................10 minutes.    Additional History from reviewing old medical records or taking additional history from the family, EMS, PCP, etc.......................5 minutes.   Ordering, Reviewing, and Interpreting Diagnostic Studies...............................................................................................................5 minutes.   Documentation..................................................................................................................................................................................10 minutes.   Consultation with other Physicians. .................................................................................................................................................5 minutes.   Consultation with the patient's family directly relating to the patient's condition, care, and DNR status (when patient unable)......5 minutes.   ==============================================================  · Total Critical Care Time - exclusive of procedural time: 40 minutes.  ==============================================================  Critical care reasons: PE, DVT syncope, lactic acidosis, hypoxia,    Critical care was time spent personally by me on the following activities: obtaining history from patient or relative, examination of patient, review of old charts, ordering lab, x-rays, and/or EKG, development of treatment plan with patient or relative, ordering and performing treatments and interventions, evaluation of patient's response to treatment, discussion with consultants, interpretation of cardiac measurements and re-evaluation of patient's conition.   Critical Care Condition: life-threatening           ED Course as of 02/13/22 1950   Sun Feb 13, 2022   1546 EMS , unresponsive, couldn't palpate a pulse, daughter called, clammy, fall, on blood thinners. Accucheck: 237, slightly confused. Doesn't know year. DM HTN.  [AS]      ED Course User Index  [AS] Belle Chadwick NP              Clinical Impression:   Final diagnoses:  [R55] Syncope  [I82.409] DVT (deep venous thrombosis)  [I26.99] Bilateral pulmonary embolism (Primary)  [R09.02] Hypoxia  [R55] Syncope, unspecified syncope type  [I71.2] Thoracic aortic aneurysm without rupture          ED Disposition Condition    Admit               Kecia Santiago MD  02/13/22 2003

## 2022-02-14 NOTE — NURSING
patient requests lactulose med to be given early AM hours instead of bedtime. Will continue to monitor.

## 2022-02-14 NOTE — CONSULTS
[facility]  Hematology/Oncology  Consult Note    Patient Name: Azul Clifford  MRN: [id number]  Admission Date: 2/13/2022  Hospital Length of Stay: 1 days  Code Status: Full Code   Attending Provider: Janette Juan MD  Consulting Provider: Storm Velazquez MD  Primary Care Physician: Andrew Francisco Iv, MD  Principal Problem:Acute bilateral deep vein thrombosis (DVT) of femoral veins    Consults  Subjective:     HPI:  Ms. Clifford is a 80yo female admitted to [facility] 2/13/2022 after she was found down and unresponsive at home.  Evidently she was awake and alert in the ER but complaining of sob.  She has a significant history of PE/DVT in April of 2018.  I was able to verify on LE US and V/Q done on 4/14/2018 which showed a RLE DVT and very high prob v/q.   She is now found to have a LLE DVT and int-high prob V/Q and has been started on Lovenox 70mg SC.  She states she is feeling better at this time.   She does not appear to have been on anticoagulants, other than ASA and is unsure why.        Oncology Treatment Plan:   [No treatment plan]    Medications:  Continuous Infusions:  Scheduled Meds:   aspirin  81 mg Oral Daily    atorvastatin  40 mg Oral QHS    cloNIDine  0.1 mg Oral BID    enoxaparin  1 mg/kg Subcutaneous Q24H    latanoprost  1 drop Both Eyes QHS    losartan  50 mg Oral Daily    polyethylene glycol  17 g Oral Daily    white petrolatum   Topical (Top) Daily     PRN Meds:acetaminophen, acetaminophen, bisacodyL, dextrose 50%, dextrose 50%, glucagon (human recombinant), glucose, glucose, hydrALAZINE, insulin aspart U-100, magnesium oxide, magnesium oxide, naloxone, ondansetron, potassium bicarbonate, potassium bicarbonate, potassium bicarbonate, potassium, sodium phosphates, potassium, sodium phosphates, potassium, sodium phosphates, sodium chloride 0.9%, traMADoL, Pharmacy to dose Vancomycin consult **AND** vancomycin - pharmacy to dose     Review of patient's allergies indicates:  No  Known Allergies     Past Medical History:   Diagnosis Date    Arthritis     DM2 (diabetes mellitus, type 2)     Glaucoma capsular     HLD (hyperlipidemia)     Hypertension     Kidney stones     Skin lesion of breast      Past Surgical History:   Procedure Laterality Date    BREAST SURGERY      removed lump to right breast     EYE SURGERY      URETERAL STENT PLACEMENT      and removed     Family History     Problem Relation (Age of Onset)    Coronary artery disease Mother    Diabetes Mother        Tobacco Use    Smoking status: Former Smoker    Smokeless tobacco: Never Used   Substance and Sexual Activity    Alcohol use: No    Drug use: No    Sexual activity: Not on file       Review of Systems   Constitutional: Negative for activity change, appetite change, diaphoresis, fatigue, fever and unexpected weight change.   HENT: Negative for congestion and hearing loss.    Eyes: Negative for visual disturbance.   Respiratory: Negative for cough, chest tightness and shortness of breath.    Cardiovascular: Negative for chest pain and leg swelling.   Gastrointestinal: Negative for abdominal pain, blood in stool, diarrhea, nausea and vomiting.   Endocrine: Negative for cold intolerance and heat intolerance.   Genitourinary: Negative for difficulty urinating, dysuria and hematuria.   Neurological: Negative for dizziness and headaches.   Hematological: Negative for adenopathy. Does not bruise/bleed easily.   Psychiatric/Behavioral: Negative for behavioral problems.     Objective:     Vital Signs (Most Recent):  Temp: 98.8 °F (37.1 °C) (02/14/22 1639)  Pulse: 87 (02/14/22 1639)  Resp: 18 (02/14/22 1639)  BP: (!) 84/62 (02/14/22 1639)  SpO2: 97 % (02/14/22 1639) Vital Signs (24h Range):  Temp:  [98.2 °F (36.8 °C)-98.8 °F (37.1 °C)] 98.8 °F (37.1 °C)  Pulse:  [] 87  Resp:  [16-18] 18  SpO2:  [96 %-99 %] 97 %  BP: ()/() 84/62     Weight: 62.1 kg (136 lb 14.5 oz)  Body mass index is 25.04 kg/m².  Body  surface area is 1.65 meters squared.      Intake/Output Summary (Last 24 hours) at 2/14/2022 1748  Last data filed at 2/14/2022 0442  Gross per 24 hour   Intake 240 ml   Output --   Net 240 ml       Physical Exam  Constitutional:       Appearance: She is well-developed and well-nourished.   HENT:      Head: Normocephalic and atraumatic.      Right Ear: External ear normal.      Left Ear: External ear normal.      Mouth/Throat:      Mouth: Oropharynx is clear and moist.   Eyes:      Conjunctiva/sclera: Conjunctivae normal.      Pupils: Pupils are equal, round, and reactive to light.   Neck:      Thyroid: No thyromegaly.      Trachea: No tracheal deviation.   Cardiovascular:      Rate and Rhythm: Normal rate and regular rhythm.      Heart sounds: Normal heart sounds.   Pulmonary:      Effort: Pulmonary effort is normal.      Breath sounds: Normal breath sounds.   Abdominal:      General: Bowel sounds are normal. There is no distension.      Palpations: Abdomen is soft. There is no mass.      Tenderness: There is no abdominal tenderness.   Musculoskeletal:         General: No edema.   Skin:     Findings: No rash.   Neurological:      Comments: Neuro intact througout   Psychiatric:         Mood and Affect: Mood and affect normal.         Behavior: Behavior normal.         Thought Content: Thought content normal.         Judgment: Judgment normal.         Significant Labs:   CBC:   Recent Labs   Lab 02/13/22  1621 02/14/22  0254   WBC 15.78* 11.01   HGB 14.4 13.4   HCT 45.7 44.3    125*    and CMP:   Recent Labs   Lab 02/13/22  1621 02/14/22  0254    140   K 3.6 3.8    104   CO2 23 21*   * 98   BUN 30* 32*   CREATININE 2.0* 1.7*   CALCIUM 9.9 9.2   PROT 7.6 7.1   ALBUMIN 3.6 3.2*   BILITOT 1.3* 1.0   ALKPHOS 129 105   AST 16 12   ALT 10 6*   ANIONGAP 16 15   EGFRNONAA 22.6* 27.5*       Diagnostic Results:  None    Assessment/Plan:     Acute pulmonary embolism  Ms. Clifford has now been diagnosed  with multiple episodes of documented DVT/PE and will need to be on long term anticoagulant.  She likely has an underlying blood clotting disorder and I can work this up as an OP but this will not impact the decision for long term anticoagulation.  She is on Lovenox at this time and would continue this with plans to transition her to oral anticoagulant with Eliquis.   Will arrange follow up with her in the office after discharge.          Thank you for your consult. I will follow-up with patient. Please contact us if you have any additional questions.    Storm Velazquez MD  Hematology/Oncology  [facility]

## 2022-02-14 NOTE — CONSULTS
[facility]  Vascular Surgery  Consult Note    Inpatient consult to Vascular Surgery  Consult performed by: Ali Khoobehi, MD  Consult ordered by: tSeven Iglesias, DANNY        Subjective:     Chief Complaint/Reason for Admission: DVT    History of Present Illness: 82yo female admitted to [facility] 2/13/2022 after she was found down at home.  Noted to have SOB in ED. Pt without complaints now. Pt has a hx of RLE DVT in 2018, now with extensive soham LE DVT. Pt was not on AC prior to admission.    Medications Prior to Admission   Medication Sig Dispense Refill Last Dose    amLODIPine (NORVASC) 10 MG tablet Take 10 mg by mouth once daily.   Past Week at Unknown time    apixaban 5 mg Tab Take 1 tablet (5 mg total) by mouth 2 (two) times daily. 60 tablet 5 Past Week at Unknown time    aspirin (ECOTRIN) 81 MG EC tablet Take 81 mg by mouth once daily.   Past Week at Unknown time    cloNIDine (CATAPRES) 0.1 MG tablet Take 0.1 mg by mouth 2 (two) times daily.   Past Week at Unknown time    ergocalciferol (ERGOCALCIFEROL) 50,000 unit Cap Take 1,250 mcg by mouth once daily.   Past Week at Unknown time    hydroCHLOROthiazide (MICROZIDE) 12.5 mg capsule Take 12.5 mg by mouth once daily.   Past Week at Unknown time    metFORMIN (GLUCOPHAGE) 500 MG tablet Take 1 tablet (500 mg total) by mouth 2 (two) times daily with meals. On hold during hospital as of April 18th because of low blood sugars, please reassess with pcp   Past Week at Unknown time    simvastatin (ZOCOR) 40 MG tablet Take 40 mg by mouth once daily.   Past Week at Unknown time    traMADoL (ULTRAM) 50 mg tablet Take 50 mg by mouth 3 (three) times daily as needed.   Past Week at Unknown time    TRAVATAN Z 0.004 % Drop 1 drop 2 (two) times daily.    Past Week at Unknown time    alcohol antiseptic pads (ALCOHOL PADS TOP) USE TWICE DAILY PRIOR TO FINGER STICK   Unknown at Unknown time    alcohol antiseptic pads (ALCOHOL SWABS TOP)    Unknown at Unknown  time       Review of patient's allergies indicates:  No Known Allergies    Past Medical History:   Diagnosis Date    Arthritis     DM2 (diabetes mellitus, type 2)     Glaucoma capsular     HLD (hyperlipidemia)     Hypertension     Kidney stones     Skin lesion of breast      Past Surgical History:   Procedure Laterality Date    BREAST SURGERY      removed lump to right breast     EYE SURGERY      URETERAL STENT PLACEMENT      and removed     Family History     Problem Relation (Age of Onset)    Coronary artery disease Mother    Diabetes Mother        Tobacco Use    Smoking status: Former Smoker    Smokeless tobacco: Never Used   Substance and Sexual Activity    Alcohol use: No    Drug use: No    Sexual activity: Not on file     Review of Systems   Constitutional: Negative for chills and fever.   Respiratory: Positive for shortness of breath.    Cardiovascular: Positive for leg swelling.   All other systems reviewed and are negative.    Objective:     Vital Signs (Most Recent):  Temp: 98.8 °F (37.1 °C) (02/14/22 1639)  Pulse: 87 (02/14/22 1639)  Resp: 18 (02/14/22 1639)  BP: (!) 84/62 (02/14/22 1639)  SpO2: 97 % (02/14/22 1639) Vital Signs (24h Range):  Temp:  [98.2 °F (36.8 °C)-98.8 °F (37.1 °C)] 98.8 °F (37.1 °C)  Pulse:  [] 87  Resp:  [16-18] 18  SpO2:  [96 %-99 %] 97 %  BP: ()/() 84/62     Weight: 62.1 kg (136 lb 14.5 oz)  Body mass index is 25.04 kg/m².        Physical Exam  Constitutional:       Appearance: Normal appearance.   HENT:      Head: Normocephalic.   Neck:      Vascular: No carotid bruit.   Cardiovascular:      Rate and Rhythm: Normal rate and regular rhythm.      Pulses:           Femoral pulses are 1+ on the right side and 1+ on the left side.     Heart sounds: Normal heart sounds.      Comments: osiris's sign negative soham  Pulmonary:      Effort: Pulmonary effort is normal.      Breath sounds: Normal breath sounds.   Abdominal:      Palpations: Abdomen is soft.       Tenderness: There is no abdominal tenderness.   Musculoskeletal:      Right lower le+ Pitting Edema present.      Left lower le+ Pitting Edema present.   Skin:     Capillary Refill: Capillary refill takes less than 2 seconds.   Neurological:      Mental Status: She is alert.         Significant Labs:  CBC:   Recent Labs   Lab 22  0254   WBC 11.01   RBC 5.26   HGB 13.4   HCT 44.3   *   MCV 84   MCH 25.5*   MCHC 30.2*     CMP:   Recent Labs   Lab 22  0254   GLU 98   CALCIUM 9.2   ALBUMIN 3.2*   PROT 7.1      K 3.8   CO2 21*      BUN 32*   CREATININE 1.7*   ALKPHOS 105   ALT 6*   AST 12   BILITOT 1.0     Coagulation:   Recent Labs   Lab 22  1621   INR 1.2   APTT 23.0*       Significant Diagnostics:  I have reviewed all pertinent imaging results/findings within the past 24 hours.    Assessment/Plan:   Patient with extensive bilateral lower extremity DVT, and likely pulmonary embolus.  In my opinion, the risks of venous thrombectomy likely outweigh the benefits in this case, as she is not having significant symptoms in her legs and she is an octogenarian.    Recommend hematology evaluation to work-up for hypercoagulable state.  Patient should be on long-term anticoagulation.    No surgical intervention indicated at this time.  Active Diagnoses:    Diagnosis Date Noted POA    PRINCIPAL PROBLEM:  Acute bilateral deep vein thrombosis (DVT) of femoral veins [I82.413] 2022 Yes     Chronic    Positive blood culture [R78.81] 2022 Yes    CKD (chronic kidney disease), stage III [N18.30] 2022 Yes     Chronic    Non compliance w medication regimen [Z91.14] 2022 Not Applicable     Chronic    Constipation with fecal impaction  [K59.00] 2022 Yes    Type 2 diabetes mellitus, without long-term current use of insulin [E11.9] 2022 Yes     Chronic    Syncope and collapse [R55] 2022 Yes    Abdominal aortic aneurysm (AAA) without rupture [I71.4]  02/14/2022 Yes     Chronic    Thrombocytopenia [D69.6] 02/14/2022 No    Primary hypertension [I10] 02/14/2022 Yes     Chronic    Bilateral pulmonary embolism [I26.99] 04/14/2018 Unknown      Problems Resolved During this Admission:    Diagnosis Date Noted Date Resolved POA    Lactic acid increased [E87.2] 02/14/2022 02/14/2022 Yes    Leucocytosis [D72.829] 02/14/2022 02/14/2022 Yes       Thank you for your consult. I will sign off. Please contact us if you have any additional questions.    Ali Khoobehi, MD  Vascular Surgery  [facility]

## 2022-02-14 NOTE — NURSING
NP called me on the floor to question why lactic or UA was not drawn in ED. I am unaware of why interventions were not performed in ED.

## 2022-02-14 NOTE — NURSING
Patient arrived to room 1117 per tech assist via stretcher. Alert and oriented x4. Denies complaints pain. Will continue to monitor. Bed in lowest position. Side rails up x2.

## 2022-02-14 NOTE — H&P
[facility] Medicine History & Physical Examination   Patient Name: Azul Clifford  MRN: [id number]  Patient Class: IP- Inpatient   Admission Date: 2/13/2022  3:56 PM  Length of Stay: 0  Attending Physician: Vic Feliciano MD  Primary Care Provider: Andrew Francisco Iv, MD  Face-to-Face encounter date: 02/13/2022  Code Status: Full code    Chief Complaint: Loss of Consciousness (Found unresponsive, hypoxic to 60s in hallway)        HISTORY OF PRESENT ILLNESS:   Azul Clifford is a 81 y.o. Black or  female with known history of an, diabetes mellitus, DVT, PE/DVT (noncompliance with anticoagulation), hyperlipidemia who presented with a primary complaint of Loss of Consciousness (Found unresponsive, hypoxic to 60s in hallway)  History was obtained from the patient and collateral obtained from the family present at the bedside and ER physician Sign-out.     States that she did not feel well yesterday, having left leg pain, abdominal pain/constipation.  Also reports mild shortness of breath.  Denies fever, chills, chest pain, nausea, vomiting, diarrhea, urinary symptoms.  Slept a lot yesterday.  Has not been taking medication as prescribed, especially her blood thinner.    Earlier today, she walked to open the door when her daughter got to her house. She walked back and sat on a chair. She complained of being tired and shortly became unresponsive - stop talking and not answering questions. EMS was activated and she subsequently brought to the ED.     Per ED physician, EMS reported that was awake, alert, and oriented with stable vital signs.  She became shortness of breath, tachycardic, with SpO2 in the 60 in the ED hallway  CBC - WBC 15.78, hemoglobin 14.4, hematocrit 45.7, platelet 187  CMP - potassium 3.6, BUN 30, creatinine 2.0, estimated GFR 26, glucose 183  BNP -38  Troponin - 0.032  COVID 19 - Negative  Lactic acid elevated 4.3  EKG - sinus tachycardia, rate 113, left  ventricular hypertrophy, no ST elevation  CXR -  IMPRESSION:  1. No acute pulmonary process.  2. Prominence of the aortic knob suggestive of an aneurysm or dissection, new/worse when compared to the previous exam, consider dedicated contrast-enhanced CT follow-up.  NM lung scan - Intermediate-to high probability for acute PE.  CT Head without contrast - no acute intracranial process  CT cervical spine - no acute fracture or traumatic malalignment the cervical spine  CT head abdomen pelvis -  IMPRESSION:  1. Prominence of the left greater than right lower extremity deep venous vessels from the common iliac to the visualized proximal thigh with mild adjacent fat stranding suspicious for an occult deep venous thrombus and adjacent edema, consider correlation with lower extremity Doppler ultrasound.  2. Fusiform infrarenal abdominal aortic aneurysm measuring 3.4 cm in diameter, there is mild fat stranding along the distal abdominal aorta and iliacs, possibly from underlying deep venous thrombosis, noting that aortic pathology is also a consideration (aortitis, or less likely a leaking aneurysm).  3. 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  4. Mild cardiomegaly and scattered coronary arterial calcifications (3 vessel disease).  5. Large stool ball in the rectal vault, consider correlation for fecal impaction, and/or constipation, with a moderate volume of stool and gas in the remainder the colon.  6. Small nonobstructing left-sided renal stone measuring 2 mm.  7. Sludge in the gallbladder without findings of acute cholecystitis.  8. Numerous additional, and incidental findings as noted above.  US venous LEs -  Extensive DVT bilaterally, occlusive throughout the left lower extremity and occlusive in the right femoral vein.     REVIEW OF SYSTEMS:   10 Point Review of System was performed and was found to be negative except for that mentioned already in the HPI above.     PAST  MEDICAL HISTORY:     Past Medical History:   Diagnosis Date    Arthritis     DM2 (diabetes mellitus, type 2)     Glaucoma capsular     HLD (hyperlipidemia)     Hypertension     Kidney stones     Skin lesion of breast        PAST SURGICAL HISTORY:     Past Surgical History:   Procedure Laterality Date    BREAST SURGERY      removed lump to right breast     EYE SURGERY      URETERAL STENT PLACEMENT      and removed       ALLERGIES:   Patient has no known allergies.    FAMILY HISTORY:     Family History   Problem Relation Age of Onset    Coronary artery disease Mother     Diabetes Mother        SOCIAL HISTORY:     Social History     Tobacco Use    Smoking status: Former Smoker    Smokeless tobacco: Never Used   Substance Use Topics    Alcohol use: No        Social History     Substance and Sexual Activity   Sexual Activity Not on file        HOME MEDICATIONS:     Prior to Admission medications    Medication Sig Start Date End Date Taking? Authorizing Provider   amLODIPine (NORVASC) 10 MG tablet Take 10 mg by mouth once daily. 2/20/21  Yes Historical Provider   apixaban 5 mg Tab Take 1 tablet (5 mg total) by mouth 2 (two) times daily. 4/25/18  Yes Soledad Clancy MD   aspirin (ECOTRIN) 81 MG EC tablet Take 81 mg by mouth once daily.   Yes Historical Provider   cloNIDine (CATAPRES) 0.1 MG tablet Take 0.1 mg by mouth 2 (two) times daily.   Yes Historical Provider   ergocalciferol (ERGOCALCIFEROL) 50,000 unit Cap Take 1,250 mcg by mouth once daily.   Yes Historical Provider   hydroCHLOROthiazide (MICROZIDE) 12.5 mg capsule Take 12.5 mg by mouth once daily. 6/15/20  Yes Historical Provider   metFORMIN (GLUCOPHAGE) 500 MG tablet Take 1 tablet (500 mg total) by mouth 2 (two) times daily with meals. On hold during hospital as of April 18th because of low blood sugars, please reassess with pcp 4/18/18  Yes Soledad Clancy MD   simvastatin (ZOCOR) 40 MG tablet Take 40 mg by mouth once daily. 2/20/21  Yes  Historical Provider   traMADoL (ULTRAM) 50 mg tablet Take 50 mg by mouth 3 (three) times daily as needed. 2/19/21  Yes Historical Provider   TRAVATAN Z 0.004 % Drop 1 drop 2 (two) times daily.  1/10/18  Yes Historical Provider   alcohol antiseptic pads (ALCOHOL PADS TOP) USE TWICE DAILY PRIOR TO FINGER STICK 2/20/21   Historical Provider   alcohol antiseptic pads (ALCOHOL SWABS TOP)  2/19/21   Historical Provider   esomeprazole (NEXIUM) 40 MG capsule Take 40 mg by mouth as needed.  1/10/18   Historical Provider   olmesartan-amLODIPin-hcthiazid 40-10-12.5 mg Tab Take by mouth once daily.  1/10/18   Historical Provider   olmesartan-hydrochlorothiazide (BENICAR HCT) 20-12.5 mg per tablet Take 1 tablet by mouth once daily. 2/20/21   Historical Provider   pantoprazole (PROTONIX) 40 MG tablet Take 40 mg by mouth. 6/13/19   Historical Provider         PHYSICAL EXAM:   BP (!) 158/99 (BP Location: Right arm, Patient Position: Lying)   Pulse 101   Temp 98.6 °F (37 °C) (Oral)   Resp 18   Wt 68 kg (150 lb)   SpO2 98%   BMI 27.44 kg/m²   Vitals Reviewed  General appearance: Well-developed, non/toxic appearing  female in no apparent distress.  Skin: No Rash.   Neuro: Motor and sensory exams grossly intact. Good tone. Power in all 4 extremities 5/5.   HENT: Atraumatic head. Moist mucous membranes of oral cavity.  Eyes: Normal extraocular movements.   Neck: Supple. No evidence of lymphadenopathy. No thyroidomegaly.  Lungs: Clear to auscultation bilaterally. No wheezing present.   Heart: Regular rate and rhythm. S1 and S2 present with no murmurs/gallop/rub. No pedal edema. No JVD present.   Abdomen: Soft, non-distended, non-tender. No rebound tenderness/guarding. No masses or organomegaly. Bowel sounds are normal. Bladder is not palpable.   Extremities: No cyanosis, clubbing.  Psych/mental status: Alert and oriented. Cooperative. Responds appropriately to questions.   EMERGENCY DEPARTMENT LABS AND IMAGING:      Labs Reviewed   CBC W/ AUTO DIFFERENTIAL - Abnormal; Notable for the following components:       Result Value    WBC 15.78 (*)     RBC 5.61 (*)     MCH 25.7 (*)     MCHC 31.5 (*)     RDW 15.1 (*)     Immature Granulocytes 0.8 (*)     Gran # (ANC) 12.4 (*)     Immature Grans (Abs) 0.12 (*)     Gran % 78.2 (*)     Lymph % 14.1 (*)     All other components within normal limits   COMPREHENSIVE METABOLIC PANEL - Abnormal; Notable for the following components:    Glucose 183 (*)     BUN 30 (*)     Creatinine 2.0 (*)     Total Bilirubin 1.3 (*)     eGFR if  26.0 (*)     eGFR if non  22.6 (*)     All other components within normal limits   APTT - Abnormal; Notable for the following components:    aPTT 23.0 (*)     All other components within normal limits   PROTIME-INR - Abnormal; Notable for the following components:    PT 14.1 (*)     All other components within normal limits   LACTIC ACID, PLASMA - Abnormal; Notable for the following components:    Lactate (Lactic Acid) 4.3 (*)     All other components within normal limits    Narrative:     Lactic Acid critical result(s) repeated. Called and verbal readback                   obtained from Sylvie Marcelo RN ER.  by CW1 02/13/2022 17:07   ISTAT CREATININE - Abnormal; Notable for the following components:    POC Creatinine 2.0 (*)     All other components within normal limits   POCT GLUCOSE - Abnormal; Notable for the following components:    POC Glucose 203 (*)     All other components within normal limits   CULTURE, BLOOD   CULTURE, BLOOD   B-TYPE NATRIURETIC PEPTIDE   LIPASE   TROPONIN I   SARS-COV-2 RNA AMPLIFICATION, QUAL   PROCALCITONIN   URINALYSIS, REFLEX TO URINE CULTURE   DRUG SCREEN PANEL, URINE EMERGENCY   LACTIC ACID, PLASMA   LACTIC ACID, PLASMA   POCT GLUCOSE, HAND-HELD DEVICE   TYPE & SCREEN   POCT GLUCOSE MONITORING CONTINUOUS   POCT CREATININE       US Lower Extremity Veins Bilateral   Final Result      CT Cervical Spine  Without Contrast   Final Result      CT Chest Abdomen Pelvis Without Contrast (XPD)   Final Result      CT Head Without Contrast   Final Result      NM Lung Scan Perfusion Particulate   Final Result      X-Ray Chest AP Portable   Final Result      US Carotid Bilateral    (Results Pending)   Results for AZUL CLIFFORD (MRN [id number]) as of 2/13/2022 19:41   Ref. Range 4/13/2018 14:30 4/14/2018 05:35 4/18/2018 06:21 9/8/2021 11:40 2/13/2022 16:21   BUN Latest Ref Range: 8 - 23 mg/dL 19 (H) 22 (H) 23 (H) 17 30 (H)   Creatinine Latest Ref Range: 0.5 - 1.4 mg/dL 1.03 0.98 1.07 2.0 (H) 2.0 (H)   eGFR if non African American Latest Ref Range: >60 mL/min/1.73 m^2 51 (A) 55 (A) 49 (A) 23 (A) 22.6 (A)   eGFR if African American Latest Ref Range: >60 mL/min/1.73 m^2 59 (A) >60 57 (A) 26 (A) 26.0 (A)     Results for AZUL CLIFFORD (MRN [id number]) as of 2/13/2022 19:41   Ref. Range 4/13/2018 14:30 4/14/2018 05:35 4/18/2018 06:21 9/8/2021 11:40 2/13/2022 16:21   BUN Latest Ref Range: 8 - 23 mg/dL 19 (H) 22 (H) 23 (H) 17 30 (H)   Creatinine Latest Ref Range: 0.5 - 1.4 mg/dL 1.03 0.98 1.07 2.0 (H) 2.0 (H)   eGFR if non African American Latest Ref Range: >60 mL/min/1.73 m^2 51 (A) 55 (A) 49 (A) 23 (A) 22.6 (A)   eGFR if African American Latest Ref Range: >60 mL/min/1.73 m^2 59 (A) >60 57 (A) 26 (A) 26.0 (A)     ASSESSMENT & PLAN:   Azul Clifford is a 81 y.o. female admitted for    Syncope  Acute PE  DVT, extensive   Lactic acidosis, afebrile, no obvious sources of infection  Hypertension, uncontrolled  JAMIE VS CKD  Infrarenal abdominal aortic aneurysm  Aneurysmal dilation of the aortic arch  Constipation    Plan  Admit, inpatient, medical-surgical unit with remote telemetry  Continue cardiac monitor for arrhythmia  Echocardiogram  Carotid ultrasound  Lovenox 1 mg/kg subQ in the ED, continue full-dose  Hold apixaban, report not taking on a regular basis  Await for repeat lactic acid  Await for procalcitonin   Await for UA  IV Zosyn and  IV vancomycin started in the ED, will continue for now  Follow blood culture  Continue home medication:  Amlodipine, aspirin, a atorvastatin, clonidine  Hold hydrochlorothiazide and metformin  Monitor blood glucose  Low-dose sliding scale insulin  Monitor renal function  Daily CBC, CMP, magnesium, phosphorus  Replete electrolytes per protocol  Add lactulose for constipation      Diet: Diabetic    DVT Prophylaxis: Sub Q Lovenox for DVT prophylaxis. Encourage ambulation and OOB as tolerated.     Discharge Planning and Disposition:  Patient will be discharged in 2 days.  ________________________________________________________________________________    This patient is high risk for life-threatening deterioration and death secondary to above comorbidities and need for IV treatment. This patient meets inpatient criteria.   Face-to-Face encounter date: 02/13/2022  Encounter included review of the medical records, interviewing and examining the patient face-to-face, discussion with family and other health care providers including emergency medicine physician, admission orders, interpreting lab/test results and formulating a plan of care.   Medical Decision Making during this encounter was  [_] Low Complexity  [_] Moderate Complexity  [x] High Complexity  _________________________________________________________________________________    INPATIENT LIST OF MEDICATIONS     Current Facility-Administered Medications:     acetaminophen tablet 650 mg, 650 mg, Oral, Q8H PRN, [place] P. Harris, APRN    acetaminophen tablet 650 mg, 650 mg, Oral, Q4H PRN, N[place]e P. Harris, APRN    bisacodyL suppository 10 mg, 10 mg, Rectal, Daily PRN, Nant[place]adee P. Harris, APRN    dextrose 50% injection 12.5 g, 12.5 g, Intravenous, PRN, Nant[place]adee P. Harris, APRN    dextrose 50% injection 25 g, 25 g, Intravenous, PRN, DANNY Pleitez    [START ON 2/14/2022] enoxaparin injection 70 mg, 1 mg/kg, Subcutaneous, Q24H, Kecia Santiago MD     glucagon (human recombinant) injection 1 mg, 1 mg, Intramuscular, PRN, Nant[place]adee P. Harris, APRN    glucose chewable tablet 16 g, 16 g, Oral, PRN, Nantawadee P. Harris, APRN    glucose chewable tablet 24 g, 24 g, Oral, PRN, Nant[place]adee P. Harris, APRN    hydrALAZINE injection 10 mg, 10 mg, Intravenous, Q6H PRN, Nant[place]adee P. Harris, APRN    insulin aspart U-100 pen 0-5 Units, 0-5 Units, Subcutaneous, QID (AC + HS) PRN, Nant[place]adee P. Harris, APRN    lactulose 10 gram/15 mL enema solution (inpatient use) 200 g, 200 g, Rectal, BID, Nant[place]adee P. Harris, APRN    magnesium oxide tablet 800 mg, 800 mg, Oral, PRN, Nantawadee P. Harris, APRN    magnesium oxide tablet 800 mg, 800 mg, Oral, PRN, Nantawadee P. Harris, APRN    naloxone 0.4 mg/mL injection 0.02 mg, 0.02 mg, Intravenous, PRN, Nantawadee P. Harris, APRN    ondansetron injection 4 mg, 4 mg, Intravenous, Q8H PRN, Nantawadee P. Harris, APRN    [START ON 2/14/2022] piperacillin-tazobactam 4.5 g in dextrose 5 % 100 mL IVPB (ready to mix system), 4.5 g, Intravenous, Q12H, Nantawadee P. Harris, APRN    potassium bicarbonate disintegrating tablet 35 mEq, 35 mEq, Oral, PRN, Nantawadee P. Harris, APRN    potassium bicarbonate disintegrating tablet 50 mEq, 50 mEq, Oral, PRN, Nantawadee P. Harris, APRN    potassium bicarbonate disintegrating tablet 60 mEq, 60 mEq, Oral, PRN, Nantawadee P. Harris, APRN    potassium, sodium phosphates 280-160-250 mg packet 2 packet, 2 packet, Oral, PRN, Nantawadee P. Harris, APRN    potassium, sodium phosphates 280-160-250 mg packet 2 packet, 2 packet, Oral, PRN, Nant[place]adee P. Harris, APRN    potassium, sodium phosphates 280-160-250 mg packet 2 packet, 2 packet, Oral, PRN, DANNY Pleitez    sodium chloride 0.9% flush 10 mL, 10 mL, Intravenous, PRN, Steven Iglesias, DANNY    Pharmacy to dose Vancomycin consult, , , Once **AND** vancomycin - pharmacy to dose, , Intravenous, pharmacy to manage frequency, Kecia Santiago MD      Scheduled Meds:   [START ON  2/14/2022] enoxaparin  1 mg/kg Subcutaneous Q24H    lactulose  200 g Rectal BID    [START ON 2/14/2022] piperacillin-tazobactam (ZOSYN) IVPB  4.5 g Intravenous Q12H     Continuous Infusions:  PRN Meds:.acetaminophen, acetaminophen, bisacodyL, dextrose 50%, dextrose 50%, glucagon (human recombinant), glucose, glucose, hydrALAZINE, insulin aspart U-100, magnesium oxide, magnesium oxide, naloxone, ondansetron, potassium bicarbonate, potassium bicarbonate, potassium bicarbonate, potassium, sodium phosphates, potassium, sodium phosphates, potassium, sodium phosphates, sodium chloride 0.9%, Pharmacy to dose Vancomycin consult **AND** vancomycin - pharmacy to dose      Steven Iglesias  Acute Care Nurse Practitioner  Hospitalist Service  02/13/2022

## 2022-02-14 NOTE — HPI
Azul Clifford is a 81 y.o. Black or  female with known history of an, diabetes mellitus, DVT, PE/DVT (noncompliance with anticoagulation), hyperlipidemia who presented with a primary complaint of Loss of Consciousness (Found unresponsive, hypoxic to 60s in hallway)  History was obtained from the patient and collateral obtained from the family present at the bedside and ER physician Sign-out.      States that she did not feel well yesterday, having left leg pain, abdominal pain/constipation.  Also reports mild shortness of breath.  Denies fever, chills, chest pain, nausea, vomiting, diarrhea, urinary symptoms.  Slept a lot yesterday.  Has not been taking medication as prescribed, especially her blood thinner.     Earlier today, she walked to open the door when her daughter got to her house. She walked back and sat on a chair. She complained of being tired and shortly became unresponsive - stop talking and not answering questions. EMS was activated and she subsequently brought to the ED.      Per ED physician, EMS reported that was awake, alert, and oriented with stable vital signs.  She became shortness of breath, tachycardic, with SpO2 in the 60 in the ED hallway  CBC - WBC 15.78, hemoglobin 14.4, hematocrit 45.7, platelet 187  CMP - potassium 3.6, BUN 30, creatinine 2.0, estimated GFR 26, glucose 183  BNP -38  Troponin - 0.032  COVID 19 - Negative  Lactic acid elevated 4.3  EKG - sinus tachycardia, rate 113, left ventricular hypertrophy, no ST elevation  CXR -  IMPRESSION:  1. No acute pulmonary process.  2. Prominence of the aortic knob suggestive of an aneurysm or dissection, new/worse when compared to the previous exam, consider dedicated contrast-enhanced CT follow-up.  NM lung scan - Intermediate-to high probability for acute PE.  CT Head without contrast - no acute intracranial process  CT cervical spine - no acute fracture or traumatic malalignment the cervical spine  CT head abdomen pelvis  -  IMPRESSION:  1. Prominence of the left greater than right lower extremity deep venous vessels from the common iliac to the visualized proximal thigh with mild adjacent fat stranding suspicious for an occult deep venous thrombus and adjacent edema, consider correlation with lower extremity Doppler ultrasound.  2. Fusiform infrarenal abdominal aortic aneurysm measuring 3.4 cm in diameter, there is mild fat stranding along the distal abdominal aorta and iliacs, possibly from underlying deep venous thrombosis, noting that aortic pathology is also a consideration (aortitis, or less likely a leaking aneurysm).  3. 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  4. Mild cardiomegaly and scattered coronary arterial calcifications (3 vessel disease).  5. Large stool ball in the rectal vault, consider correlation for fecal impaction, and/or constipation, with a moderate volume of stool and gas in the remainder the colon.  6. Small nonobstructing left-sided renal stone measuring 2 mm.  7. Sludge in the gallbladder without findings of acute cholecystitis.  8. Numerous additional, and incidental findings as noted above.  US venous LEs -  Extensive DVT bilaterally, occlusive throughout the left lower extremity and occlusive in the right femoral vein.

## 2022-02-15 LAB
ALBUMIN SERPL BCP-MCNC: 2.8 G/DL (ref 3.5–5.2)
ALP SERPL-CCNC: 94 U/L (ref 55–135)
ALT SERPL W/O P-5'-P-CCNC: 11 U/L (ref 10–44)
ANION GAP SERPL CALC-SCNC: 13 MMOL/L (ref 8–16)
AST SERPL-CCNC: 19 U/L (ref 10–40)
BASOPHILS # BLD AUTO: 0.06 K/UL (ref 0–0.2)
BASOPHILS NFR BLD: 0.5 % (ref 0–1.9)
BILIRUB SERPL-MCNC: 1.1 MG/DL (ref 0.1–1)
BUN SERPL-MCNC: 36 MG/DL (ref 8–23)
CALCIUM SERPL-MCNC: 9 MG/DL (ref 8.7–10.5)
CHLORIDE SERPL-SCNC: 103 MMOL/L (ref 95–110)
CO2 SERPL-SCNC: 22 MMOL/L (ref 23–29)
CREAT SERPL-MCNC: 1.7 MG/DL (ref 0.5–1.4)
DIFFERENTIAL METHOD: ABNORMAL
EOSINOPHIL # BLD AUTO: 0.2 K/UL (ref 0–0.5)
EOSINOPHIL NFR BLD: 1.4 % (ref 0–8)
ERYTHROCYTE [DISTWIDTH] IN BLOOD BY AUTOMATED COUNT: 15 % (ref 11.5–14.5)
EST. GFR  (AFRICAN AMERICAN): 31.7 ML/MIN/1.73 M^2
EST. GFR  (NON AFRICAN AMERICAN): 27.5 ML/MIN/1.73 M^2
GLUCOSE SERPL-MCNC: 106 MG/DL (ref 70–110)
GLUCOSE SERPL-MCNC: 106 MG/DL (ref 70–110)
GLUCOSE SERPL-MCNC: 109 MG/DL (ref 70–110)
GLUCOSE SERPL-MCNC: 111 MG/DL (ref 70–110)
GLUCOSE SERPL-MCNC: 97 MG/DL (ref 70–110)
HCT VFR BLD AUTO: 38.9 % (ref 37–48.5)
HGB BLD-MCNC: 11.9 G/DL (ref 12–16)
IMM GRANULOCYTES # BLD AUTO: 0.07 K/UL (ref 0–0.04)
IMM GRANULOCYTES NFR BLD AUTO: 0.6 % (ref 0–0.5)
LYMPHOCYTES # BLD AUTO: 1.9 K/UL (ref 1–4.8)
LYMPHOCYTES NFR BLD: 16.4 % (ref 18–48)
MAGNESIUM SERPL-MCNC: 2.1 MG/DL (ref 1.6–2.6)
MCH RBC QN AUTO: 25.2 PG (ref 27–31)
MCHC RBC AUTO-ENTMCNC: 30.6 G/DL (ref 32–36)
MCV RBC AUTO: 82 FL (ref 82–98)
MONOCYTES # BLD AUTO: 1 K/UL (ref 0.3–1)
MONOCYTES NFR BLD: 8.4 % (ref 4–15)
NEUTROPHILS # BLD AUTO: 8.4 K/UL (ref 1.8–7.7)
NEUTROPHILS NFR BLD: 72.7 % (ref 38–73)
NRBC BLD-RTO: 0 /100 WBC
PHOSPHATE SERPL-MCNC: 3.1 MG/DL (ref 2.7–4.5)
PLATELET # BLD AUTO: 173 K/UL (ref 150–450)
PMV BLD AUTO: 10.4 FL (ref 9.2–12.9)
POTASSIUM SERPL-SCNC: 3.1 MMOL/L (ref 3.5–5.1)
PROT SERPL-MCNC: 6.4 G/DL (ref 6–8.4)
RBC # BLD AUTO: 4.72 M/UL (ref 4–5.4)
SODIUM SERPL-SCNC: 138 MMOL/L (ref 136–145)
VANCOMYCIN SERPL-MCNC: 16.2 UG/ML
WBC # BLD AUTO: 11.52 K/UL (ref 3.9–12.7)

## 2022-02-15 PROCEDURE — 36415 COLL VENOUS BLD VENIPUNCTURE: CPT | Performed by: NURSE PRACTITIONER

## 2022-02-15 PROCEDURE — 83735 ASSAY OF MAGNESIUM: CPT | Performed by: NURSE PRACTITIONER

## 2022-02-15 PROCEDURE — 84100 ASSAY OF PHOSPHORUS: CPT | Performed by: NURSE PRACTITIONER

## 2022-02-15 PROCEDURE — 80053 COMPREHEN METABOLIC PANEL: CPT | Performed by: NURSE PRACTITIONER

## 2022-02-15 PROCEDURE — 85025 COMPLETE CBC W/AUTO DIFF WBC: CPT | Performed by: NURSE PRACTITIONER

## 2022-02-15 PROCEDURE — 25000003 PHARM REV CODE 250: Performed by: NURSE PRACTITIONER

## 2022-02-15 PROCEDURE — 63600175 PHARM REV CODE 636 W HCPCS: Performed by: INTERNAL MEDICINE

## 2022-02-15 PROCEDURE — 80202 ASSAY OF VANCOMYCIN: CPT | Performed by: INTERNAL MEDICINE

## 2022-02-15 PROCEDURE — 25000003 PHARM REV CODE 250: Performed by: INTERNAL MEDICINE

## 2022-02-15 PROCEDURE — 63600175 PHARM REV CODE 636 W HCPCS: Performed by: EMERGENCY MEDICINE

## 2022-02-15 PROCEDURE — 36415 COLL VENOUS BLD VENIPUNCTURE: CPT | Performed by: INTERNAL MEDICINE

## 2022-02-15 PROCEDURE — 99232 PR SUBSEQUENT HOSPITAL CARE,LEVL II: ICD-10-PCS | Mod: ,,, | Performed by: INTERNAL MEDICINE

## 2022-02-15 PROCEDURE — 12000002 HC ACUTE/MED SURGE SEMI-PRIVATE ROOM

## 2022-02-15 PROCEDURE — 99232 SBSQ HOSP IP/OBS MODERATE 35: CPT | Mod: ,,, | Performed by: INTERNAL MEDICINE

## 2022-02-15 RX ADMIN — LATANOPROST 1 DROP: 50 SOLUTION OPHTHALMIC at 08:02

## 2022-02-15 RX ADMIN — VANCOMYCIN HYDROCHLORIDE 1000 MG: 1 INJECTION, POWDER, LYOPHILIZED, FOR SOLUTION INTRAVENOUS at 01:02

## 2022-02-15 RX ADMIN — Medication: at 11:02

## 2022-02-15 RX ADMIN — ATORVASTATIN CALCIUM 40 MG: 40 TABLET, FILM COATED ORAL at 08:02

## 2022-02-15 RX ADMIN — ENOXAPARIN SODIUM 70 MG: 80 INJECTION SUBCUTANEOUS at 08:02

## 2022-02-15 RX ADMIN — ACETAMINOPHEN 650 MG: 325 TABLET, FILM COATED ORAL at 10:02

## 2022-02-15 RX ADMIN — ACETAMINOPHEN 650 MG: 325 TABLET, FILM COATED ORAL at 04:02

## 2022-02-15 RX ADMIN — PIPERACILLIN SODIUM AND TAZOBACTAM SODIUM 3.38 G: 3; .375 INJECTION, POWDER, LYOPHILIZED, FOR SOLUTION INTRAVENOUS at 04:02

## 2022-02-15 RX ADMIN — ASPIRIN 81 MG: 81 TABLET, DELAYED RELEASE ORAL at 10:02

## 2022-02-15 RX ADMIN — PIPERACILLIN SODIUM AND TAZOBACTAM SODIUM 3.38 G: 3; .375 INJECTION, POWDER, LYOPHILIZED, FOR SOLUTION INTRAVENOUS at 08:02

## 2022-02-15 RX ADMIN — PIPERACILLIN SODIUM AND TAZOBACTAM SODIUM 3.38 G: 3; .375 INJECTION, POWDER, LYOPHILIZED, FOR SOLUTION INTRAVENOUS at 11:02

## 2022-02-15 NOTE — PLAN OF CARE
Problem: Adult Inpatient Plan of Care  Goal: Plan of Care Review  Outcome: Ongoing, Progressing  Goal: Patient-Specific Goal (Individualized)  Outcome: Ongoing, Progressing  Goal: Absence of Hospital-Acquired Illness or Injury  Outcome: Ongoing, Progressing  Goal: Optimal Comfort and Wellbeing  Outcome: Ongoing, Progressing  Goal: Readiness for Transition of Care  Outcome: Ongoing, Progressing     Problem: Diabetes Comorbidity  Goal: Blood Glucose Level Within Targeted Range  Outcome: Ongoing, Progressing     Problem: Fall Injury Risk  Goal: Absence of Fall and Fall-Related Injury  Outcome: Ongoing, Progressing     Problem: Skin Injury Risk Increased  Goal: Skin Health and Integrity  Outcome: Ongoing, Progressing

## 2022-02-15 NOTE — PLAN OF CARE
Important Message from Medicare was sign, explained and given to patient/caregiver on 02/15/2022 at 8:37am     addressed any questions or concerns.    Important Message from Medicare document will be scanned into patient's medical record

## 2022-02-15 NOTE — NURSING
SBAR received. Patient lying in bed, eyes closed. Easily aroused. Resp even and unlabored. NAD noted. Family noted at bedside. Bed locked in low position with SR x 2. Call light in reach. Will continue to monitor.

## 2022-02-15 NOTE — ASSESSMENT & PLAN NOTE
Ms. Clifford is doing ok today.  She states that she was taking Elqiuis but is not sure when her last dose was.  She is not an entirely good historian.  Given this, I would transition her to Pradaxa prior to dischage and will have her back with me in the office in the next four to six weeks.

## 2022-02-15 NOTE — PROGRESS NOTES
[facility]  Hematology/Oncology  Progress Note    Patient Name: Azul Clifford  Admission Date: 2/13/2022  Hospital Length of Stay: 2 days  Code Status: Full Code     Subjective:     HPI:  Ms. Clifford has no new complaints at this time.  No sob or pain currently.       Interval History:       Oncology Treatment Plan:   [No treatment plan]    Medications:  Continuous Infusions:  Scheduled Meds:   aspirin  81 mg Oral Daily    atorvastatin  40 mg Oral QHS    enoxaparin  1 mg/kg Subcutaneous Q24H    latanoprost  1 drop Both Eyes QHS    piperacillin-tazobactam (ZOSYN) IVPB  3.375 g Intravenous Q8H    polyethylene glycol  17 g Oral Daily    white petrolatum   Topical (Top) Daily     PRN Meds:acetaminophen, acetaminophen, bisacodyL, dextrose 50%, dextrose 50%, glucagon (human recombinant), glucose, glucose, hydrALAZINE, insulin aspart U-100, magnesium oxide, magnesium oxide, naloxone, ondansetron, potassium bicarbonate, potassium bicarbonate, potassium bicarbonate, potassium, sodium phosphates, potassium, sodium phosphates, potassium, sodium phosphates, sodium chloride 0.9%, traMADoL, Pharmacy to dose Vancomycin consult **AND** vancomycin - pharmacy to dose     Review of Systems   Constitutional: Negative for fever.   Respiratory: Negative for shortness of breath.    Cardiovascular: Negative for chest pain and leg swelling.   Gastrointestinal: Negative for abdominal pain and blood in stool.   Genitourinary: Negative for hematuria.   Skin: Negative for rash.     Objective:     Vital Signs (Most Recent):  Temp: 97.9 °F (36.6 °C) (02/15/22 1100)  Pulse: 86 (02/15/22 1100)  Resp: 18 (02/15/22 1100)  BP: 119/77 (02/15/22 1100)  SpO2: 96 % (02/15/22 1100) Vital Signs (24h Range):  Temp:  [97.7 °F (36.5 °C)-98.8 °F (37.1 °C)] 97.9 °F (36.6 °C)  Pulse:  [59-88] 86  Resp:  [16-18] 18  SpO2:  [96 %-100 %] 96 %  BP: ()/(62-83) 119/77     Weight: 62.1 kg (136 lb 14.5 oz)  Body mass index is 25.04  kg/m².  Body surface area is 1.65 meters squared.      Intake/Output Summary (Last 24 hours) at 2/15/2022 1407  Last data filed at 2/15/2022 0412  Gross per 24 hour   Intake 50 ml   Output --   Net 50 ml       Physical Exam  Constitutional:       Appearance: Normal appearance.   HENT:      Head: Normocephalic and atraumatic.      Right Ear: External ear normal.      Left Ear: External ear normal.   Eyes:      Conjunctiva/sclera: Conjunctivae normal.   Cardiovascular:      Rate and Rhythm: Normal rate and regular rhythm.      Heart sounds: Normal heart sounds.   Pulmonary:      Effort: Pulmonary effort is normal.      Breath sounds: Normal breath sounds.   Abdominal:      General: Abdomen is flat.      Palpations: Abdomen is soft.         Significant Labs:   CBC:   Recent Labs   Lab 02/13/22  1621 02/14/22  0254 02/15/22  0521   WBC 15.78* 11.01 11.52   HGB 14.4 13.4 11.9*   HCT 45.7 44.3 38.9    125* 173    and CMP:   Recent Labs   Lab 02/13/22 1621 02/14/22  0254 02/15/22  0521    140 138   K 3.6 3.8 3.1*    104 103   CO2 23 21* 22*   * 98 97   BUN 30* 32* 36*   CREATININE 2.0* 1.7* 1.7*   CALCIUM 9.9 9.2 9.0   PROT 7.6 7.1 6.4   ALBUMIN 3.6 3.2* 2.8*   BILITOT 1.3* 1.0 1.1*   ALKPHOS 129 105 94   AST 16 12 19   ALT 10 6* 11   ANIONGAP 16 15 13   EGFRNONAA 22.6* 27.5* 27.5*       Diagnostic Results:  None    Assessment/Plan:     CKD (chronic kidney disease), stage III  Creatinine appears stable at 1.7 today.  Will continue to monitor this now and as OP.      Bilateral pulmonary embolism  Ms. Clifford is doing ok today.  She states that she was taking Elqiuis but is not sure when her last dose was.  She is not an entirely good historian.  Given this, I would transition her to Pradaxa prior to dischage and will have her back with me in the office in the next four to six weeks.          Thank you for your consult. I will follow-up with patient. Please contact us if you have any additional  questions.     Storm Velazquez MD  Hematology/Oncology  [facility]

## 2022-02-15 NOTE — SUBJECTIVE & OBJECTIVE
Interval History:       Oncology Treatment Plan:   [No treatment plan]    Medications:  Continuous Infusions:  Scheduled Meds:   aspirin  81 mg Oral Daily    atorvastatin  40 mg Oral QHS    enoxaparin  1 mg/kg Subcutaneous Q24H    latanoprost  1 drop Both Eyes QHS    piperacillin-tazobactam (ZOSYN) IVPB  3.375 g Intravenous Q8H    polyethylene glycol  17 g Oral Daily    white petrolatum   Topical (Top) Daily     PRN Meds:acetaminophen, acetaminophen, bisacodyL, dextrose 50%, dextrose 50%, glucagon (human recombinant), glucose, glucose, hydrALAZINE, insulin aspart U-100, magnesium oxide, magnesium oxide, naloxone, ondansetron, potassium bicarbonate, potassium bicarbonate, potassium bicarbonate, potassium, sodium phosphates, potassium, sodium phosphates, potassium, sodium phosphates, sodium chloride 0.9%, traMADoL, Pharmacy to dose Vancomycin consult **AND** vancomycin - pharmacy to dose     Review of Systems   Constitutional: Negative for fever.   Respiratory: Negative for shortness of breath.    Cardiovascular: Negative for chest pain and leg swelling.   Gastrointestinal: Negative for abdominal pain and blood in stool.   Genitourinary: Negative for hematuria.   Skin: Negative for rash.     Objective:     Vital Signs (Most Recent):  Temp: 97.9 °F (36.6 °C) (02/15/22 1100)  Pulse: 86 (02/15/22 1100)  Resp: 18 (02/15/22 1100)  BP: 119/77 (02/15/22 1100)  SpO2: 96 % (02/15/22 1100) Vital Signs (24h Range):  Temp:  [97.7 °F (36.5 °C)-98.8 °F (37.1 °C)] 97.9 °F (36.6 °C)  Pulse:  [59-88] 86  Resp:  [16-18] 18  SpO2:  [96 %-100 %] 96 %  BP: ()/(62-83) 119/77     Weight: 62.1 kg (136 lb 14.5 oz)  Body mass index is 25.04 kg/m².  Body surface area is 1.65 meters squared.      Intake/Output Summary (Last 24 hours) at 2/15/2022 1407  Last data filed at 2/15/2022 0412  Gross per 24 hour   Intake 50 ml   Output --   Net 50 ml       Physical Exam  Constitutional:       Appearance: Normal appearance.   HENT:       Head: Normocephalic and atraumatic.      Right Ear: External ear normal.      Left Ear: External ear normal.   Eyes:      Conjunctiva/sclera: Conjunctivae normal.   Cardiovascular:      Rate and Rhythm: Normal rate and regular rhythm.      Heart sounds: Normal heart sounds.   Pulmonary:      Effort: Pulmonary effort is normal.      Breath sounds: Normal breath sounds.   Abdominal:      General: Abdomen is flat.      Palpations: Abdomen is soft.         Significant Labs:   CBC:   Recent Labs   Lab 02/13/22  1621 02/14/22  0254 02/15/22  0521   WBC 15.78* 11.01 11.52   HGB 14.4 13.4 11.9*   HCT 45.7 44.3 38.9    125* 173    and CMP:   Recent Labs   Lab 02/13/22  1621 02/14/22  0254 02/15/22  0521    140 138   K 3.6 3.8 3.1*    104 103   CO2 23 21* 22*   * 98 97   BUN 30* 32* 36*   CREATININE 2.0* 1.7* 1.7*   CALCIUM 9.9 9.2 9.0   PROT 7.6 7.1 6.4   ALBUMIN 3.6 3.2* 2.8*   BILITOT 1.3* 1.0 1.1*   ALKPHOS 129 105 94   AST 16 12 19   ALT 10 6* 11   ANIONGAP 16 15 13   EGFRNONAA 22.6* 27.5* 27.5*       Diagnostic Results:  None

## 2022-02-15 NOTE — PROGRESS NOTES
"VANCOMYCIN PHARMACOKINETIC NOTE:  Vancomycin Day #2    Objective:    81 y.o., female, Actual Body Weight = 62.1 kg (136 lb 14.5 oz)    Diagnosis/Indication for Vancomycin:  Bacteremia   Desired Vancomycin Peak:  35-40 mcg/ml; Desired Trough: 15-20 mcg/ml    Current Vancomycin Regimen:  1500mg x1 intermittent dosing     Antibiotics (From admission, onward)                Start     Stop Route Frequency Ordered    02/14/22 2300  vancomycin in dextrose 5 % 1 gram/250 mL IVPB 1,000 mg         -- IV Once 02/14/22 2227 02/14/22 2000  piperacillin-tazobactam 3.375 g in dextrose 5 % 50 mL IVPB (ready to mix system)        Note to Pharmacy: Ht: 5' 2" (1.575 m)  Wt: 62.1 kg (136 lb 14.5 oz)  Estimated Creatinine Clearance: 21.7 mL/min (A) (based on SCr of 1.7 mg/dL (H)).   Body mass index is 25.04 kg/m².    -- IV Every 8 hours (non-standard times) 02/14/22 1802 02/14/22 1121  vancomycin - pharmacy to dose  (vancomycin IVPB)        "And" Linked Group Details    -- IV pharmacy to manage frequency 02/14/22 1021             The patient has the following labs:     2/14/2022 Estimated Creatinine Clearance: 21.7 mL/min (A) (based on SCr of 1.7 mg/dL (H)). Lab Results   Component Value Date    BUN 32 (H) 02/14/2022     Lab Results   Component Value Date    WBC 11.01 02/14/2022        Random vancomycin:  12.4 mcg/mL collected after dose #1    Microbiology Results (last 7 days)       Procedure Component Value Units Date/Time    Blood culture #1 **CANNOT BE ORDERED STAT** [[id number]] Collected: 02/13/22 1621    Order Status: Completed Specimen: Blood from Peripheral, Forearm, Right Updated: 02/14/22 1832     Blood Culture, Routine No Growth to date      No Growth to date    Urine culture [[id number]] Collected: 02/14/22 0935    Order Status: No result Specimen: Urine Updated: 02/14/22 1022    Blood culture #2 **CANNOT BE ORDERED STAT** [[id number]] Collected: 02/13/22 1702    Order Status: Completed Specimen: Blood from Peripheral, " Forearm, Right Updated: 02/14/22 1002     Blood Culture, Routine Gram stain aer bottle: Gram positive cocci      Results called to and read back by:Corrie Currie RN-[address] [place] [address];        02/14/2022  10:02 CJD             Assessment:    Renal function is: stable but poor  Vancomycin elimination is dependent on dialysis.  Vancomycin trough is below goal range.    Plan:  Will add vancomycin to 1000 mg IV x1 for now  Will obtain random vancomycin level 2/15/2022 at/with 22:00    Pharmacy will continue to manage vancomycin therapy and adjust regimen as necessary.    Thank you for allowing us to participate in this patient's care.     Sandoval Quintero 2/14/2022 10:29 PM  Department of Pharmacy  Ext [phone]

## 2022-02-15 NOTE — NURSING
Dr Feliciano notified blood culture critical for gram negative coccal bacillus. Will continue to monitor.

## 2022-02-16 LAB
ALBUMIN SERPL BCP-MCNC: 2.9 G/DL (ref 3.5–5.2)
ALP SERPL-CCNC: 98 U/L (ref 55–135)
ALT SERPL W/O P-5'-P-CCNC: 13 U/L (ref 10–44)
ANION GAP SERPL CALC-SCNC: 11 MMOL/L (ref 8–16)
AST SERPL-CCNC: 18 U/L (ref 10–40)
BASOPHILS # BLD AUTO: 0.09 K/UL (ref 0–0.2)
BASOPHILS NFR BLD: 0.9 % (ref 0–1.9)
BILIRUB SERPL-MCNC: 1 MG/DL (ref 0.1–1)
BUN SERPL-MCNC: 33 MG/DL (ref 8–23)
CALCIUM SERPL-MCNC: 9.4 MG/DL (ref 8.7–10.5)
CHLORIDE SERPL-SCNC: 104 MMOL/L (ref 95–110)
CO2 SERPL-SCNC: 22 MMOL/L (ref 23–29)
CREAT SERPL-MCNC: 1.6 MG/DL (ref 0.5–1.4)
DIFFERENTIAL METHOD: ABNORMAL
EOSINOPHIL # BLD AUTO: 0.1 K/UL (ref 0–0.5)
EOSINOPHIL NFR BLD: 1 % (ref 0–8)
ERYTHROCYTE [DISTWIDTH] IN BLOOD BY AUTOMATED COUNT: 15.1 % (ref 11.5–14.5)
EST. GFR  (AFRICAN AMERICAN): 34.1 ML/MIN/1.73 M^2
EST. GFR  (NON AFRICAN AMERICAN): 29.6 ML/MIN/1.73 M^2
GLUCOSE SERPL-MCNC: 104 MG/DL (ref 70–110)
GLUCOSE SERPL-MCNC: 106 MG/DL (ref 70–110)
GLUCOSE SERPL-MCNC: 71 MG/DL (ref 70–110)
GLUCOSE SERPL-MCNC: 83 MG/DL (ref 70–110)
GLUCOSE SERPL-MCNC: 92 MG/DL (ref 70–110)
HCT VFR BLD AUTO: 39 % (ref 37–48.5)
HGB BLD-MCNC: 12.3 G/DL (ref 12–16)
IMM GRANULOCYTES # BLD AUTO: 0.08 K/UL (ref 0–0.04)
IMM GRANULOCYTES NFR BLD AUTO: 0.8 % (ref 0–0.5)
LYMPHOCYTES # BLD AUTO: 1.7 K/UL (ref 1–4.8)
LYMPHOCYTES NFR BLD: 16.7 % (ref 18–48)
MAGNESIUM SERPL-MCNC: 2.2 MG/DL (ref 1.6–2.6)
MCH RBC QN AUTO: 25.1 PG (ref 27–31)
MCHC RBC AUTO-ENTMCNC: 31.5 G/DL (ref 32–36)
MCV RBC AUTO: 79 FL (ref 82–98)
MONOCYTES # BLD AUTO: 0.8 K/UL (ref 0.3–1)
MONOCYTES NFR BLD: 7.6 % (ref 4–15)
NEUTROPHILS # BLD AUTO: 7.3 K/UL (ref 1.8–7.7)
NEUTROPHILS NFR BLD: 73 % (ref 38–73)
NRBC BLD-RTO: 0 /100 WBC
PHOSPHATE SERPL-MCNC: 3 MG/DL (ref 2.7–4.5)
PLATELET # BLD AUTO: 213 K/UL (ref 150–450)
PMV BLD AUTO: 10.4 FL (ref 9.2–12.9)
POTASSIUM SERPL-SCNC: 3.5 MMOL/L (ref 3.5–5.1)
PROT SERPL-MCNC: 6.6 G/DL (ref 6–8.4)
RBC # BLD AUTO: 4.91 M/UL (ref 4–5.4)
SODIUM SERPL-SCNC: 137 MMOL/L (ref 136–145)
WBC # BLD AUTO: 9.93 K/UL (ref 3.9–12.7)

## 2022-02-16 PROCEDURE — 25000003 PHARM REV CODE 250: Performed by: NURSE PRACTITIONER

## 2022-02-16 PROCEDURE — 85025 COMPLETE CBC W/AUTO DIFF WBC: CPT | Performed by: NURSE PRACTITIONER

## 2022-02-16 PROCEDURE — 82962 GLUCOSE BLOOD TEST: CPT

## 2022-02-16 PROCEDURE — 25000003 PHARM REV CODE 250: Performed by: INTERNAL MEDICINE

## 2022-02-16 PROCEDURE — 36415 COLL VENOUS BLD VENIPUNCTURE: CPT | Performed by: NURSE PRACTITIONER

## 2022-02-16 PROCEDURE — 63600175 PHARM REV CODE 636 W HCPCS: Performed by: INTERNAL MEDICINE

## 2022-02-16 PROCEDURE — 97165 OT EVAL LOW COMPLEX 30 MIN: CPT

## 2022-02-16 PROCEDURE — 12000002 HC ACUTE/MED SURGE SEMI-PRIVATE ROOM

## 2022-02-16 PROCEDURE — 84100 ASSAY OF PHOSPHORUS: CPT | Performed by: NURSE PRACTITIONER

## 2022-02-16 PROCEDURE — 80053 COMPREHEN METABOLIC PANEL: CPT | Performed by: NURSE PRACTITIONER

## 2022-02-16 PROCEDURE — 83735 ASSAY OF MAGNESIUM: CPT | Performed by: NURSE PRACTITIONER

## 2022-02-16 PROCEDURE — 97535 SELF CARE MNGMENT TRAINING: CPT

## 2022-02-16 PROCEDURE — 97161 PT EVAL LOW COMPLEX 20 MIN: CPT

## 2022-02-16 RX ORDER — VANCOMYCIN HCL IN 5 % DEXTROSE 1G/250ML
1000 PLASTIC BAG, INJECTION (ML) INTRAVENOUS ONCE
Status: COMPLETED | OUTPATIENT
Start: 2022-02-16 | End: 2022-02-16

## 2022-02-16 RX ORDER — ENOXAPARIN SODIUM 100 MG/ML
60 INJECTION SUBCUTANEOUS
Status: DISCONTINUED | OUTPATIENT
Start: 2022-02-16 | End: 2022-02-16

## 2022-02-16 RX ORDER — DABIGATRAN ETEXILATE 75 MG/1
75 CAPSULE ORAL 2 TIMES DAILY
Status: DISCONTINUED | OUTPATIENT
Start: 2022-02-16 | End: 2022-02-18 | Stop reason: HOSPADM

## 2022-02-16 RX ADMIN — Medication: at 09:02

## 2022-02-16 RX ADMIN — DABIGATRAN ETEXILATE MESYLATE 75 MG: 75 CAPSULE ORAL at 08:02

## 2022-02-16 RX ADMIN — TRAMADOL HYDROCHLORIDE 50 MG: 50 TABLET, COATED ORAL at 08:02

## 2022-02-16 RX ADMIN — TRAMADOL HYDROCHLORIDE 50 MG: 50 TABLET, COATED ORAL at 12:02

## 2022-02-16 RX ADMIN — POLYETHYLENE GLYCOL 3350 17 G: 17 POWDER, FOR SOLUTION ORAL at 09:02

## 2022-02-16 RX ADMIN — PIPERACILLIN SODIUM AND TAZOBACTAM SODIUM 3.38 G: 3; .375 INJECTION, POWDER, LYOPHILIZED, FOR SOLUTION INTRAVENOUS at 03:02

## 2022-02-16 RX ADMIN — LATANOPROST 1 DROP: 50 SOLUTION OPHTHALMIC at 09:02

## 2022-02-16 RX ADMIN — DABIGATRAN ETEXILATE MESYLATE 75 MG: 75 CAPSULE ORAL at 11:02

## 2022-02-16 RX ADMIN — PIPERACILLIN SODIUM AND TAZOBACTAM SODIUM 3.38 G: 3; .375 INJECTION, POWDER, LYOPHILIZED, FOR SOLUTION INTRAVENOUS at 08:02

## 2022-02-16 RX ADMIN — ASPIRIN 81 MG: 81 TABLET, DELAYED RELEASE ORAL at 10:02

## 2022-02-16 RX ADMIN — ATORVASTATIN CALCIUM 40 MG: 40 TABLET, FILM COATED ORAL at 08:02

## 2022-02-16 RX ADMIN — VANCOMYCIN HYDROCHLORIDE 1000 MG: 1 INJECTION, POWDER, LYOPHILIZED, FOR SOLUTION INTRAVENOUS at 11:02

## 2022-02-16 RX ADMIN — PIPERACILLIN SODIUM AND TAZOBACTAM SODIUM 3.38 G: 3; .375 INJECTION, POWDER, LYOPHILIZED, FOR SOLUTION INTRAVENOUS at 12:02

## 2022-02-16 NOTE — PT/OT/SLP EVAL
Physical Therapy Evaluation    Patient Name:  Azul Clifford   MRN:  [id number]    Recommendations:     Discharge Recommendations:  home health PT   Discharge Equipment Recommendations:  (TBD)   Barriers to discharge: None    Assessment:     Azul Clifford is a 81 y.o. female admitted with a medical diagnosis of Acute bilateral deep vein thrombosis (DVT) of femoral veins.  She presents with the following impairments/functional limitations:  weakness,impaired endurance,impaired self care skills,impaired functional mobilty,gait instability,impaired balance,decreased lower extremity function,pain.    Pt lying supine upon PT entry to room, agreeable to PT evaluation. Pt performed bed mobility with SBA and ambulated in hospital room 10' with RW and CGA. Pt reported increased LLE pain with mobility (9/10). Pt returned to supine with all needs met.    Rehab Prognosis: Fair; patient would benefit from acute skilled PT services to address these deficits and reach maximum level of function.    Recent Surgery: * No surgery found *      Plan:     During this hospitalization, patient to be seen 5 x/week to address the identified rehab impairments via gait training,therapeutic activities,therapeutic exercises and progress toward the following goals:    · Plan of Care Expires:  03/16/22    Subjective     Chief Complaint: LLE pain  Patient/Family Comments/goals: to go home  Pain/Comfort:  · Pain Rating 1: 0/10  · Pain Rating Post-Intervention 1: 9/10  · Location - Side 2: Left  · Location 2: leg  · Pain Addressed 2: Reposition,Distraction    Living Environment:  Pt lives alone in a senior living facility. Pt has family close by that can assist if needed.  Prior to admission, patients level of function was mod I.  Equipment used at home: shower chair,cane, straight,grab bar.  DME owned (not currently used): none.  Upon discharge, patient will have assistance from family/facility.    Objective:     Communicated with RN prior to  session.  Patient found supine with bed alarm,telemetry,peripheral IV  upon PT entry to room.    General Precautions: Standard, fall   Orthopedic Precautions:N/A   Braces: N/A  Respiratory Status: Room air    Exams:  · Cognitive Exam:  Patient is oriented to Person, Place, Time and Situation  · Gross Motor Coordination:  WFL  · Sensation:    · -       Intact  · RLE ROM: WFL  · RLE Strength: WFL  · LLE ROM: WFL, reports of increased pain  · LLE Strength: WFL    Functional Mobility:  · Bed Mobility:     · Supine to Sit: stand by assistance  · Sit to Supine: stand by assistance  · Transfers:     · Sit to Stand:  contact guard assistance with rolling walker  · Gait: 10' with RW and CGA, slow gait speed due to LLE pain    Therapeutic Activities and Exercises:   Education, poc, dc planning, fall prevention. Pt educated on use of AD for balance.    AM-PAC 6 CLICK MOBILITY  Total Score:19     Patient left supine with all lines intact, call button in reach and bed alarm on.    GOALS:   Multidisciplinary Problems     Physical Therapy Goals        Problem: Physical Therapy Goal    Goal Priority Disciplines Outcome Goal Variances Interventions   Physical Therapy Goal     PT, PT/OT Ongoing, Progressing     Description: Goals to be met by: discharge     Patient will increase functional independence with mobility by performin. Supine to sit with Modified [place]  2. Sit to stand transfer with Modified [place]  3. Gait  x 50 feet with Stand-by Assistance using Rolling Walker.                      History:     Past Medical History:   Diagnosis Date    Arthritis     DM2 (diabetes mellitus, type 2)     Glaucoma capsular     HLD (hyperlipidemia)     Hypertension     Kidney stones     Skin lesion of breast        Past Surgical History:   Procedure Laterality Date    BREAST SURGERY      removed lump to right breast     EYE SURGERY      URETERAL STENT PLACEMENT      and removed       Time Tracking:     PT  Received On: 02/16/22  PT Start Time: 1324     PT Stop Time: 1336  PT Total Time (min): 12 min     Billable Minutes: Evaluation 12      02/16/2022

## 2022-02-16 NOTE — SUBJECTIVE & OBJECTIVE
Interval History: Pt's GNR is unidentified at present and I do not feel that I can discharge her at present  Review of Systems   Constitutional: Positive for fatigue.   HENT: Negative.    Eyes: Negative.    Respiratory: Negative.    Cardiovascular: Negative.    Gastrointestinal: Negative.    Endocrine: Positive for cold intolerance and heat intolerance.   Genitourinary: Negative.    Musculoskeletal: Positive for arthralgias, gait problem and myalgias.   Skin: Negative.    Allergic/Immunologic: Negative.    Neurological: Positive for weakness.   Hematological: Negative.    Psychiatric/Behavioral: Positive for decreased concentration and dysphoric mood.     Objective:     Vital Signs (Most Recent):  Temp: 98.6 °F (37 °C) (02/16/22 1121)  Pulse: 99 (02/16/22 1121)  Resp: 18 (02/16/22 1121)  BP: (!) 142/84 (02/16/22 1121)  SpO2: 95 % (02/16/22 1121) Vital Signs (24h Range):  Temp:  [97.3 °F (36.3 °C)-98.6 °F (37 °C)] 98.6 °F (37 °C)  Pulse:  [87-99] 99  Resp:  [16-18] 18  SpO2:  [95 %-100 %] 95 %  BP: (113-142)/(76-84) 142/84     Weight: 62.1 kg (136 lb 14.5 oz)  Body mass index is 25.04 kg/m².    Intake/Output Summary (Last 24 hours) at 2/16/2022 1339  Last data filed at 2/16/2022 0900  Gross per 24 hour   Intake 633.13 ml   Output 250 ml   Net 383.13 ml      Physical Exam  Vitals and nursing note reviewed.   Constitutional:       General: She is not in acute distress.  HENT:      Head: Normocephalic and atraumatic.      Nose: Nose normal.      Mouth/Throat:      Mouth: Mucous membranes are moist.   Eyes:      Extraocular Movements: Extraocular movements intact.      Pupils: Pupils are equal, round, and reactive to light.   Cardiovascular:      Rate and Rhythm: Normal rate and regular rhythm.   Pulmonary:      Effort: Pulmonary effort is normal.      Comments: Diminished basilar breath sounds  Abdominal:      General: Bowel sounds are normal. There is no distension.      Tenderness: There is no abdominal tenderness.  There is no guarding.   Musculoskeletal:      Cervical back: Normal range of motion and neck supple.      Comments: Limited cooperation with the exam   Skin:     General: Skin is warm.   Neurological:      Mental Status: She is alert.      Comments: Limited cooperation with the exam   Psychiatric:      Comments: Dulled affect         Significant Labs:   All pertinent labs within the past 24 hours have been reviewed.  Recent Lab Results       02/16/22  1120   02/16/22  0743   02/16/22  0521   02/15/22  2219   02/15/22  2032        Albumin     2.9           Alkaline Phosphatase     98           ALT     13           Anion Gap     11           AST     18           Baso #     0.09           Basophil %     0.9           BILIRUBIN TOTAL     1.0  Comment: For infants and newborns, interpretation of results should be based  on gestational age, weight and in agreement with clinical  observations.    Premature Infant recommended reference ranges:  Up to 24 hours.............<8.0 mg/dL  Up to 48 hours............<12.0 mg/dL  3-5 days..................<15.0 mg/dL  6-29 days.................<15.0 mg/dL             BUN     33           Calcium     9.4           Chloride     104           CO2     22           Creatinine     1.6           Differential Method     Automated           eGFR if      34.1           eGFR if non      29.6  Comment: Calculation used to obtain the estimated glomerular filtration  rate (eGFR) is the CKD-EPI equation.              Eos #     0.1           Eosinophil %     1.0           Glucose     92           Gran # (ANC)     7.3           Gran %     73.0           Hematocrit     39.0           Hemoglobin     12.3           Immature Grans (Abs)     0.08  Comment: Mild elevation in immature granulocytes is non specific and   can be seen in a variety of conditions including stress response,   acute inflammation, trauma and pregnancy. Correlation with other   laboratory and clinical  findings is essential.             Immature Granulocytes     0.8           Lymph #     1.7           Lymph %     16.7           Magnesium     2.2           MCH     25.1           MCHC     31.5           MCV     79           Mono #     0.8           Mono %     7.6           MPV     10.4           nRBC     0           Phosphorus     3.0           Platelets     213           POC Glucose 83   71       111       Potassium     3.5           PROTEIN TOTAL     6.6           RBC     4.91           RDW     15.1           Sodium     137           Vancomycin, Random       16.2         WBC     9.93                            02/15/22  1650        Albumin       Alkaline Phosphatase       ALT       Anion Gap       AST       Baso #       Basophil %       BILIRUBIN TOTAL       BUN       Calcium       Chloride       CO2       Creatinine       Differential Method       eGFR if        eGFR if non        Eos #       Eosinophil %       Glucose       Gran # (ANC)       Gran %       Hematocrit       Hemoglobin       Immature Grans (Abs)       Immature Granulocytes       Lymph #       Lymph %       Magnesium       MCH       MCHC       MCV       Mono #       Mono %       MPV       nRBC       Phosphorus       Platelets       POC Glucose 106       Potassium       PROTEIN TOTAL       RBC       RDW       Sodium       Vancomycin, Random       WBC             Significant Imaging: I have reviewed all pertinent imaging results/findings within the past 24 hours.

## 2022-02-16 NOTE — PROGRESS NOTES
Pharmacokinetic Assessment Follow Up: IV Vancomycin    Patient brief summary:  Azul Clifford is a 81 y.o. female initiated on antimicrobial therapy with IV Vancomycin for treatment of Bacteremia    The patient's current regimen is intermittent dosing based on random levels      Actual Body Weight = 62.1 kg (136 lb 14.5 oz).    Renal Function:   Estimated Creatinine Clearance: 21.7 mL/min (A) (based on SCr of 1.7 mg/dL (H)).      Vancomycin serum concentration assessment(s):    The random level was drawn correctly and can be used to guide therapy at this time. The measurement is within the desired definitive target range of 15 to 20 mcg/mL.    Vancomycin Regimen Plan:    Will re-dose with 1000 mg IV x 1 dose. Next random level to be drawn at 0900 on 2/17    Drug levels (last 3 results):  Recent Labs   Lab Result Units 02/14/22  2125 02/15/22  2219   Vancomycin, Random ug/mL 12.4 16.2       Pharmacy will continue to follow and monitor vancomycin.    Please contact pharmacy at extension [phone] for questions regarding this assessment.    Thank you for the consult,   Jesus Sheth

## 2022-02-16 NOTE — NURSING
SBAR received. Patient lying in bed, eyes closed. Easily aroused. Resp even and unlabored. NAD noted. Bed locked in low position with SR x 2. Call light in reach. Will continue to monitor.

## 2022-02-16 NOTE — PT/OT/SLP EVAL
Occupational Therapy   Evaluation    Name: Azul Clifford  MRN: [id number]  Admitting Diagnosis:  Acute bilateral deep vein thrombosis (DVT) of femoral veins  Recent Surgery: * No surgery found *      Recommendations:     Discharge Recommendations: home health OT  Discharge Equipment Recommendations:   (TBD)  Barriers to discharge:  None    Assessment:     Azul Clifford is a 81 y.o. female with a medical diagnosis of Acute bilateral deep vein thrombosis (DVT) of femoral veins.  Pt agreeable to OT evaluation this AM. Performance deficits affecting function: weakness,impaired endurance,impaired self care skills,impaired functional mobilty,gait instability,impaired balance,pain.  Pt reports pain to LLE limiting performance this date. Pt reports she lives at a senior living facility and PTA was mod I with ADLs, IADLs, and functional mobility. Rec HHOT at d/c.    Rehab Prognosis: Fair; patient would benefit from acute skilled OT services to address these deficits and reach maximum level of function.       Plan:     Patient to be seen 5 x/week to address the above listed problems via self-care/home management,therapeutic activities,therapeutic exercises  · Plan of Care Expires: 03/16/22  · Plan of Care Reviewed with: patient    Subjective     Chief Complaint: LLE pain  Patient/Family Comments/goals: to return home    Occupational Profile:  Living Environment: Pt lives alone in a senior living facility. Pt reports her daughter lives close and comes by to assist pt as needed. Pt has a tub/shower combo with a shower chair and grab bars present.  Previous level of function: Mod I with ADLs, IADLs, and functional mobility. Pt reiles on family for transportation and assist with grocery shopping   Roles and Routines: mother  Equipment Used at Home:  shower chair,grab bar,cane, straight  Assistance upon Discharge: yes, from family/facility     Pain/Comfort:  · Pain Rating 1:  (not rated)  · Location - Side 1: Right  · Location 1:  leg  · Pain Addressed 1: Reposition,Distraction,Cessation of Activity    Patients cultural, spiritual, Yazidism conflicts given the current situation:      Objective:     Communicated with: nursing prior to session.  Patient found HOB elevated with bed alarm,telemetry,peripheral IV upon OT entry to room.    General Precautions: Standard, fall   Orthopedic Precautions:N/A   Braces: N/A  Respiratory Status: Room air    Occupational Performance:    Bed Mobility:    · Patient completed Supine to Sit with stand by assistance, with side rail and increased time  · Patient completed Sit to Supine with stand by assistance    Functional Mobility/Transfers:  · Patient completed Sit <> Stand Transfer with stand by assistance and contact guard assistance  with  rolling walker   · Functional Mobility: pt took a few steps forward/back with contact guard assistance with rolling walker with no loss of balance or shortness of breath    Activities of Daily Living:  · Feeding:  independence per pt  · Grooming: stand by assistance seated EOB  · Lower Body Dressing: seated EOB to don/doff socks       Cognitive/Visual Perceptual:  Cognitive/Psychosocial Skills:     -       Follows Commands/attention:Follows one-step commands  -       Communication: clear/fluent  -       Safety awareness/insight to disability: intact   -       Mood/Affect/Coping skills/emotional control: Appropriate to situation, Cooperative and Pleasant    Physical Exam:  Balance:    -       SBA seated balance; CGA standing balance   Upper Extremity Range of Motion:     -       Right Upper Extremity: ~40 degrees AROM shldr flex; WFL PROM shldr flex; WFL distally  -       Left Upper Extremity: ~70 degrees AROM shldr flex; WFL PROM shldr flex; WFL distally  Upper Extremity Strength:    -       Right Upper Extremity: 2+/5 shoulder flex; WFL distally  -       Left Upper Extremity: 2+/5 shoulder flex; WFL distally   Strength:    -       Right Upper Extremity: fair    -       Left Upper Extremity: fair   Fine Motor Coordination:    -       Intact  Gross motor coordination:   WFL    AMPAC 6 Click ADL:  AMPAC Total Score: 19    Treatment & Education:  Pt educated on role of OT/POC, importance of OOB/EOB activity, use of call bell, and safety during ADLs, transfers, and functional mobility.  Education:    Patient left HOB elevated with all lines intact, call button in reach and bed alarm on    GOALS:   Multidisciplinary Problems     Occupational Therapy Goals        Problem: Occupational Therapy Goal    Goal Priority Disciplines Outcome Interventions   Occupational Therapy Goal     OT, PT/OT     Description: Goals to be met by: discharge    Patient will increase functional independence with ADLs by performing:    UE Dressing with Supervision.  LE Dressing with Supervision.  Grooming while standing at sink with Supervision.  Toileting from toilet with Supervision for hygiene and clothing management.   Toilet transfer to toilet with Supervision.                     History:     Past Medical History:   Diagnosis Date    Arthritis     DM2 (diabetes mellitus, type 2)     Glaucoma capsular     HLD (hyperlipidemia)     Hypertension     Kidney stones     Skin lesion of breast        Past Surgical History:   Procedure Laterality Date    BREAST SURGERY      removed lump to right breast     EYE SURGERY      URETERAL STENT PLACEMENT      and removed       Time Tracking:     OT Date of Treatment: 02/16/22  OT Start Time: 1123  OT Stop Time: 1136  OT Total Time (min): 13 min    Billable Minutes:Evaluation 5  Self Care/Home Management 8    2/16/2022

## 2022-02-16 NOTE — PROGRESS NOTES
[facility] Medicine  Progress Note    Patient Name: Azul Clifford  MRN: [id number]  Patient Class: IP- Inpatient   Admission Date: 2/13/2022  Length of Stay: 3 days  Attending Physician: Case Tamayo MD  Primary Care Provider: Andrew Francisco Iv, MD        Subjective:     Principal Problem:Acute bilateral deep vein thrombosis (DVT) of femoral veins        HPI:  Azul Clifford is a 81 y.o. Black or  female with known history of an, diabetes mellitus, DVT, PE/DVT (noncompliance with anticoagulation), hyperlipidemia who presented with a primary complaint of Loss of Consciousness (Found unresponsive, hypoxic to 60s in hallway)  History was obtained from the patient and collateral obtained from the family present at the bedside and ER physician Sign-out.      States that she did not feel well yesterday, having left leg pain, abdominal pain/constipation.  Also reports mild shortness of breath.  Denies fever, chills, chest pain, nausea, vomiting, diarrhea, urinary symptoms.  Slept a lot yesterday.  Has not been taking medication as prescribed, especially her blood thinner.     Earlier today, she walked to open the door when her daughter got to her house. She walked back and sat on a chair. She complained of being tired and shortly became unresponsive - stop talking and not answering questions. EMS was activated and she subsequently brought to the ED.      Per ED physician, EMS reported that was awake, alert, and oriented with stable vital signs.  She became shortness of breath, tachycardic, with SpO2 in the 60 in the ED hallway  CBC - WBC 15.78, hemoglobin 14.4, hematocrit 45.7, platelet 187  CMP - potassium 3.6, BUN 30, creatinine 2.0, estimated GFR 26, glucose 183  BNP -38  Troponin - 0.032  COVID 19 - Negative  Lactic acid elevated 4.3  EKG - sinus tachycardia, rate 113, left ventricular hypertrophy, no ST elevation  CXR -  IMPRESSION:  1. No acute pulmonary process.  2.  Prominence of the aortic knob suggestive of an aneurysm or dissection, new/worse when compared to the previous exam, consider dedicated contrast-enhanced CT follow-up.  NM lung scan - Intermediate-to high probability for acute PE.  CT Head without contrast - no acute intracranial process  CT cervical spine - no acute fracture or traumatic malalignment the cervical spine  CT head abdomen pelvis -  IMPRESSION:  1. Prominence of the left greater than right lower extremity deep venous vessels from the common iliac to the visualized proximal thigh with mild adjacent fat stranding suspicious for an occult deep venous thrombus and adjacent edema, consider correlation with lower extremity Doppler ultrasound.  2. Fusiform infrarenal abdominal aortic aneurysm measuring 3.4 cm in diameter, there is mild fat stranding along the distal abdominal aorta and iliacs, possibly from underlying deep venous thrombosis, noting that aortic pathology is also a consideration (aortitis, or less likely a leaking aneurysm).  3. 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  4. Mild cardiomegaly and scattered coronary arterial calcifications (3 vessel disease).  5. Large stool ball in the rectal vault, consider correlation for fecal impaction, and/or constipation, with a moderate volume of stool and gas in the remainder the colon.  6. Small nonobstructing left-sided renal stone measuring 2 mm.  7. Sludge in the gallbladder without findings of acute cholecystitis.  8. Numerous additional, and incidental findings as noted above.  US venous LEs -  Extensive DVT bilaterally, occlusive throughout the left lower extremity and occlusive in the right femoral vein.      Overview/Hospital Course:  Patient reports 2 day history of bilateral leg pain and weakness, possibly shortness of breath but poor historian.  States she lives alone.  Reportedly had syncopal episode with collapse, in the ED was noted to be  hypoxic, noncompliant with anticoagulation.  Saturations improved, vital stable, lactic acid 4.3 but improved to 1.9.  Lower extremity with extensive bilateral DVT, CKD stage 3, V/Q scan with intermediate to high probability acute PE, started on therapeutic dose Lovenox.  On 02/14, hemodynamically stable, echocardiogram pending, 1/2 blood culture preliminary with GPC (on vancomycin), vascular and Hematology consult pending.    02/15/2022  Ms Clifford notes some leg pain. She is a poor historian    2/16/2022  Ms Clifford still feels weak with occasional leg cramps. She denies fever, chills SOB or chest pain. Per her family she will live at [facility] with a family member staying with her       Interval History: Pt's GNR is unidentified at present and I do not feel that I can discharge her at present  Review of Systems   Constitutional: Positive for fatigue.   HENT: Negative.    Eyes: Negative.    Respiratory: Negative.    Cardiovascular: Negative.    Gastrointestinal: Negative.    Endocrine: Positive for cold intolerance and heat intolerance.   Genitourinary: Negative.    Musculoskeletal: Positive for arthralgias, gait problem and myalgias.   Skin: Negative.    Allergic/Immunologic: Negative.    Neurological: Positive for weakness.   Hematological: Negative.    Psychiatric/Behavioral: Positive for decreased concentration and dysphoric mood.     Objective:     Vital Signs (Most Recent):  Temp: 98.6 °F (37 °C) (02/16/22 1121)  Pulse: 99 (02/16/22 1121)  Resp: 18 (02/16/22 1121)  BP: (!) 142/84 (02/16/22 1121)  SpO2: 95 % (02/16/22 1121) Vital Signs (24h Range):  Temp:  [97.3 °F (36.3 °C)-98.6 °F (37 °C)] 98.6 °F (37 °C)  Pulse:  [87-99] 99  Resp:  [16-18] 18  SpO2:  [95 %-100 %] 95 %  BP: (113-142)/(76-84) 142/84     Weight: 62.1 kg (136 lb 14.5 oz)  Body mass index is 25.04 kg/m².    Intake/Output Summary (Last 24 hours) at 2/16/2022 1339  Last data filed at 2/16/2022 0900  Gross per 24 hour   Intake 633.13 ml   Output  250 ml   Net 383.13 ml      Physical Exam  Vitals and nursing note reviewed.   Constitutional:       General: She is not in acute distress.  HENT:      Head: Normocephalic and atraumatic.      Nose: Nose normal.      Mouth/Throat:      Mouth: Mucous membranes are moist.   Eyes:      Extraocular Movements: Extraocular movements intact.      Pupils: Pupils are equal, round, and reactive to light.   Cardiovascular:      Rate and Rhythm: Normal rate and regular rhythm.   Pulmonary:      Effort: Pulmonary effort is normal.      Comments: Diminished basilar breath sounds  Abdominal:      General: Bowel sounds are normal. There is no distension.      Tenderness: There is no abdominal tenderness. There is no guarding.   Musculoskeletal:      Cervical back: Normal range of motion and neck supple.      Comments: Limited cooperation with the exam   Skin:     General: Skin is warm.   Neurological:      Mental Status: She is alert.      Comments: Limited cooperation with the exam   Psychiatric:      Comments: Dulled affect         Significant Labs:   All pertinent labs within the past 24 hours have been reviewed.  Recent Lab Results       02/16/22  1120   02/16/22  0743   02/16/22  0521   02/15/22  2219   02/15/22  2032        Albumin     2.9           Alkaline Phosphatase     98           ALT     13           Anion Gap     11           AST     18           Baso #     0.09           Basophil %     0.9           BILIRUBIN TOTAL     1.0  Comment: For infants and newborns, interpretation of results should be based  on gestational age, weight and in agreement with clinical  observations.    Premature Infant recommended reference ranges:  Up to 24 hours.............<8.0 mg/dL  Up to 48 hours............<12.0 mg/dL  3-5 days..................<15.0 mg/dL  6-29 days.................<15.0 mg/dL             BUN     33           Calcium     9.4           Chloride     104           CO2     22           Creatinine     1.6            Differential Method     Automated           eGFR if      34.1           eGFR if non      29.6  Comment: Calculation used to obtain the estimated glomerular filtration  rate (eGFR) is the CKD-EPI equation.              Eos #     0.1           Eosinophil %     1.0           Glucose     92           Gran # (ANC)     7.3           Gran %     73.0           Hematocrit     39.0           Hemoglobin     12.3           Immature Grans (Abs)     0.08  Comment: Mild elevation in immature granulocytes is non specific and   can be seen in a variety of conditions including stress response,   acute inflammation, trauma and pregnancy. Correlation with other   laboratory and clinical findings is essential.             Immature Granulocytes     0.8           Lymph #     1.7           Lymph %     16.7           Magnesium     2.2           MCH     25.1           MCHC     31.5           MCV     79           Mono #     0.8           Mono %     7.6           MPV     10.4           nRBC     0           Phosphorus     3.0           Platelets     213           POC Glucose 83   71       111       Potassium     3.5           PROTEIN TOTAL     6.6           RBC     4.91           RDW     15.1           Sodium     137           Vancomycin, Random       16.2         WBC     9.93                            02/15/22  1650        Albumin       Alkaline Phosphatase       ALT       Anion Gap       AST       Baso #       Basophil %       BILIRUBIN TOTAL       BUN       Calcium       Chloride       CO2       Creatinine       Differential Method       eGFR if        eGFR if non        Eos #       Eosinophil %       Glucose       Gran # (ANC)       Gran %       Hematocrit       Hemoglobin       Immature Grans (Abs)       Immature Granulocytes       Lymph #       Lymph %       Magnesium       MCH       MCHC       MCV       Mono #       Mono %       MPV       nRBC       Phosphorus        Platelets       POC Glucose 106       Potassium       PROTEIN TOTAL       RBC       RDW       Sodium       Vancomycin, Random       WBC             Significant Imaging: I have reviewed all pertinent imaging results/findings within the past 24 hours.      Assessment/Plan:    2/16/2022  A)  GNR sepsis-to be identified  JAMIE resolving  Acute bilateral DVT and PE  AAA  HTN  Syncope and collapse  P)  Awaiting GNR ID  Continue meropenem  D/C lovenox  Dalbigatran 75 mg BID.    No notes have been filed under this hospital service.  Service: Hospital Medicine    VTE Risk Mitigation (From admission, onward)         Ordered     dabigatran etexilate capsule 75 mg  2 times daily         02/16/22 1102                Discharge Planning   MANOJ: 2/17/2022     Code Status: Full Code   Is the patient medically ready for discharge?:     Reason for patient still in hospital (select all that apply): Treatment and Consult recommendations  Discharge Plan A: Home Health                  Case Tamayo MD  Department of Hospital Medicine   [facility]

## 2022-02-16 NOTE — SUBJECTIVE & OBJECTIVE
Interval History: Ms Clifford noted lower extremity pain but cannot quantify. Some of it is knee pain    Review of Systems   Constitutional: Positive for fatigue.   HENT: Negative.    Eyes: Negative.    Respiratory: Negative.    Cardiovascular: Negative.    Gastrointestinal: Negative.    Endocrine: Positive for cold intolerance and heat intolerance.   Genitourinary: Negative.    Musculoskeletal: Positive for arthralgias, gait problem and myalgias.   Skin: Negative.    Allergic/Immunologic: Negative.    Neurological: Positive for weakness.   Hematological: Negative.    Psychiatric/Behavioral: Positive for decreased concentration. The patient is nervous/anxious.      Objective:     Vital Signs (Most Recent):  Temp: 97.3 °F (36.3 °C) (02/15/22 1815)  Pulse: 87 (02/15/22 1815)  Resp: 18 (02/15/22 1815)  BP: 119/82 (02/15/22 1815)  SpO2: 100 % (02/15/22 1815) Vital Signs (24h Range):  Temp:  [97.3 °F (36.3 °C)-98.6 °F (37 °C)] 97.3 °F (36.3 °C)  Pulse:  [59-88] 87  Resp:  [16-18] 18  SpO2:  [96 %-100 %] 100 %  BP: (101-119)/(67-83) 119/82     Weight: 62.1 kg (136 lb 14.5 oz)  Body mass index is 25.04 kg/m².    Intake/Output Summary (Last 24 hours) at 2/15/2022 1817  Last data filed at 2/15/2022 0412  Gross per 24 hour   Intake 50 ml   Output --   Net 50 ml      Physical Exam  Vitals and nursing note reviewed.   Constitutional:       General: She is not in acute distress.  HENT:      Head: Normocephalic and atraumatic.      Nose: Nose normal.      Mouth/Throat:      Mouth: Mucous membranes are moist.   Eyes:      Extraocular Movements: Extraocular movements intact.      Pupils: Pupils are equal, round, and reactive to light.   Cardiovascular:      Rate and Rhythm: Normal rate and regular rhythm.   Pulmonary:      Effort: Pulmonary effort is normal.      Breath sounds: Normal breath sounds.   Abdominal:      General: Bowel sounds are normal. There is no distension.      Tenderness: There is no abdominal tenderness. There is  no guarding.   Musculoskeletal:         General: Normal range of motion.      Cervical back: Normal range of motion and neck supple.      Comments: Left knee and calf tenderness   Skin:     General: Skin is warm.   Neurological:      General: No focal deficit present.      Mental Status: She is alert.   Psychiatric:      Comments: Dulled affect         Significant Labs:   All pertinent labs within the past 24 hours have been reviewed.  Recent Lab Results       02/15/22  1650   02/15/22  1203   02/15/22  0835   02/15/22  0521   02/14/22  2125        Albumin       2.8         Alkaline Phosphatase       94         ALT       11         Anion Gap       13         AST       19         Baso #       0.06         Basophil %       0.5         BILIRUBIN TOTAL       1.1  Comment: For infants and newborns, interpretation of results should be based  on gestational age, weight and in agreement with clinical  observations.    Premature Infant recommended reference ranges:  Up to 24 hours.............<8.0 mg/dL  Up to 48 hours............<12.0 mg/dL  3-5 days..................<15.0 mg/dL  6-29 days.................<15.0 mg/dL           BUN       36         Calcium       9.0         Chloride       103         CO2       22         Creatinine       1.7         Differential Method       Automated         eGFR if        31.7         eGFR if non        27.5  Comment: Calculation used to obtain the estimated glomerular filtration  rate (eGFR) is the CKD-EPI equation.            Eos #       0.2         Eosinophil %       1.4         Glucose       97         Gran # (ANC)       8.4         Gran %       72.7         Hematocrit       38.9         Hemoglobin       11.9         Immature Grans (Abs)       0.07  Comment: Mild elevation in immature granulocytes is non specific and   can be seen in a variety of conditions including stress response,   acute inflammation, trauma and pregnancy. Correlation with other    laboratory and clinical findings is essential.           Immature Granulocytes       0.6         Lymph #       1.9         Lymph %       16.4         Magnesium       2.1         MCH       25.2         MCHC       30.6         MCV       82         Mono #       1.0         Mono %       8.4         MPV       10.4         nRBC       0         Phosphorus       3.1         Platelets       173  Comment: Reviewed by Technologist.         POC Glucose 106   109   106           Potassium       3.1         PROTEIN TOTAL       6.4         RBC       4.72         RDW       15.0         Sodium       138         Vancomycin, Random         12.4       WBC       11.52                          02/14/22 2058        Albumin       Alkaline Phosphatase       ALT       Anion Gap       AST       Baso #       Basophil %       BILIRUBIN TOTAL       BUN       Calcium       Chloride       CO2       Creatinine       Differential Method       eGFR if        eGFR if non        Eos #       Eosinophil %       Glucose       Gran # (ANC)       Gran %       Hematocrit       Hemoglobin       Immature Grans (Abs)       Immature Granulocytes       Lymph #       Lymph %       Magnesium       MCH       MCHC       MCV       Mono #       Mono %       MPV       nRBC       Phosphorus       Platelets       POC Glucose 100       Potassium       PROTEIN TOTAL       RBC       RDW       Sodium       Vancomycin, Random       WBC             Significant Imaging: I have reviewed all pertinent imaging results/findings within the past 24 hours.

## 2022-02-16 NOTE — PLAN OF CARE
Problem: Diabetes Comorbidity  Goal: Blood Glucose Level Within Targeted Range  Outcome: Ongoing, Not Progressing

## 2022-02-16 NOTE — PLAN OF CARE
Problem: Physical Therapy Goal  Goal: Physical Therapy Goal  Description: Goals to be met by: discharge     Patient will increase functional independence with mobility by performin. Supine to sit with Modified [place]  2. Sit to stand transfer with Modified [place]  3. Gait  x 50 feet with Stand-by Assistance using Rolling Walker.     Outcome: Ongoing, Progressing

## 2022-02-16 NOTE — PROGRESS NOTES
Renal Dosing of Medication    An order has been entered by a pharmacist to adjust the dose and/or frequency of the medication listed below based on the patient's renal function.    Objective:  81 y.o., female, Actual Body Weight = 62.1 kg (136 lb 14.5 oz)    Current Regimen:  ENOXAPARIN 70 MG SQ EVERY 24 HOURS    Assessment:  Estimated Creatinine Clearance: 23.1 mL/min (A) (based on SCr of 1.6 mg/dL (H)).  Renal function is poor.    Plan:  Orders have been entered to adjust the dose and/or frequency based on the patient's weight and renal function:      BASED UPON WEIGHT CHANGE DOSE CHANGED TO ENOXAPARIN 60 MG SQ EVERY 24 HOURS.    Thank you for allowing us to participate in this patient's care.     Ronnie Kovacs 2/16/2022 6:44 AM  Department of Pharmacy  Ext [phone]

## 2022-02-16 NOTE — PROGRESS NOTES
[facility]  Progress Note    Patient Name: Azul Clifford  MRN: [id number]  Patient Class: IP- Inpatient   Admission Date: 2/13/2022  Length of Stay: 2 days  Attending Physician: Case Tamayo MD  Primary Care Provider: Andrew Francisco Iv, MD        Subjective:     Principal Problem:Acute bilateral deep vein thrombosis (DVT) of femoral veins        HPI:  Azul Clifford is a 81 y.o. Black or  female with known history of an, diabetes mellitus, DVT, PE/DVT (noncompliance with anticoagulation), hyperlipidemia who presented with a primary complaint of Loss of Consciousness (Found unresponsive, hypoxic to 60s in hallway)  History was obtained from the patient and collateral obtained from the family present at the bedside and ER physician Sign-out.      States that she did not feel well yesterday, having left leg pain, abdominal pain/constipation.  Also reports mild shortness of breath.  Denies fever, chills, chest pain, nausea, vomiting, diarrhea, urinary symptoms.  Slept a lot yesterday.  Has not been taking medication as prescribed, especially her blood thinner.     Earlier today, she walked to open the door when her daughter got to her house. She walked back and sat on a chair. She complained of being tired and shortly became unresponsive - stop talking and not answering questions. EMS was activated and she subsequently brought to the ED.      Per ED physician, EMS reported that was awake, alert, and oriented with stable vital signs.  She became shortness of breath, tachycardic, with SpO2 in the 60 in the ED hallway  CBC - WBC 15.78, hemoglobin 14.4, hematocrit 45.7, platelet 187  CMP - potassium 3.6, BUN 30, creatinine 2.0, estimated GFR 26, glucose 183  BNP -38  Troponin - 0.032  COVID 19 - Negative  Lactic acid elevated 4.3  EKG - sinus tachycardia, rate 113, left ventricular hypertrophy, no ST elevation  CXR -  IMPRESSION:  1. No acute pulmonary process.  2.  Prominence of the aortic knob suggestive of an aneurysm or dissection, new/worse when compared to the previous exam, consider dedicated contrast-enhanced CT follow-up.  NM lung scan - Intermediate-to high probability for acute PE.  CT Head without contrast - no acute intracranial process  CT cervical spine - no acute fracture or traumatic malalignment the cervical spine  CT head abdomen pelvis -  IMPRESSION:  1. Prominence of the left greater than right lower extremity deep venous vessels from the common iliac to the visualized proximal thigh with mild adjacent fat stranding suspicious for an occult deep venous thrombus and adjacent edema, consider correlation with lower extremity Doppler ultrasound.  2. Fusiform infrarenal abdominal aortic aneurysm measuring 3.4 cm in diameter, there is mild fat stranding along the distal abdominal aorta and iliacs, possibly from underlying deep venous thrombosis, noting that aortic pathology is also a consideration (aortitis, or less likely a leaking aneurysm).  3. 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  4. Mild cardiomegaly and scattered coronary arterial calcifications (3 vessel disease).  5. Large stool ball in the rectal vault, consider correlation for fecal impaction, and/or constipation, with a moderate volume of stool and gas in the remainder the colon.  6. Small nonobstructing left-sided renal stone measuring 2 mm.  7. Sludge in the gallbladder without findings of acute cholecystitis.  8. Numerous additional, and incidental findings as noted above.  US venous LEs -  Extensive DVT bilaterally, occlusive throughout the left lower extremity and occlusive in the right femoral vein.      Overview/Hospital Course:  Patient reports 2 day history of bilateral leg pain and weakness, possibly shortness of breath but poor historian.  States she lives alone.  Reportedly had syncopal episode with collapse, in the ED was noted to be  hypoxic, noncompliant with anticoagulation.  Saturations improved, vital stable, lactic acid 4.3 but improved to 1.9.  Lower extremity with extensive bilateral DVT, CKD stage 3, V/Q scan with intermediate to high probability acute PE, started on therapeutic dose Lovenox.  On 02/14, hemodynamically stable, echocardiogram pending, 1/2 blood culture preliminary with GPC (on vancomycin), vascular and Hematology consult pending.    02/15/2022  Ms Clifford notes some leg pain. She is a poor historian      Interval History: Ms Clifford noted lower extremity pain but cannot quantify. Some of it is knee pain    Review of Systems   Constitutional: Positive for fatigue.   HENT: Negative.    Eyes: Negative.    Respiratory: Negative.    Cardiovascular: Negative.    Gastrointestinal: Negative.    Endocrine: Positive for cold intolerance and heat intolerance.   Genitourinary: Negative.    Musculoskeletal: Positive for arthralgias, gait problem and myalgias.   Skin: Negative.    Allergic/Immunologic: Negative.    Neurological: Positive for weakness.   Hematological: Negative.    Psychiatric/Behavioral: Positive for decreased concentration. The patient is nervous/anxious.      Objective:     Vital Signs (Most Recent):  Temp: 97.3 °F (36.3 °C) (02/15/22 1815)  Pulse: 87 (02/15/22 1815)  Resp: 18 (02/15/22 1815)  BP: 119/82 (02/15/22 1815)  SpO2: 100 % (02/15/22 1815) Vital Signs (24h Range):  Temp:  [97.3 °F (36.3 °C)-98.6 °F (37 °C)] 97.3 °F (36.3 °C)  Pulse:  [59-88] 87  Resp:  [16-18] 18  SpO2:  [96 %-100 %] 100 %  BP: (101-119)/(67-83) 119/82     Weight: 62.1 kg (136 lb 14.5 oz)  Body mass index is 25.04 kg/m².    Intake/Output Summary (Last 24 hours) at 2/15/2022 1817  Last data filed at 2/15/2022 0412  Gross per 24 hour   Intake 50 ml   Output --   Net 50 ml      Physical Exam  Vitals and nursing note reviewed.   Constitutional:       General: She is not in acute distress.  HENT:      Head: Normocephalic and atraumatic.       Nose: Nose normal.      Mouth/Throat:      Mouth: Mucous membranes are moist.   Eyes:      Extraocular Movements: Extraocular movements intact.      Pupils: Pupils are equal, round, and reactive to light.   Cardiovascular:      Rate and Rhythm: Normal rate and regular rhythm.   Pulmonary:      Effort: Pulmonary effort is normal.      Breath sounds: Normal breath sounds.   Abdominal:      General: Bowel sounds are normal. There is no distension.      Tenderness: There is no abdominal tenderness. There is no guarding.   Musculoskeletal:         General: Normal range of motion.      Cervical back: Normal range of motion and neck supple.      Comments: Left knee and calf tenderness   Skin:     General: Skin is warm.   Neurological:      General: No focal deficit present.      Mental Status: She is alert.   Psychiatric:      Comments: Dulled affect         Significant Labs:   All pertinent labs within the past 24 hours have been reviewed.  Recent Lab Results       02/15/22  1650   02/15/22  1203   02/15/22  0835   02/15/22  0521   02/14/22  2125        Albumin       2.8         Alkaline Phosphatase       94         ALT       11         Anion Gap       13         AST       19         Baso #       0.06         Basophil %       0.5         BILIRUBIN TOTAL       1.1  Comment: For infants and newborns, interpretation of results should be based  on gestational age, weight and in agreement with clinical  observations.    Premature Infant recommended reference ranges:  Up to 24 hours.............<8.0 mg/dL  Up to 48 hours............<12.0 mg/dL  3-5 days..................<15.0 mg/dL  6-29 days.................<15.0 mg/dL           BUN       36         Calcium       9.0         Chloride       103         CO2       22         Creatinine       1.7         Differential Method       Automated         eGFR if        31.7         eGFR if non        27.5  Comment: Calculation used to obtain the estimated  glomerular filtration  rate (eGFR) is the CKD-EPI equation.            Eos #       0.2         Eosinophil %       1.4         Glucose       97         Gran # (ANC)       8.4         Gran %       72.7         Hematocrit       38.9         Hemoglobin       11.9         Immature Grans (Abs)       0.07  Comment: Mild elevation in immature granulocytes is non specific and   can be seen in a variety of conditions including stress response,   acute inflammation, trauma and pregnancy. Correlation with other   laboratory and clinical findings is essential.           Immature Granulocytes       0.6         Lymph #       1.9         Lymph %       16.4         Magnesium       2.1         MCH       25.2         MCHC       30.6         MCV       82         Mono #       1.0         Mono %       8.4         MPV       10.4         nRBC       0         Phosphorus       3.1         Platelets       173  Comment: Reviewed by Technologist.         POC Glucose 106   109   106           Potassium       3.1         PROTEIN TOTAL       6.4         RBC       4.72         RDW       15.0         Sodium       138         Vancomycin, Random         12.4       WBC       11.52                          02/14/22  2058        Albumin       Alkaline Phosphatase       ALT       Anion Gap       AST       Baso #       Basophil %       BILIRUBIN TOTAL       BUN       Calcium       Chloride       CO2       Creatinine       Differential Method       eGFR if        eGFR if non        Eos #       Eosinophil %       Glucose       Gran # (ANC)       Gran %       Hematocrit       Hemoglobin       Immature Grans (Abs)       Immature Granulocytes       Lymph #       Lymph %       Magnesium       MCH       MCHC       MCV       Mono #       Mono %       MPV       nRBC       Phosphorus       Platelets       POC Glucose 100       Potassium       PROTEIN TOTAL       RBC       RDW       Sodium       Vancomycin, Random       WBC              Significant Imaging: I have reviewed all pertinent imaging results/findings within the past 24 hours.      Assessment/Plan:    2/15/2022  A)  Bilateral DVT with PE  Some evidence of cognitive decline  GNR sepsis  DM 2 with improved control  HTN  No Hypotension this date  P)  Awaiting blood culture results  Continue antibiotics  Agree with Pradaxa per Dr Williamson  No notes have been filed under this hospital service.  Service: Hospital Medicine    VTE Risk Mitigation (From admission, onward)         Ordered     enoxaparin injection 70 mg  Every 24 hours (non-standard times)         02/13/22 1958                Discharge Planning   MANOJ: 2/16/2022     Code Status: Full Code   Is the patient medically ready for discharge?:     Reason for patient still in hospital (select all that apply): Treatment, Consult recommendations and Pending disposition  Discharge Plan A: Home Health                  Case Tamayo MD  Department of Hospital Medicine   [facility]

## 2022-02-16 NOTE — PLAN OF CARE
spoke with patients daughter, Jeaneth. Plan is for patient to return to [facility][1] with home health. Obtained patient choice and referral sent to [facility] home health.       02/16/22 110   Discharge Reassessment   Assessment Type Discharge Planning Reassessment   Did the patient's condition or plan change since previous assessment? No   Discharge Plan discussed with: Patient;Adult children   Discharge Plan A Home Health   Discharge Plan B Home Health   DME Needed Upon Discharge  none   Post-Acute Status   Post-Acute Authorization Home Health   Home Health Status Referrals Sent   Patient choice form signed by patient/caregiver List with quality metrics by geographic area provided

## 2022-02-17 LAB
ALBUMIN SERPL BCP-MCNC: 2.8 G/DL (ref 3.5–5.2)
ALP SERPL-CCNC: 101 U/L (ref 55–135)
ALT SERPL W/O P-5'-P-CCNC: 11 U/L (ref 10–44)
ANION GAP SERPL CALC-SCNC: 13 MMOL/L (ref 8–16)
AST SERPL-CCNC: 13 U/L (ref 10–40)
BACTERIA BLD CULT: ABNORMAL
BACTERIA UR CULT: NO GROWTH
BASOPHILS # BLD AUTO: 0.06 K/UL (ref 0–0.2)
BASOPHILS NFR BLD: 0.6 % (ref 0–1.9)
BILIRUB SERPL-MCNC: 0.8 MG/DL (ref 0.1–1)
BUN SERPL-MCNC: 29 MG/DL (ref 8–23)
CALCIUM SERPL-MCNC: 9.6 MG/DL (ref 8.7–10.5)
CHLORIDE SERPL-SCNC: 102 MMOL/L (ref 95–110)
CO2 SERPL-SCNC: 22 MMOL/L (ref 23–29)
CREAT SERPL-MCNC: 1.5 MG/DL (ref 0.5–1.4)
DIFFERENTIAL METHOD: ABNORMAL
EOSINOPHIL # BLD AUTO: 0.1 K/UL (ref 0–0.5)
EOSINOPHIL NFR BLD: 1.3 % (ref 0–8)
ERYTHROCYTE [DISTWIDTH] IN BLOOD BY AUTOMATED COUNT: 15 % (ref 11.5–14.5)
EST. GFR  (AFRICAN AMERICAN): 36.9 ML/MIN/1.73 M^2
EST. GFR  (NON AFRICAN AMERICAN): 32 ML/MIN/1.73 M^2
GLUCOSE SERPL-MCNC: 133 MG/DL (ref 70–110)
GLUCOSE SERPL-MCNC: 136 MG/DL (ref 70–110)
GLUCOSE SERPL-MCNC: 77 MG/DL (ref 70–110)
GLUCOSE SERPL-MCNC: 77 MG/DL (ref 70–110)
GLUCOSE SERPL-MCNC: 93 MG/DL (ref 70–110)
HCT VFR BLD AUTO: 38.4 % (ref 37–48.5)
HGB BLD-MCNC: 11.8 G/DL (ref 12–16)
IMM GRANULOCYTES # BLD AUTO: 0.07 K/UL (ref 0–0.04)
IMM GRANULOCYTES NFR BLD AUTO: 0.7 % (ref 0–0.5)
LYMPHOCYTES # BLD AUTO: 1.3 K/UL (ref 1–4.8)
LYMPHOCYTES NFR BLD: 13.5 % (ref 18–48)
MAGNESIUM SERPL-MCNC: 2.3 MG/DL (ref 1.6–2.6)
MCH RBC QN AUTO: 25.1 PG (ref 27–31)
MCHC RBC AUTO-ENTMCNC: 30.7 G/DL (ref 32–36)
MCV RBC AUTO: 82 FL (ref 82–98)
MONOCYTES # BLD AUTO: 0.9 K/UL (ref 0.3–1)
MONOCYTES NFR BLD: 8.8 % (ref 4–15)
NEUTROPHILS # BLD AUTO: 7.3 K/UL (ref 1.8–7.7)
NEUTROPHILS NFR BLD: 75.1 % (ref 38–73)
NRBC BLD-RTO: 0 /100 WBC
PHOSPHATE SERPL-MCNC: 2.9 MG/DL (ref 2.7–4.5)
PLATELET # BLD AUTO: 229 K/UL (ref 150–450)
PMV BLD AUTO: 10.2 FL (ref 9.2–12.9)
POTASSIUM SERPL-SCNC: 3.6 MMOL/L (ref 3.5–5.1)
POTASSIUM SERPL-SCNC: 4.5 MMOL/L (ref 3.5–5.1)
PROT SERPL-MCNC: 6.4 G/DL (ref 6–8.4)
RBC # BLD AUTO: 4.71 M/UL (ref 4–5.4)
SODIUM SERPL-SCNC: 137 MMOL/L (ref 136–145)
VANCOMYCIN SERPL-MCNC: 17.7 UG/ML
WBC # BLD AUTO: 9.67 K/UL (ref 3.9–12.7)

## 2022-02-17 PROCEDURE — 36415 COLL VENOUS BLD VENIPUNCTURE: CPT | Performed by: INTERNAL MEDICINE

## 2022-02-17 PROCEDURE — 25000003 PHARM REV CODE 250: Performed by: NURSE PRACTITIONER

## 2022-02-17 PROCEDURE — 25000003 PHARM REV CODE 250: Performed by: INTERNAL MEDICINE

## 2022-02-17 PROCEDURE — 99231 PR SUBSEQUENT HOSPITAL CARE,LEVL I: ICD-10-PCS | Mod: ,,, | Performed by: INTERNAL MEDICINE

## 2022-02-17 PROCEDURE — 97535 SELF CARE MNGMENT TRAINING: CPT

## 2022-02-17 PROCEDURE — 85025 COMPLETE CBC W/AUTO DIFF WBC: CPT | Performed by: NURSE PRACTITIONER

## 2022-02-17 PROCEDURE — 80053 COMPREHEN METABOLIC PANEL: CPT | Performed by: NURSE PRACTITIONER

## 2022-02-17 PROCEDURE — 84100 ASSAY OF PHOSPHORUS: CPT | Performed by: NURSE PRACTITIONER

## 2022-02-17 PROCEDURE — 84132 ASSAY OF SERUM POTASSIUM: CPT | Performed by: INTERNAL MEDICINE

## 2022-02-17 PROCEDURE — 12000002 HC ACUTE/MED SURGE SEMI-PRIVATE ROOM

## 2022-02-17 PROCEDURE — 63600175 PHARM REV CODE 636 W HCPCS: Performed by: INTERNAL MEDICINE

## 2022-02-17 PROCEDURE — 83735 ASSAY OF MAGNESIUM: CPT | Performed by: NURSE PRACTITIONER

## 2022-02-17 PROCEDURE — 36415 COLL VENOUS BLD VENIPUNCTURE: CPT | Performed by: NURSE PRACTITIONER

## 2022-02-17 PROCEDURE — 80202 ASSAY OF VANCOMYCIN: CPT | Performed by: INTERNAL MEDICINE

## 2022-02-17 PROCEDURE — 97110 THERAPEUTIC EXERCISES: CPT

## 2022-02-17 PROCEDURE — 99231 SBSQ HOSP IP/OBS SF/LOW 25: CPT | Mod: ,,, | Performed by: INTERNAL MEDICINE

## 2022-02-17 RX ORDER — VANCOMYCIN HCL IN 5 % DEXTROSE 1G/250ML
1000 PLASTIC BAG, INJECTION (ML) INTRAVENOUS ONCE
Status: DISCONTINUED | OUTPATIENT
Start: 2022-02-17 | End: 2022-02-17

## 2022-02-17 RX ADMIN — ASPIRIN 81 MG: 81 TABLET, DELAYED RELEASE ORAL at 09:02

## 2022-02-17 RX ADMIN — DABIGATRAN ETEXILATE MESYLATE 75 MG: 75 CAPSULE ORAL at 08:02

## 2022-02-17 RX ADMIN — Medication: at 09:02

## 2022-02-17 RX ADMIN — PIPERACILLIN SODIUM AND TAZOBACTAM SODIUM 3.38 G: 3; .375 INJECTION, POWDER, LYOPHILIZED, FOR SOLUTION INTRAVENOUS at 04:02

## 2022-02-17 RX ADMIN — PIPERACILLIN SODIUM AND TAZOBACTAM SODIUM 3.38 G: 3; .375 INJECTION, POWDER, LYOPHILIZED, FOR SOLUTION INTRAVENOUS at 08:02

## 2022-02-17 RX ADMIN — PIPERACILLIN SODIUM AND TAZOBACTAM SODIUM 3.38 G: 3; .375 INJECTION, POWDER, LYOPHILIZED, FOR SOLUTION INTRAVENOUS at 12:02

## 2022-02-17 RX ADMIN — TRAMADOL HYDROCHLORIDE 50 MG: 50 TABLET, COATED ORAL at 11:02

## 2022-02-17 RX ADMIN — TRAMADOL HYDROCHLORIDE 50 MG: 50 TABLET, COATED ORAL at 08:02

## 2022-02-17 RX ADMIN — ATORVASTATIN CALCIUM 40 MG: 40 TABLET, FILM COATED ORAL at 08:02

## 2022-02-17 RX ADMIN — DABIGATRAN ETEXILATE MESYLATE 75 MG: 75 CAPSULE ORAL at 09:02

## 2022-02-17 RX ADMIN — LATANOPROST 1 DROP: 50 SOLUTION OPHTHALMIC at 08:02

## 2022-02-17 NOTE — SUBJECTIVE & OBJECTIVE
Interval History:       Oncology Treatment Plan:   [No treatment plan]    Medications:  Continuous Infusions:  Scheduled Meds:   aspirin  81 mg Oral Daily    atorvastatin  40 mg Oral QHS    dabigatran etexilate  75 mg Oral BID    latanoprost  1 drop Both Eyes QHS    piperacillin-tazobactam (ZOSYN) IVPB  3.375 g Intravenous Q8H    polyethylene glycol  17 g Oral Daily    white petrolatum   Topical (Top) Daily     PRN Meds:acetaminophen, acetaminophen, bisacodyL, dextrose 10%, dextrose 10%, glucagon (human recombinant), glucose, glucose, hydrALAZINE, insulin aspart U-100, magnesium oxide, magnesium oxide, naloxone, ondansetron, potassium bicarbonate, potassium bicarbonate, potassium bicarbonate, potassium, sodium phosphates, potassium, sodium phosphates, potassium, sodium phosphates, sodium chloride 0.9%, traMADoL, Pharmacy to dose Vancomycin consult **AND** vancomycin - pharmacy to dose     Review of Systems   Constitutional: Negative for fever.   Respiratory: Negative for shortness of breath.    Cardiovascular: Negative for chest pain and leg swelling.   Gastrointestinal: Negative for abdominal pain and blood in stool.   Genitourinary: Negative for hematuria.   Skin: Negative for rash.     Objective:     Vital Signs (Most Recent):  Temp: 98.4 °F (36.9 °C) (02/17/22 1059)  Pulse: 97 (02/17/22 1059)  Resp: 16 (02/17/22 1059)  BP: (!) 129/90 (02/17/22 1059)  SpO2: 95 % (02/17/22 1059) Vital Signs (24h Range):  Temp:  [97.8 °F (36.6 °C)-98.5 °F (36.9 °C)] 98.4 °F (36.9 °C)  Pulse:  [] 97  Resp:  [16-17] 16  SpO2:  [95 %-100 %] 95 %  BP: (101-129)/(76-90) 129/90     Weight: 62.1 kg (136 lb 14.5 oz)  Body mass index is 25.04 kg/m².  Body surface area is 1.65 meters squared.    No intake or output data in the 24 hours ending 02/17/22 1321    Physical Exam  Constitutional:       Appearance: Normal appearance.   HENT:      Head: Normocephalic and atraumatic.      Right Ear: External ear normal.      Left Ear:  External ear normal.   Eyes:      Conjunctiva/sclera: Conjunctivae normal.   Cardiovascular:      Rate and Rhythm: Normal rate and regular rhythm.      Heart sounds: Normal heart sounds.   Pulmonary:      Effort: Pulmonary effort is normal.      Breath sounds: Normal breath sounds.   Abdominal:      General: Abdomen is flat.      Palpations: Abdomen is soft.         Significant Labs:   CBC:   Recent Labs   Lab 02/16/22  0521 02/17/22  0637   WBC 9.93 9.67   HGB 12.3 11.8*   HCT 39.0 38.4    229    and CMP:   Recent Labs   Lab 02/16/22  0521 02/17/22  0637    137   K 3.5 3.6    102   CO2 22* 22*   GLU 92 77   BUN 33* 29*   CREATININE 1.6* 1.5*   CALCIUM 9.4 9.6   PROT 6.6 6.4   ALBUMIN 2.9* 2.8*   BILITOT 1.0 0.8   ALKPHOS 98 101   AST 18 13   ALT 13 11   ANIONGAP 11 13   EGFRNONAA 29.6* 32.0*       Diagnostic Results:  None

## 2022-02-17 NOTE — SUBJECTIVE & OBJECTIVE
Interval History: Ms Clifford has A baulmannii sepsis but is generally assymptomatic    Review of Systems   Constitutional: Positive for fatigue.   HENT: Negative.    Eyes: Negative.    Respiratory:        RODRIGUEZ   Cardiovascular: Negative.    Gastrointestinal: Negative.    Endocrine: Positive for heat intolerance.   Genitourinary: Negative.    Musculoskeletal: Positive for arthralgias, gait problem and myalgias.   Skin: Negative.    Allergic/Immunologic: Negative.    Neurological: Positive for weakness.   Hematological: Bruises/bleeds easily.   Psychiatric/Behavioral: Positive for dysphoric mood. The patient is nervous/anxious.      Objective:     Vital Signs (Most Recent):  Temp: 98.4 °F (36.9 °C) (02/17/22 1059)  Pulse: 97 (02/17/22 1059)  Resp: 16 (02/17/22 1059)  BP: (!) 129/90 (02/17/22 1059)  SpO2: 95 % (02/17/22 1059) Vital Signs (24h Range):  Temp:  [97.8 °F (36.6 °C)-98.5 °F (36.9 °C)] 98.4 °F (36.9 °C)  Pulse:  [] 97  Resp:  [16-17] 16  SpO2:  [95 %-100 %] 95 %  BP: (101-129)/(76-90) 129/90     Weight: 62.1 kg (136 lb 14.5 oz)  Body mass index is 25.04 kg/m².  No intake or output data in the 24 hours ending 02/17/22 1517   Physical Exam  Vitals and nursing note reviewed.   Constitutional:       General: She is not in acute distress.  HENT:      Head: Normocephalic and atraumatic.      Nose: Nose normal.      Mouth/Throat:      Mouth: Mucous membranes are moist.   Eyes:      Extraocular Movements: Extraocular movements intact.      Pupils: Pupils are equal, round, and reactive to light.   Cardiovascular:      Rate and Rhythm: Normal rate and regular rhythm.   Pulmonary:      Effort: Pulmonary effort is normal.      Breath sounds: Normal breath sounds.   Abdominal:      General: Bowel sounds are normal. There is no distension.      Tenderness: There is no abdominal tenderness. There is no guarding.   Musculoskeletal:      Cervical back: Normal range of motion and neck supple.      Comments: 4/5  generalized muscle strength   Skin:     General: Skin is warm.   Neurological:      General: No focal deficit present.      Mental Status: She is alert.   Psychiatric:      Comments: Dysphoric mood         Significant Labs:   All pertinent labs within the past 24 hours have been reviewed.  Recent Lab Results       02/17/22  1137   02/17/22  0948   02/17/22  0724   02/17/22  0637   02/16/22  2100        Albumin       2.8         Alkaline Phosphatase       101         ALT       11         Anion Gap       13         AST       13         Baso #       0.06         Basophil %       0.6         BILIRUBIN TOTAL       0.8  Comment: For infants and newborns, interpretation of results should be based  on gestational age, weight and in agreement with clinical  observations.    Premature Infant recommended reference ranges:  Up to 24 hours.............<8.0 mg/dL  Up to 48 hours............<12.0 mg/dL  3-5 days..................<15.0 mg/dL  6-29 days.................<15.0 mg/dL           BUN       29         Calcium       9.6         Chloride       102         CO2       22         Creatinine       1.5         Differential Method       Automated         eGFR if        36.9         eGFR if non        32.0  Comment: Calculation used to obtain the estimated glomerular filtration  rate (eGFR) is the CKD-EPI equation.            Eos #       0.1         Eosinophil %       1.3         Glucose       77         Gran # (ANC)       7.3         Gran %       75.1         Hematocrit       38.4         Hemoglobin       11.8         Immature Grans (Abs)       0.07  Comment: Mild elevation in immature granulocytes is non specific and   can be seen in a variety of conditions including stress response,   acute inflammation, trauma and pregnancy. Correlation with other   laboratory and clinical findings is essential.           Immature Granulocytes       0.7         Lymph #       1.3         Lymph %       13.5          Magnesium       2.3         MCH       25.1         MCHC       30.7         MCV       82         Mono #       0.9         Mono %       8.8         MPV       10.2         nRBC       0         Phosphorus       2.9         Platelets       229         POC Glucose 93     77     104       Potassium       3.6         PROTEIN TOTAL       6.4         RBC       4.71         RDW       15.0         Sodium       137         Vancomycin, Random   17.7             WBC       9.67                          02/16/22  1621        Albumin       Alkaline Phosphatase       ALT       Anion Gap       AST       Baso #       Basophil %       BILIRUBIN TOTAL       BUN       Calcium       Chloride       CO2       Creatinine       Differential Method       eGFR if        eGFR if non        Eos #       Eosinophil %       Glucose       Gran # (ANC)       Gran %       Hematocrit       Hemoglobin       Immature Grans (Abs)       Immature Granulocytes       Lymph #       Lymph %       Magnesium       MCH       MCHC       MCV       Mono #       Mono %       MPV       nRBC       Phosphorus       Platelets       POC Glucose 106       Potassium       PROTEIN TOTAL       RBC       RDW       Sodium       Vancomycin, Random       WBC             Significant Imaging: I have reviewed all pertinent imaging results/findings within the past 24 hours.

## 2022-02-17 NOTE — ASSESSMENT & PLAN NOTE
Patient is feeling well today.  She is now on Pradaxa and seems to be doing well with this.  Will plan on seeing her in the office after discharge and will make this appointment.

## 2022-02-17 NOTE — PROGRESS NOTES
After consultation with and further review we determined the next random Vancomycin level will be drawn on 02/19/2022 at 0100 verses 1300 on 02/18/2022.  Trending analysis revealed the 36 hour increment for laboratory vancomycin level would give the best information.

## 2022-02-17 NOTE — PROGRESS NOTES
[facility]  Hematology/Oncology  Progress Note    Patient Name: Azul Clifford  Admission Date: 2/13/2022  Hospital Length of Stay: 4 days  Code Status: Full Code     Subjective:     HPI:  Patient feeling ok today.  No new complaints.       Interval History:       Oncology Treatment Plan:   [No treatment plan]    Medications:  Continuous Infusions:  Scheduled Meds:   aspirin  81 mg Oral Daily    atorvastatin  40 mg Oral QHS    dabigatran etexilate  75 mg Oral BID    latanoprost  1 drop Both Eyes QHS    piperacillin-tazobactam (ZOSYN) IVPB  3.375 g Intravenous Q8H    polyethylene glycol  17 g Oral Daily    white petrolatum   Topical (Top) Daily     PRN Meds:acetaminophen, acetaminophen, bisacodyL, dextrose 10%, dextrose 10%, glucagon (human recombinant), glucose, glucose, hydrALAZINE, insulin aspart U-100, magnesium oxide, magnesium oxide, naloxone, ondansetron, potassium bicarbonate, potassium bicarbonate, potassium bicarbonate, potassium, sodium phosphates, potassium, sodium phosphates, potassium, sodium phosphates, sodium chloride 0.9%, traMADoL, Pharmacy to dose Vancomycin consult **AND** vancomycin - pharmacy to dose     Review of Systems   Constitutional: Negative for fever.   Respiratory: Negative for shortness of breath.    Cardiovascular: Negative for chest pain and leg swelling.   Gastrointestinal: Negative for abdominal pain and blood in stool.   Genitourinary: Negative for hematuria.   Skin: Negative for rash.     Objective:     Vital Signs (Most Recent):  Temp: 98.4 °F (36.9 °C) (02/17/22 1059)  Pulse: 97 (02/17/22 1059)  Resp: 16 (02/17/22 1059)  BP: (!) 129/90 (02/17/22 1059)  SpO2: 95 % (02/17/22 1059) Vital Signs (24h Range):  Temp:  [97.8 °F (36.6 °C)-98.5 °F (36.9 °C)] 98.4 °F (36.9 °C)  Pulse:  [] 97  Resp:  [16-17] 16  SpO2:  [95 %-100 %] 95 %  BP: (101-129)/(76-90) 129/90     Weight: 62.1 kg (136 lb 14.5 oz)  Body mass index is 25.04 kg/m².  Body surface area is 1.65  meters squared.    No intake or output data in the 24 hours ending 02/17/22 1321    Physical Exam  Constitutional:       Appearance: Normal appearance.   HENT:      Head: Normocephalic and atraumatic.      Right Ear: External ear normal.      Left Ear: External ear normal.   Eyes:      Conjunctiva/sclera: Conjunctivae normal.   Cardiovascular:      Rate and Rhythm: Normal rate and regular rhythm.      Heart sounds: Normal heart sounds.   Pulmonary:      Effort: Pulmonary effort is normal.      Breath sounds: Normal breath sounds.   Abdominal:      General: Abdomen is flat.      Palpations: Abdomen is soft.         Significant Labs:   CBC:   Recent Labs   Lab 02/16/22  0521 02/17/22  0637   WBC 9.93 9.67   HGB 12.3 11.8*   HCT 39.0 38.4    229    and CMP:   Recent Labs   Lab 02/16/22  0521 02/17/22  0637    137   K 3.5 3.6    102   CO2 22* 22*   GLU 92 77   BUN 33* 29*   CREATININE 1.6* 1.5*   CALCIUM 9.4 9.6   PROT 6.6 6.4   ALBUMIN 2.9* 2.8*   BILITOT 1.0 0.8   ALKPHOS 98 101   AST 18 13   ALT 13 11   ANIONGAP 11 13   EGFRNONAA 29.6* 32.0*       Diagnostic Results:  None    Assessment/Plan:     CKD (chronic kidney disease), stage III  Creatinine appears stable at 1.5 today.  Will continue to monitor this now and as OP.      Bilateral pulmonary embolism  Patient is feeling well today.  She is now on Pradaxa and seems to be doing well with this.  Will plan on seeing her in the office after discharge and will make this appointment.          Thank you for your consult. I will follow-up with patient. Please contact us if you have any additional questions.     Storm Velazquez MD  Hematology/Oncology  [facility]

## 2022-02-17 NOTE — PT/OT/SLP PROGRESS
Physical Therapy Treatment    Patient Name:  Azul Clifford   MRN:  [id number]    Recommendations:     Discharge Recommendations:  home health PT   Discharge Equipment Recommendations: walker, rolling   Barriers to discharge: None    Assessment:     Azul Clifford is a 81 y.o. female admitted with a medical diagnosis of Acute bilateral deep vein thrombosis (DVT) of femoral veins.  She presents with the following impairments/functional limitations:  weakness,impaired endurance,impaired self care skills,impaired functional mobilty,gait instability,impaired balance,pain.    Pt agreeable to PT treatment. Pt performed bed mobility SBA and seated therex for B LEs seated EOB. Pt stood up and took a few steps EOB before reporting increase in LLE pain.    Rehab Prognosis: Fair; patient would benefit from acute skilled PT services to address these deficits and reach maximum level of function.    Recent Surgery: * No surgery found *      Plan:     During this hospitalization, patient to be seen 5 x/week to address the identified rehab impairments via gait training,therapeutic activities,therapeutic exercises and progress toward the following goals:    · Plan of Care Expires:  03/16/22    Subjective     Chief Complaint: leg pain  Patient/Family Comments/goals: to go home  Pain/Comfort:  · Pain Rating 1: 0/10  · Pain Addressed 1: Reposition,Distraction  · Pain Rating Post-Intervention 1:  (increased LLE pain, unable to rate)      Objective:     Communicated with RN prior to session.  Patient found supine with bed alarm,telemetry,peripheral IV upon PT entry to room.     General Precautions: Standard, fall   Orthopedic Precautions:N/A   Braces: N/A  Respiratory Status: Room air     Functional Mobility:  · Bed Mobility:     · Supine to Sit: stand by assistance  · Sit to Supine: stand by assistance  · Transfers:     · Sit to Stand:  contact guard assistance with hand-held assist  · Gait: 3 side steps EOB with HHA    Therapeutic  Activities and Exercises:   Education, poc, dc planning, fall prevention. Pt educated on importance of OOB activity to negate effects of prolonged bed rest.    Patient left supine with all lines intact, call button in reach and bed alarm on.    GOALS:   Multidisciplinary Problems     Physical Therapy Goals        Problem: Physical Therapy Goal    Goal Priority Disciplines Outcome Goal Variances Interventions   Physical Therapy Goal     PT, PT/OT Ongoing, Progressing     Description: Goals to be met by: discharge     Patient will increase functional independence with mobility by performin. Supine to sit with Modified [place]  2. Sit to stand transfer with Modified [place]  3. Gait  x 50 feet with Stand-by Assistance using Rolling Walker.                      Time Tracking:     PT Received On: 22  PT Start Time: 1015     PT Stop Time: 1027  PT Total Time (min): 12 min     Billable Minutes: Therapeutic Exercise 12    Treatment Type: Treatment  PT/PTA: PT     PTA Visit Number: 0     2022

## 2022-02-17 NOTE — PROGRESS NOTES
[facility] Medicine  Progress Note    Patient Name: Azul Clifford  MRN: [id number]  Patient Class: IP- Inpatient   Admission Date: 2/13/2022  Length of Stay: 4 days  Attending Physician: Case Tamayo MD  Primary Care Provider: Andrew Francisco Iv, MD        Subjective:     Principal Problem:Acute bilateral deep vein thrombosis (DVT) of femoral veins        HPI:  Azul Clifford is a 81 y.o. Black or  female with known history of an, diabetes mellitus, DVT, PE/DVT (noncompliance with anticoagulation), hyperlipidemia who presented with a primary complaint of Loss of Consciousness (Found unresponsive, hypoxic to 60s in hallway)  History was obtained from the patient and collateral obtained from the family present at the bedside and ER physician Sign-out.      States that she did not feel well yesterday, having left leg pain, abdominal pain/constipation.  Also reports mild shortness of breath.  Denies fever, chills, chest pain, nausea, vomiting, diarrhea, urinary symptoms.  Slept a lot yesterday.  Has not been taking medication as prescribed, especially her blood thinner.     Earlier today, she walked to open the door when her daughter got to her house. She walked back and sat on a chair. She complained of being tired and shortly became unresponsive - stop talking and not answering questions. EMS was activated and she subsequently brought to the ED.      Per ED physician, EMS reported that was awake, alert, and oriented with stable vital signs.  She became shortness of breath, tachycardic, with SpO2 in the 60 in the ED hallway  CBC - WBC 15.78, hemoglobin 14.4, hematocrit 45.7, platelet 187  CMP - potassium 3.6, BUN 30, creatinine 2.0, estimated GFR 26, glucose 183  BNP -38  Troponin - 0.032  COVID 19 - Negative  Lactic acid elevated 4.3  EKG - sinus tachycardia, rate 113, left ventricular hypertrophy, no ST elevation  CXR -  IMPRESSION:  1. No acute pulmonary process.  2.  Prominence of the aortic knob suggestive of an aneurysm or dissection, new/worse when compared to the previous exam, consider dedicated contrast-enhanced CT follow-up.  NM lung scan - Intermediate-to high probability for acute PE.  CT Head without contrast - no acute intracranial process  CT cervical spine - no acute fracture or traumatic malalignment the cervical spine  CT head abdomen pelvis -  IMPRESSION:  1. Prominence of the left greater than right lower extremity deep venous vessels from the common iliac to the visualized proximal thigh with mild adjacent fat stranding suspicious for an occult deep venous thrombus and adjacent edema, consider correlation with lower extremity Doppler ultrasound.  2. Fusiform infrarenal abdominal aortic aneurysm measuring 3.4 cm in diameter, there is mild fat stranding along the distal abdominal aorta and iliacs, possibly from underlying deep venous thrombosis, noting that aortic pathology is also a consideration (aortitis, or less likely a leaking aneurysm).  3. 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  4. Mild cardiomegaly and scattered coronary arterial calcifications (3 vessel disease).  5. Large stool ball in the rectal vault, consider correlation for fecal impaction, and/or constipation, with a moderate volume of stool and gas in the remainder the colon.  6. Small nonobstructing left-sided renal stone measuring 2 mm.  7. Sludge in the gallbladder without findings of acute cholecystitis.  8. Numerous additional, and incidental findings as noted above.  US venous LEs -  Extensive DVT bilaterally, occlusive throughout the left lower extremity and occlusive in the right femoral vein.      Overview/Hospital Course:  Patient reports 2 day history of bilateral leg pain and weakness, possibly shortness of breath but poor historian.  States she lives alone.  Reportedly had syncopal episode with collapse, in the ED was noted to be  hypoxic, noncompliant with anticoagulation.  Saturations improved, vital stable, lactic acid 4.3 but improved to 1.9.  Lower extremity with extensive bilateral DVT, CKD stage 3, V/Q scan with intermediate to high probability acute PE, started on therapeutic dose Lovenox.  On 02/14, hemodynamically stable, echocardiogram pending, 1/2 blood culture preliminary with GPC (on vancomycin), vascular and Hematology consult pending.    02/15/2022  Ms Cilfford notes some leg pain. She is a poor historian    2/16/2022  Ms Clifford still feels weak with occasional leg cramps. She denies fever, chills SOB or chest pain. Per her family she will live at [facility] with a family member staying with her     1/17/2022  Doctor I want to get out of here. 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   HENT: Negative.    Eyes: Negative.    Respiratory:        RODRIGUEZ   Cardiovascular: Negative.    Gastrointestinal: Negative.    Endocrine: Positive for heat intolerance.   Genitourinary: Negative.    Musculoskeletal: Positive for arthralgias, gait problem and myalgias.   Skin: Negative.    Allergic/Immunologic: Negative.    Neurological: Positive for weakness.   Hematological: Bruises/bleeds easily.   Psychiatric/Behavioral: Positive for dysphoric mood. The patient is nervous/anxious.      Objective:     Vital Signs (Most Recent):  Temp: 98.4 °F (36.9 °C) (02/17/22 1059)  Pulse: 97 (02/17/22 1059)  Resp: 16 (02/17/22 1059)  BP: (!) 129/90 (02/17/22 1059)  SpO2: 95 % (02/17/22 1059) Vital Signs (24h Range):  Temp:  [97.8 °F (36.6 °C)-98.5 °F (36.9 °C)] 98.4 °F (36.9 °C)  Pulse:  [] 97  Resp:  [16-17] 16  SpO2:  [95 %-100 %] 95 %  BP: (101-129)/(76-90) 129/90     Weight: 62.1 kg (136 lb 14.5 oz)  Body mass index is 25.04 kg/m².  No intake or output data in the 24 hours ending 02/17/22  1517   Physical Exam  Vitals and nursing note reviewed.   Constitutional:       General: She is not in acute distress.  HENT:      Head: Normocephalic and atraumatic.      Nose: Nose normal.      Mouth/Throat:      Mouth: Mucous membranes are moist.   Eyes:      Extraocular Movements: Extraocular movements intact.      Pupils: Pupils are equal, round, and reactive to light.   Cardiovascular:      Rate and Rhythm: Normal rate and regular rhythm.   Pulmonary:      Effort: Pulmonary effort is normal.      Breath sounds: Normal breath sounds.   Abdominal:      General: Bowel sounds are normal. There is no distension.      Tenderness: There is no abdominal tenderness. There is no guarding.   Musculoskeletal:      Cervical back: Normal range of motion and neck supple.      Comments: 4/5 generalized muscle strength   Skin:     General: Skin is warm.   Neurological:      General: No focal deficit present.      Mental Status: She is alert.   Psychiatric:      Comments: Dysphoric mood         Significant Labs:   All pertinent labs within the past 24 hours have been reviewed.  Recent Lab Results       02/17/22  1137   02/17/22  0948   02/17/22  0724   02/17/22  0637   02/16/22  2100        Albumin       2.8         Alkaline Phosphatase       101         ALT       11         Anion Gap       13         AST       13         Baso #       0.06         Basophil %       0.6         BILIRUBIN TOTAL       0.8  Comment: For infants and newborns, interpretation of results should be based  on gestational age, weight and in agreement with clinical  observations.    Premature Infant recommended reference ranges:  Up to 24 hours.............<8.0 mg/dL  Up to 48 hours............<12.0 mg/dL  3-5 days..................<15.0 mg/dL  6-29 days.................<15.0 mg/dL           BUN       29         Calcium       9.6         Chloride       102         CO2       22         Creatinine       1.5         Differential Method       Automated          eGFR if        36.9         eGFR if non        32.0  Comment: Calculation used to obtain the estimated glomerular filtration  rate (eGFR) is the CKD-EPI equation.            Eos #       0.1         Eosinophil %       1.3         Glucose       77         Gran # (ANC)       7.3         Gran %       75.1         Hematocrit       38.4         Hemoglobin       11.8         Immature Grans (Abs)       0.07  Comment: Mild elevation in immature granulocytes is non specific and   can be seen in a variety of conditions including stress response,   acute inflammation, trauma and pregnancy. Correlation with other   laboratory and clinical findings is essential.           Immature Granulocytes       0.7         Lymph #       1.3         Lymph %       13.5         Magnesium       2.3         MCH       25.1         MCHC       30.7         MCV       82         Mono #       0.9         Mono %       8.8         MPV       10.2         nRBC       0         Phosphorus       2.9         Platelets       229         POC Glucose 93     77     104       Potassium       3.6         PROTEIN TOTAL       6.4         RBC       4.71         RDW       15.0         Sodium       137         Vancomycin, Random   17.7             WBC       9.67                          02/16/22  1621        Albumin       Alkaline Phosphatase       ALT       Anion Gap       AST       Baso #       Basophil %       BILIRUBIN TOTAL       BUN       Calcium       Chloride       CO2       Creatinine       Differential Method       eGFR if        eGFR if non        Eos #       Eosinophil %       Glucose       Gran # (ANC)       Gran %       Hematocrit       Hemoglobin       Immature Grans (Abs)       Immature Granulocytes       Lymph #       Lymph %       Magnesium       MCH       MCHC       MCV       Mono #       Mono %       MPV       nRBC       Phosphorus       Platelets       POC Glucose 106       Potassium        PROTEIN TOTAL       RBC       RDW       Sodium       Vancomycin, Random       WBC             Significant Imaging: I have reviewed all pertinent imaging results/findings within the past 24 hours.      Assessment/Plan:    2/17/2022  A)  A baulmannii sepsis with stable vital signs  JAMIE resolving  Acute Bilateral DVT and PE  AAA  HTN  P)  ID consulted  Hematology input appreciated  Continue current therapy  Informed infection control nurse of its presence      No notes have been filed under this hospital service.  Service: Hospital Medicine    VTE Risk Mitigation (From admission, onward)         Ordered     dabigatran etexilate capsule 75 mg  2 times daily         02/16/22 1102                Discharge Planning   MANOJ: 2/19/2022     Code Status: Full Code   Is the patient medically ready for discharge?:     Reason for patient still in hospital (select all that apply): Treatment, Consult recommendations and Pending disposition New problem  Discharge Plan A: Home Health                  Case Tamayo MD  Department of Hospital Medicine   [facility]

## 2022-02-17 NOTE — PT/OT/SLP PROGRESS
Occupational Therapy   Treatment    Name: Azul Clifford  MRN: [id number]  Admitting Diagnosis:  Acute bilateral deep vein thrombosis (DVT) of femoral veins       Recommendations:     Discharge Recommendations: home health OT  Discharge Equipment Recommendations:   (TBD)  Barriers to discharge:  None    Assessment:     Azul Clifford is a 81 y.o. female with a medical diagnosis of Acute bilateral deep vein thrombosis (DVT) of femoral veins.  She presents with RLE pain during standing ADL activity. Patient participated in bed mobility, unsupported sitting EOB, LB dressing sitting EOB, but unable to perform grooming activity standing at sink due to RLE pain during standing. Performance deficits affecting function are weakness,impaired endurance,impaired self care skills,impaired functional mobilty,gait instability,impaired balance.     Rehab Prognosis:  Fair; patient would benefit from acute skilled OT services to address these deficits and reach maximum level of function.       Plan:     Patient to be seen 5 x/week to address the above listed problems via self-care/home management,therapeutic activities,therapeutic exercises  · Plan of Care Expires: 03/16/22  · Plan of Care Reviewed with: patient    Subjective     Pain/Comfort:  · Pain Rating 1: 0/10  · Location - Side 1: Right  · Location 1: leg  · Pain Rating Post-Intervention 1: 8/10    Objective:     Communicated with: nurse prior to session.  Patient found HOB elevated with bed alarm,telemetry,peripheral IV upon OT entry to room.    General Precautions: Standard, fall   Orthopedic Precautions:N/A   Braces: N/A  Respiratory Status: Room air     Occupational Performance:     Bed Mobility:    · Patient completed Scooting/Bridging with stand by assistance  · Patient completed Supine to Sit with stand by assistance  · Patient completed Sit to Supine with stand by assistance   · Performed unsupported sitting EOB with stand by assistance.    Functional  Mobility/Transfers:  · Patient completed Sit <> Stand Transfer with stand by assistance  with  rolling walker   · Functional Mobility: ambulated 15 feet in hospital room with contact guard assistance using rolling walker.    Activities of Daily Living:  · Grooming: stand by assistance to don/doff socks sitting EOB.  · Lower Body Dressing: total assistance unabe to perform standing activity due to pain in RLE while standing.      [facility] 6 Click ADL: 19    Treatment & Education:  Patient reports significant pain to RLE after ambulating to sink to perform grooming task. Pain resolved once patient sat down EOB. Unable to tolerate standing ADL activity.    Patient left HOB elevated with all lines intact, call button in reach, bed alarm on and family presentEducation:      GOALS:   Multidisciplinary Problems     Occupational Therapy Goals        Problem: Occupational Therapy Goal    Goal Priority Disciplines Outcome Interventions   Occupational Therapy Goal     OT, PT/OT Ongoing, Progressing    Description: Goals to be met by: discharge    Patient will increase functional independence with ADLs by performing:    UE Dressing with Supervision.  LE Dressing with Supervision.  Grooming while standing at sink with Supervision.  Toileting from toilet with Supervision for hygiene and clothing management.   Toilet transfer to toilet with Supervision.                     Time Tracking:     OT Date of Treatment: 02/17/22  OT Start Time: 1153  OT Stop Time: 1205  OT Total Time (min): 12 min    Billable Minutes:Self Care/Home Management 12    OT/AMNA: OT          2/17/2022

## 2022-02-17 NOTE — PROGRESS NOTES
Pharmacokinetic Assessment Follow Up: IV Vancomycin    Vancomycin serum concentration assessment(s):    The random level was drawn correctly and can be used to guide therapy at this time. The measurement is within the desired definitive target range of 15 to 20 mcg/mL.    Vancomycin Regimen Plan:    Re-dose when the random level is less than 20 mcg/mL, next level to be drawn at 1300 on 2/18/2022    Drug levels (last 3 results):  Recent Labs   Lab Result Units 02/14/22  2125 02/15/22  2219 02/17/22  0948   Vancomycin, Random ug/mL 12.4 16.2 17.7       Pharmacy will continue to follow and monitor vancomycin.    Please contact pharmacy at extension [phone] for questions regarding this assessment.    Thank you for the consult,   Ronnie Kovacs       Patient brief summary:  Azul Clifford is a 81 y.o. female initiated on antimicrobial therapy with IV Vancomycin for treatment of sepsis & bacteremia    The patient's current regimen is Vancomycin 1000 mg every 24 hours if the level is within accepted protocol levels in this case Vancomycin Trough at 15-20 mcg / ml.  Random Vancomycin level to be drawn at 1300 on 2/18/2022. Dosing will be per protocol as needed.    Drug Allergies:   Review of patient's allergies indicates:  No Known Allergies    Actual Body Weight:   62.1 kg    Renal Function:   Estimated Creatinine Clearance: 24.6 mL/min (A) (based on SCr of 1.5 mg/dL (H)).,     Dialysis Method (if applicable):  N/A    CBC (last 72 hours):  Recent Labs   Lab Result Units 02/15/22  0521 02/16/22  0521 02/17/22  0637   WBC K/uL 11.52 9.93 9.67   Hemoglobin g/dL 11.9* 12.3 11.8*   Hematocrit % 38.9 39.0 38.4   Platelets K/uL 173 213 229   Gran % % 72.7 73.0 75.1*   Lymph % % 16.4* 16.7* 13.5*   Mono % % 8.4 7.6 8.8   Eosinophil % % 1.4 1.0 1.3   Basophil % % 0.5 0.9 0.6   Differential Method  Automated Automated Automated       Metabolic Panel (last 72 hours):  Recent Labs   Lab Result Units 02/15/22  0521 02/16/22  0521  02/17/22  0637   Sodium mmol/L 138 137 137   Potassium mmol/L 3.1* 3.5 3.6   Chloride mmol/L 103 104 102   CO2 mmol/L 22* 22* 22*   Glucose mg/dL 97 92 77   BUN mg/dL 36* 33* 29*   Creatinine mg/dL 1.7* 1.6* 1.5*   Albumin g/dL 2.8* 2.9* 2.8*   Total Bilirubin mg/dL 1.1* 1.0 0.8   Alkaline Phosphatase U/L 94 98 101   AST U/L 19 18 13   ALT U/L 11 13 11   Magnesium mg/dL 2.1 2.2 2.3   Phosphorus mg/dL 3.1 3.0 2.9       Vancomycin Administrations:  vancomycin given in the last 96 hours                     vancomycin in dextrose 5 % 1 gram/250 mL IVPB 1,000 mg (mg) 1,000 mg New Bag 02/16/22 1140    vancomycin in dextrose 5 % 1 gram/250 mL IVPB 1,000 mg (mg) 1,000 mg New Bag 02/15/22 0113    vancomycin 500 mg in dextrose 5 % 100 mL IVPB (ready to mix system) (mg) 500 mg New Bag 02/13/22 1926    vancomycin in dextrose 5 % 1 gram/250 mL IVPB 1,000 mg (mg) 1,000 mg New Bag 02/13/22 1926                    Microbiologic Results:  Microbiology Results (last 7 days)       Procedure Component Value Units Date/Time    Urine culture [[id number]] Collected: 02/14/22 0935    Order Status: Completed Specimen: Urine Updated: 02/17/22 0913     Urine Culture, Routine No growth    Narrative:      Specimen Source->Urine    Blood culture #2 **CANNOT BE ORDERED STAT** [[id number]]  (Abnormal)  (Susceptibility) Collected: 02/13/22 1702    Order Status: Completed Specimen: Blood from Peripheral, Forearm, Right Updated: 02/17/22 0901     Blood Culture, Routine Gram stain aer bottle: Gram positive cocci      Results called to and read back by:Corrie Currie [id number];        02/14/2022  10:02 DIDID      Previous result of  Gram positive cocci is incorrect. The correct result       is  Gram negative coccobacilli.      Results reviewed and amended. Called to and read back by: JUAN CARLOS FerrerJAVAN; CJD      ACINETOBACTER BAUMANNII/HAEMOLYTICUS    Blood culture #1 **CANNOT BE ORDERED STAT** [[id number]] Collected: 02/13/22 6762     Order Status: Completed Specimen: Blood from Peripheral, Forearm, Right Updated: 02/16/22 1832     Blood Culture, Routine No Growth to date      No Growth to date      No Growth to date      No Growth to date

## 2022-02-18 VITALS
OXYGEN SATURATION: 95 % | DIASTOLIC BLOOD PRESSURE: 89 MMHG | HEART RATE: 107 BPM | RESPIRATION RATE: 17 BRPM | SYSTOLIC BLOOD PRESSURE: 151 MMHG | TEMPERATURE: 98 F | HEIGHT: 62 IN | BODY MASS INDEX: 25.19 KG/M2 | WEIGHT: 136.88 LBS

## 2022-02-18 LAB
ALBUMIN SERPL BCP-MCNC: 2.7 G/DL (ref 3.5–5.2)
ALP SERPL-CCNC: 101 U/L (ref 55–135)
ALT SERPL W/O P-5'-P-CCNC: 10 U/L (ref 10–44)
ANION GAP SERPL CALC-SCNC: 10 MMOL/L (ref 8–16)
AST SERPL-CCNC: 12 U/L (ref 10–40)
BACTERIA BLD CULT: NORMAL
BASOPHILS # BLD AUTO: 0.09 K/UL (ref 0–0.2)
BASOPHILS NFR BLD: 0.9 % (ref 0–1.9)
BILIRUB SERPL-MCNC: 0.7 MG/DL (ref 0.1–1)
BUN SERPL-MCNC: 24 MG/DL (ref 8–23)
CALCIUM SERPL-MCNC: 9.5 MG/DL (ref 8.7–10.5)
CEA SERPL-MCNC: 3.4 NG/ML (ref 0–5)
CHLORIDE SERPL-SCNC: 103 MMOL/L (ref 95–110)
CO2 SERPL-SCNC: 24 MMOL/L (ref 23–29)
CREAT SERPL-MCNC: 1.5 MG/DL (ref 0.5–1.4)
DIFFERENTIAL METHOD: ABNORMAL
EOSINOPHIL # BLD AUTO: 0.1 K/UL (ref 0–0.5)
EOSINOPHIL NFR BLD: 1.4 % (ref 0–8)
ERYTHROCYTE [DISTWIDTH] IN BLOOD BY AUTOMATED COUNT: 14.9 % (ref 11.5–14.5)
EST. GFR  (AFRICAN AMERICAN): 36.9 ML/MIN/1.73 M^2
EST. GFR  (NON AFRICAN AMERICAN): 32 ML/MIN/1.73 M^2
GLUCOSE SERPL-MCNC: 103 MG/DL (ref 70–110)
GLUCOSE SERPL-MCNC: 134 MG/DL (ref 70–110)
GLUCOSE SERPL-MCNC: 141 MG/DL (ref 70–110)
GLUCOSE SERPL-MCNC: 99 MG/DL (ref 70–110)
HCT VFR BLD AUTO: 39.5 % (ref 37–48.5)
HGB BLD-MCNC: 12 G/DL (ref 12–16)
IMM GRANULOCYTES # BLD AUTO: 0.07 K/UL (ref 0–0.04)
IMM GRANULOCYTES NFR BLD AUTO: 0.7 % (ref 0–0.5)
LYMPHOCYTES # BLD AUTO: 0.9 K/UL (ref 1–4.8)
LYMPHOCYTES NFR BLD: 9.7 % (ref 18–48)
MAGNESIUM SERPL-MCNC: 2.3 MG/DL (ref 1.6–2.6)
MCH RBC QN AUTO: 25.4 PG (ref 27–31)
MCHC RBC AUTO-ENTMCNC: 30.4 G/DL (ref 32–36)
MCV RBC AUTO: 84 FL (ref 82–98)
MONOCYTES # BLD AUTO: 0.8 K/UL (ref 0.3–1)
MONOCYTES NFR BLD: 8.6 % (ref 4–15)
NEUTROPHILS # BLD AUTO: 7.5 K/UL (ref 1.8–7.7)
NEUTROPHILS NFR BLD: 78.7 % (ref 38–73)
NRBC BLD-RTO: 0 /100 WBC
PHOSPHATE SERPL-MCNC: 2.9 MG/DL (ref 2.7–4.5)
PLATELET # BLD AUTO: 262 K/UL (ref 150–450)
PMV BLD AUTO: 10 FL (ref 9.2–12.9)
POTASSIUM SERPL-SCNC: 4 MMOL/L (ref 3.5–5.1)
PROT SERPL-MCNC: 6.7 G/DL (ref 6–8.4)
RBC # BLD AUTO: 4.73 M/UL (ref 4–5.4)
SODIUM SERPL-SCNC: 137 MMOL/L (ref 136–145)
WBC # BLD AUTO: 9.58 K/UL (ref 3.9–12.7)

## 2022-02-18 PROCEDURE — 93010 EKG 12-LEAD: ICD-10-PCS | Mod: ,,, | Performed by: INTERNAL MEDICINE

## 2022-02-18 PROCEDURE — 25000003 PHARM REV CODE 250: Performed by: INTERNAL MEDICINE

## 2022-02-18 PROCEDURE — 93010 ELECTROCARDIOGRAM REPORT: CPT | Mod: ,,, | Performed by: INTERNAL MEDICINE

## 2022-02-18 PROCEDURE — 93005 ELECTROCARDIOGRAM TRACING: CPT | Performed by: INTERNAL MEDICINE

## 2022-02-18 PROCEDURE — 36415 COLL VENOUS BLD VENIPUNCTURE: CPT | Performed by: NURSE PRACTITIONER

## 2022-02-18 PROCEDURE — 97530 THERAPEUTIC ACTIVITIES: CPT

## 2022-02-18 PROCEDURE — 63600175 PHARM REV CODE 636 W HCPCS: Performed by: INTERNAL MEDICINE

## 2022-02-18 PROCEDURE — 83735 ASSAY OF MAGNESIUM: CPT | Performed by: NURSE PRACTITIONER

## 2022-02-18 PROCEDURE — 85025 COMPLETE CBC W/AUTO DIFF WBC: CPT | Performed by: NURSE PRACTITIONER

## 2022-02-18 PROCEDURE — 80053 COMPREHEN METABOLIC PANEL: CPT | Performed by: NURSE PRACTITIONER

## 2022-02-18 PROCEDURE — 82378 CARCINOEMBRYONIC ANTIGEN: CPT | Performed by: NURSE PRACTITIONER

## 2022-02-18 PROCEDURE — 25000003 PHARM REV CODE 250: Performed by: NURSE PRACTITIONER

## 2022-02-18 PROCEDURE — 84100 ASSAY OF PHOSPHORUS: CPT | Performed by: NURSE PRACTITIONER

## 2022-02-18 PROCEDURE — 99223 1ST HOSP IP/OBS HIGH 75: CPT | Mod: ,,, | Performed by: INTERNAL MEDICINE

## 2022-02-18 PROCEDURE — 99223 PR INITIAL HOSPITAL CARE,LEVL III: ICD-10-PCS | Mod: ,,, | Performed by: INTERNAL MEDICINE

## 2022-02-18 RX ORDER — DABIGATRAN ETEXILATE MESYLATE 75 MG/1
75 CAPSULE ORAL 2 TIMES DAILY
Qty: 60 CAPSULE | Refills: 2 | Status: SHIPPED | OUTPATIENT
Start: 2022-02-18 | End: 2022-05-02 | Stop reason: SDUPTHER

## 2022-02-18 RX ORDER — LACTULOSE 10 G/15ML
20 SOLUTION ORAL; RECTAL 3 TIMES DAILY
Qty: 2700 ML | Refills: 0 | Status: SHIPPED | OUTPATIENT
Start: 2022-02-18 | End: 2022-03-20

## 2022-02-18 RX ORDER — LEVOFLOXACIN 250 MG/1
250 TABLET ORAL DAILY
Qty: 7 TABLET | Refills: 0 | Status: SHIPPED | OUTPATIENT
Start: 2022-02-18 | End: 2022-02-25

## 2022-02-18 RX ORDER — ESOMEPRAZOLE MAGNESIUM 40 MG/1
40 CAPSULE, DELAYED RELEASE ORAL DAILY
Qty: 30 CAPSULE | Refills: 2 | Status: SHIPPED | OUTPATIENT
Start: 2022-02-18 | End: 2022-05-19

## 2022-02-18 RX ADMIN — Medication: at 09:02

## 2022-02-18 RX ADMIN — POLYETHYLENE GLYCOL 3350 17 G: 17 POWDER, FOR SOLUTION ORAL at 12:02

## 2022-02-18 RX ADMIN — PIPERACILLIN SODIUM AND TAZOBACTAM SODIUM 3.38 G: 3; .375 INJECTION, POWDER, LYOPHILIZED, FOR SOLUTION INTRAVENOUS at 12:02

## 2022-02-18 RX ADMIN — ASPIRIN 81 MG: 81 TABLET, DELAYED RELEASE ORAL at 08:02

## 2022-02-18 RX ADMIN — DABIGATRAN ETEXILATE MESYLATE 75 MG: 75 CAPSULE ORAL at 08:02

## 2022-02-18 RX ADMIN — PIPERACILLIN SODIUM AND TAZOBACTAM SODIUM 3.38 G: 3; .375 INJECTION, POWDER, LYOPHILIZED, FOR SOLUTION INTRAVENOUS at 04:02

## 2022-02-18 NOTE — CONSULTS
Consult Note  Infectious Disease    Reason for Consult:  ACINETOBACTER BACTEREMIA     HPI: Azlu Clifford is a 81 y.o. female admitted through the ED on 2/13 with loss of consciousness, hypoxemia and was found to have pulmonary emboli. She was also found to have a large fecal impaction that was subsequently relieved. She was changed from her usual oral anticoagulant to another and seen by hematology. Blood cultures from admit did have Acinetobacter, pan sensitive. Urine culture was negative though urinalysis was abnormal with microscopic hematuria and WBC 30, epi 15, hyaline casts. . She has been afebrile with normal WBC except on day 1. No indwelling foreign bodies.   Last colonoscopy? She tells me it was 8 years ago at [facility] (closed now, cannot access records). History may be unreliable. She tells me also bhavna bruce has lost 30-40#. I care everywhere I can see a documented weight of 149 on 10/27/20. Current weight is 136.     Review of patient's allergies indicates:  No Known Allergies  Past Medical History:   Diagnosis Date    Arthritis     DM2 (diabetes mellitus, type 2)     Glaucoma capsular     HLD (hyperlipidemia)     Hypertension     Kidney stones     Skin lesion of breast      Past Surgical History:   Procedure Laterality Date    BREAST SURGERY      removed lump to right breast     EYE SURGERY      URETERAL STENT PLACEMENT      and removed   cataract extraction    Social History     Socioeconomic History    Marital status:    Tobacco Use    Smoking status: Former Smoker    Smokeless tobacco: Never Used   Substance and Sexual Activity    Alcohol use: No    Drug use: No     Family History   Problem Relation Age of Onset    Coronary artery disease Mother     Diabetes Mother          Review of Systems: reliability of history isin question  No chills, fever, sweats, but tells me she has had 30-40# weight loss  No change in vision,   No sinus congestion,   No pain in mouth or throat.    No chest  "pain,   No  , shortness of breath,    No nausea, vomiting, diarrhea, but frequently has constipation and she takes "those little pills from the dollar store" every 2 weeks, but cannot tell me what they are. ,Denies blood in stool, and does have some abdominal soreness on exam.         No swelling of joints, but has chronic  Pain in legs, which may actually be acute pain from DVT  No unusual headaches,    Diabetes, last A1c is 10 2020 at 6%  No bleeding,   unusual bruising  No new rashes,            EXAM & DIAGNOSTICS REVIEWED:   Vitals:     Temp:  [97.5 °F (36.4 °C)-98.6 °F (37 °C)]   Temp: 97.5 °F (36.4 °C) (02/18/22 1148)  Pulse: 106 (02/18/22 1148)  Resp: 18 (02/18/22 1148)  BP: (!) 141/101 (02/18/22 1148)  SpO2: 96 % (02/18/22 1148)    Intake/Output Summary (Last 24 hours) at 2/18/2022 1151  Last data filed at 2/17/2022 1816  Gross per 24 hour   Intake 240 ml   Output --   Net 240 ml       General:  In NAD. Alert and attentive, cooperative, comfortable.  Eyes:  Anicteric, PERRL, EOMI  ENT:  No ulcers, exudates, thrush, nares patent, edentulous  Neck:  supple, no masses or adenopathy appreciated  Lungs: Clear, no consolidation, rales, wheezes, rub  Heart:  RRR, no gallop/murmur/rub noted  Abd:  Soft,  ND, normal BS, no masses or organomegaly appreciated. She does indicate some soreness with abd palpation. No masses  :  Voids/  no flank tenderness  Musc:  Joints without effusion, swelling, erythema, synovitis,    Skin:  No rashes  But facial skin is very dry  Neuro:             Alert, attentive, speech fluent, face symmetric, moves all extremities, no focal weakness.    Psych:  Calm, cooperative     Extrem: Left leg enlargement d/w recent DVT. No erythema, phlebitis, cellulitis, warm and well perfused  VAD:  peripheral     Isolation:  none  Wound:       Lines/Tubes/Drains:    General Labs reviewed:  Recent Labs   Lab 02/16/22  0521 02/17/22  0637 02/18/22  0552   WBC 9.93 9.67 9.58   HGB 12.3 11.8* 12.0   HCT " 39.0 38.4 39.5    229 262       Recent Labs   Lab 02/16/22  0521 02/16/22  0521 02/17/22  0637 02/17/22  1657 02/18/22  0552     --  137  --  137   K 3.5   < > 3.6 4.5 4.0     --  102  --  103   CO2 22*  --  22*  --  24   BUN 33*  --  29*  --  24*   CREATININE 1.6*  --  1.5*  --  1.5*   CALCIUM 9.4  --  9.6  --  9.5   PROT 6.6  --  6.4  --  6.7   BILITOT 1.0  --  0.8  --  0.7   ALKPHOS 98  --  101  --  101   ALT 13  --  11  --  10   AST 18  --  13  --  12    < > = values in this interval not displayed.     No results for input(s): CRP in the last 168 hours.      Micro:  Microbiology Results (last 7 days)       Procedure Component Value Units Date/Time    Blood culture #1 **CANNOT BE ORDERED STAT** [[id number]] Collected: 02/13/22 1621    Order Status: Completed Specimen: Blood from Peripheral, Forearm, Right Updated: 02/17/22 1832     Blood Culture, Routine No Growth to date      No Growth to date      No Growth to date      No Growth to date      No Growth to date    Urine culture [[id number]] Collected: 02/14/22 0935    Order Status: Completed Specimen: Urine Updated: 02/17/22 0913     Urine Culture, Routine No growth    Narrative:      Specimen Source->Urine    Blood culture #2 **CANNOT BE ORDERED STAT** [[id number]]  (Abnormal)  (Susceptibility) Collected: 02/13/22 1702    Order Status: Completed Specimen: Blood from Peripheral, Forearm, Right Updated: 02/17/22 0901     Blood Culture, Routine Gram stain aer bottle: Gram positive cocci      Results called to and read back by:RADHA Bautista;        02/14/2022  10:02 RIP      Previous result of  Gram positive cocci is incorrect. The correct result       is  Gram negative coccobacilli.      Results reviewed and amended. Called to and read back by: RADHA Ferrer; RIP      ACINETOBACTER BAUMANNII/HAEMOLYTICUS            Imaging Reviewed:   CXR   CT CHEST ABD PELVIS    Cardiology:    IMPRESSION & PLAN   1. Likely  transient bacteremia from severe constipation   Second possibility is from UTI but urine culture was negative    2. Fecal impaction, relieved. Suspect chronic constipation  3. New DVT/PE despite Eliquis, now on Pradaxa    Incomplete database    Recommendations:  Ok to switch to po levaquin for a week  Recommend regular bowel program such as stool softener or miralax  I have added CEA to blood in lab as we do not have records of last (if any) colonoscopy    Medical Decision Making during this encounter was  [_] Low Complexity  [_] Moderate Complexity  [ x ] High Complexity    D/w Dr. Tamayo

## 2022-02-18 NOTE — PT/OT/SLP PROGRESS
"Occupational Therapy   Treatment    Name: Azul lCifford  MRN: [id number]  Admitting Diagnosis:  Acute bilateral deep vein thrombosis (DVT) of femoral veins       Recommendations:     Discharge Recommendations: home health OT  Discharge Equipment Recommendations:   (TBD)  Barriers to discharge:       Assessment:     Azul Clifford is a 81 y.o. female with a medical diagnosis of Acute bilateral deep vein thrombosis (DVT) of femoral veins.  She presents with pain in R leg, agreeable to OT. Performance deficits affecting function are weakness,impaired endurance,impaired self care skills,impaired functional mobilty,gait instability,impaired balance,pain,decreased lower extremity function,impaired cardiopulmonary response to activity.     Rehab Prognosis:  Fair; patient would benefit from acute skilled OT services to address these deficits and reach maximum level of function.       Plan:     Patient to be seen 5 x/week to address the above listed problems via self-care/home management,therapeutic activities,therapeutic exercises  · Plan of Care Expires: 03/16/22  · Plan of Care Reviewed with: patient    Subjective     Pain/Comfort:  · Pain Rating 1: other (see comments) ("a lot")  · Location - Side 1: Right  · Location 1: leg    Objective:     Communicated with: nurse prior to session.  Patient found HOB elevated with bed alarm,telemetry,peripheral IV upon OT entry to room.    General Precautions: Standard, fall   Orthopedic Precautions:N/A   Braces: N/A  Respiratory Status: Room air     Occupational Performance:     Bed Mobility:    · Patient completed Rolling/Turning to Left with  stand by assistance  · Patient completed Rolling/Turning to Right with stand by assistance  · Patient completed Scooting/Bridging with stand by assistance  · Patient completed Supine to Sit with stand by assistance  · Patient completed Sit to Supine with stand by assistance     Functional Mobility/Transfers:  · Patient completed Sit <> Stand " Transfer with stand by assistance  with  rolling walker     Activities of Daily Living:  · Grooming: set up assist washing face while sitting EOB      [facility] 6 Click ADL:      Treatment & Education:  Role of OT, call bell use, safety awareness, importance of OOB activity.  Pt completed 5 sit<>stands from bedside, washed face while sitting EOB. Pt soiled, nursing notified to clean up pt.    Patient left HOB elevated with all lines intact, call button in reach, bed alarm off and nurse presentEducation:      GOALS:   Multidisciplinary Problems     Occupational Therapy Goals        Problem: Occupational Therapy Goal    Goal Priority Disciplines Outcome Interventions   Occupational Therapy Goal     OT, PT/OT Ongoing, Progressing    Description: Goals to be met by: discharge    Patient will increase functional independence with ADLs by performing:    UE Dressing with Supervision.  LE Dressing with Supervision.  Grooming while standing at sink with Supervision.  Toileting from toilet with Supervision for hygiene and clothing management.   Toilet transfer to toilet with Supervision.                     Time Tracking:     OT Date of Treatment: 02/18/22  OT Start Time: 1107  OT Stop Time: 1119  OT Total Time (min): 12 min    Billable Minutes:Therapeutic Activity 12    OT/AMNA: OT          2/18/2022

## 2022-02-18 NOTE — PLAN OF CARE
Chart and discharge orders reviewed.  Patient discharged home with [facility] home health. Belle confirmed receipt of orders, start of care set for 2/19/22.  notified patients daughter Jeaenth of discharge and left voice mail for Apple at [facility] to inform of discharge. No further case management needs noted.       02/18/22 1431   Final Note   Assessment Type Final Discharge Note   Anticipated Discharge Disposition Home-Health   What phone number can be called within the next 1-3 days to see how you are doing after discharge? [phone]   Post-Acute Status   Post-Acute Authorization Home Health   Home Health Status Set-up Complete/Auth obtained   Patient choice form signed by patient/caregiver List with quality metrics by geographic area provided

## 2022-02-18 NOTE — PLAN OF CARE
Important Message from Medicare was sign, explained and given to patient/caregiver on 02/18/2022 at 11:06am     addressed any questions or concerns.    Important Message from Medicare document will be scanned into patient's medical record

## 2022-02-18 NOTE — NURSING
EKG was okay and Roni said it was okay for patient to discharge. Patients daughter was given discharge paper pwork after instructions were reviewed. [facility] Room was checked and nothing was left behind.

## 2022-02-18 NOTE — DISCHARGE SUMMARY
[facility] Medicine  Discharge Summary      Patient Name: Azul Clifford  MRN: [id number]  Patient Class: IP- Inpatient  Admission Date: 2/13/2022  Hospital Length of Stay: 5 days  Discharge Date and Time:  02/18/2022 12:14 PM  Attending Physician: Case Tamayo MD   Discharging Provider: Case Tamayo MD  Primary Care Provider: Andrew Francisco Iv, MD      HPI:   Azul Clifford is a 81 y.o. Black or  female with known history of an, diabetes mellitus, DVT, PE/DVT (noncompliance with anticoagulation), hyperlipidemia who presented with a primary complaint of Loss of Consciousness (Found unresponsive, hypoxic to 60s in hallway)  History was obtained from the patient and collateral obtained from the family present at the bedside and ER physician Sign-out.      States that she did not feel well yesterday, having left leg pain, abdominal pain/constipation.  Also reports mild shortness of breath.  Denies fever, chills, chest pain, nausea, vomiting, diarrhea, urinary symptoms.  Slept a lot yesterday.  Has not been taking medication as prescribed, especially her blood thinner.     Earlier today, she walked to open the door when her daughter got to her house. She walked back and sat on a chair. She complained of being tired and shortly became unresponsive - stop talking and not answering questions. EMS was activated and she subsequently brought to the ED.      Per ED physician, EMS reported that was awake, alert, and oriented with stable vital signs.  She became shortness of breath, tachycardic, with SpO2 in the 60 in the ED hallway  CBC - WBC 15.78, hemoglobin 14.4, hematocrit 45.7, platelet 187  CMP - potassium 3.6, BUN 30, creatinine 2.0, estimated GFR 26, glucose 183  BNP -38  Troponin - 0.032  COVID 19 - Negative  Lactic acid elevated 4.3  EKG - sinus tachycardia, rate 113, left ventricular hypertrophy, no ST elevation  CXR -  IMPRESSION:  1. No acute pulmonary process.  2.  Prominence of the aortic knob suggestive of an aneurysm or dissection, new/worse when compared to the previous exam, consider dedicated contrast-enhanced CT follow-up.  NM lung scan - Intermediate-to high probability for acute PE.  CT Head without contrast - no acute intracranial process  CT cervical spine - no acute fracture or traumatic malalignment the cervical spine  CT head abdomen pelvis -  IMPRESSION:  1. Prominence of the left greater than right lower extremity deep venous vessels from the common iliac to the visualized proximal thigh with mild adjacent fat stranding suspicious for an occult deep venous thrombus and adjacent edema, consider correlation with lower extremity Doppler ultrasound.  2. Fusiform infrarenal abdominal aortic aneurysm measuring 3.4 cm in diameter, there is mild fat stranding along the distal abdominal aorta and iliacs, possibly from underlying deep venous thrombosis, noting that aortic pathology is also a consideration (aortitis, or less likely a leaking aneurysm).  3. 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  4. Mild cardiomegaly and scattered coronary arterial calcifications (3 vessel disease).  5. Large stool ball in the rectal vault, consider correlation for fecal impaction, and/or constipation, with a moderate volume of stool and gas in the remainder the colon.  6. Small nonobstructing left-sided renal stone measuring 2 mm.  7. Sludge in the gallbladder without findings of acute cholecystitis.  8. Numerous additional, and incidental findings as noted above.  US venous LEs -  Extensive DVT bilaterally, occlusive throughout the left lower extremity and occlusive in the right femoral vein.      * No surgery found *      Hospital Course:   Patient reports 2 day history of bilateral leg pain and weakness, possibly shortness of breath but poor historian.  States she lives alone.  Reportedly had syncopal episode with collapse, in the ED was  noted to be hypoxic, noncompliant with anticoagulation.  Saturations improved, vital stable, lactic acid 4.3 but improved to 1.9.  Lower extremity with extensive bilateral DVT, CKD stage 3, V/Q scan with intermediate to high probability acute PE, started on therapeutic dose Lovenox.  On 02/14, hemodynamically stable, echocardiogram pending, 1/2 blood culture preliminary with GPC (on vancomycin), vascular and Hematology consult pending.    02/15/2022  Ms Clifford notes some leg pain. She is a poor historian    2/16/2022  Ms Clifford still feels weak with occasional leg cramps. She denies fever, chills SOB or chest pain. Per her family she will live at [facility] with a family member staying with her     1/17/2022  Doctor I want to get out of here. When can I go and I do not like sitting in this chair    1/18/2022  Ms Clifford has no complaints save that she wants to go home I have updated her daughter. Please follow the Lactulose instructions  ROS see above otherwise negative X 9   PE in no distress HEENT OLIVA EOM intact moist mucus membranes Neck supple no use of accessory muscles Lungs no crackles wheezes or rhonchi Heart S 1 S 2 RRR no murmur Abdomen BS+ nontender no distention Ext without CC or E pulses 1-2+ Skin no acute finding Neuro no acute sensory or motor deficit       Goals of Care Treatment Preferences:  Code Status: Full Code      Consults:   Consults (From admission, onward)        Status Ordering Provider     Inpatient consult to Infectious Diseases  Once        Provider:  Leanne Gonzales MD    Acknowledged TIGIST DYER     IP consult to case management  Once        Provider:  (Not yet assigned)    Completed MORRIS ATKINS     Inpatient consult to Hematology Oncology  Once        Provider:  Storm Williamson MD    Acknowledged NORMA MALDONADO     Inpatient consult to Vascular Surgery  Once        Provider:  Ali Khoobehi, MD    Completed MORRIS ATKINS     Inpatient consult to  Hospitalist  Once        Provider:  DANNY Pleitez REBECCA L          No new Assessment & Plan notes have been filed under this hospital service since the last note was generated.  Service: Hospital Medicine    Final Active Diagnoses:    Diagnosis Date Noted POA    PRINCIPAL PROBLEM:  Acute bilateral deep vein thrombosis (DVT) of femoral veins [I82.413] 02/14/2022 Yes     Chronic    Positive blood culture [R78.81] 02/14/2022 Yes    CKD (chronic kidney disease), stage III [N18.30] 02/14/2022 Yes     Chronic    Non compliance w medication regimen [Z91.14] 02/14/2022 Not Applicable     Chronic    Constipation with fecal impaction  [K59.00] 02/14/2022 Yes    Type 2 diabetes mellitus, without long-term current use of insulin [E11.9] 02/14/2022 Yes     Chronic    Syncope and collapse [R55] 02/14/2022 Yes    Abdominal aortic aneurysm (AAA) without rupture [I71.4] 02/14/2022 Yes     Chronic    Thrombocytopenia [D69.6] 02/14/2022 No    Primary hypertension [I10] 02/14/2022 Yes     Chronic    Bilateral pulmonary embolism [I26.99] 04/14/2018 Yes      Problems Resolved During this Admission:    Diagnosis Date Noted Date Resolved POA    Lactic acid increased [E87.2] 02/14/2022 02/14/2022 Yes    Leucocytosis [D72.829] 02/14/2022 02/14/2022 Yes       Discharged Condition: good    Disposition: Home or Self Care    Follow Up:   Follow-up Information     Andrew Francisco Iv, MD In 1 week.    Specialty: Family Medicine  Contact information:  [address]   [place] [address]  [phone]             Storm Velazquez MD In 2 weeks.    Specialties: Hematology, Oncology, Hematology and Oncology  Contact information:  [address]  [facility] [address]  [phone]                       Patient Instructions:      Ambulatory referral/consult to Home Health   Standing Status: Future   Referral Priority: Routine Referral Type: Home Health Care   Referral Reason: Specialty  Services Required   Requested Specialty: Home Health Services   Number of Visits Requested: 1     Diet diabetic     Activity as tolerated       Significant Diagnostic Studies: Labs:   BMP:   Recent Labs   Lab 02/17/22  0637 02/17/22 1657 02/18/22  0552   GLU 77  --  134*     --  137   K 3.6 4.5 4.0     --  103   CO2 22*  --  24   BUN 29*  --  24*   CREATININE 1.5*  --  1.5*   CALCIUM 9.6  --  9.5   MG 2.3  --  2.3   , CMP   Recent Labs   Lab 02/17/22  0637 02/17/22  1657 02/18/22  0552     --  137   K 3.6 4.5 4.0     --  103   CO2 22*  --  24   GLU 77  --  134*   BUN 29*  --  24*   CREATININE 1.5*  --  1.5*   CALCIUM 9.6  --  9.5   PROT 6.4  --  6.7   ALBUMIN 2.8*  --  2.7*   BILITOT 0.8  --  0.7   ALKPHOS 101  --  101   AST 13  --  12   ALT 11  --  10   ANIONGAP 13  --  10   ESTGFRAFRICA 36.9*  --  36.9*   EGFRNONAA 32.0*  --  32.0*   , CBC   Recent Labs   Lab 02/17/22  0637 02/17/22  0637 02/18/22  0552   WBC 9.67  --  9.58   HGB 11.8*  --  12.0   HCT 38.4   < > 39.5     --  262    < > = values in this interval not displayed.   , INR   Lab Results   Component Value Date    INR 1.2 02/13/2022    INR 1.1 04/14/2018    INR 1.1 04/13/2018   , Troponin   Recent Labs   Lab 02/13/22  1621   TROPONINI 0.032    and All labs within the past 24 hours have been reviewed  Microbiology:   Blood Culture   Lab Results   Component Value Date    LABBLOO Gram stain aer bottle: Gram positive cocci 02/13/2022    LABBLOO  02/13/2022     Results called to and read back by:Corrie Currie RN-11[facility];      LABBLOO 02/14/2022  10:02 CJD 02/13/2022    LABBLOO  02/13/2022     Previous result of  Gram positive cocci is incorrect. The correct result     HILARIO is  Gram negative coccobacilli. 02/13/2022    LABMARIAN  02/13/2022     Results reviewed and amended. Called to and read back by: HILARIO Ferrer [id number]; RIP 02/13/2022    HILARIO ACINETOBACTER BAUMANNII/HAEMOLYTICUS (A) 02/13/2022     "and Urine Culture    Lab Results   Component Value Date    LABURIN No growth 02/14/2022       Pending Diagnostic Studies:     None         Medications:  Reconciled Home Medications:      Medication List      START taking these medications    dabigatran etexilate 75 mg Cap  Commonly known as: PRADAXA  Take 1 capsule (75 mg total) by mouth 2 (two) times a day. "Do NOT break, chew, or open capsules."     lactulose 20 gram/30 mL Soln  Commonly known as: CHRONULAC  Take 30 mLs (20 g total) by mouth 3 (three) times daily. Until she has regular bowel movements then once daily     levoFLOXacin 500 MG tablet  Commonly known as: LEVAQUIN  Take 0.5 tablets (250 mg total) by mouth once daily. for 7 days        CHANGE how you take these medications    esomeprazole 40 MG capsule  Commonly known as: NEXIUM  Take 1 capsule (40 mg total) by mouth once daily.  What changed:   · when to take this  · reasons to take this        CONTINUE taking these medications    * ALCOHOL SWABS TOP     * ALCOHOL PADS TOP  USE TWICE DAILY PRIOR TO FINGER STICK     amLODIPine 10 MG tablet  Commonly known as: NORVASC  Take 10 mg by mouth once daily.     apixaban 5 mg Tab  Commonly known as: ELIQUIS  Take 1 tablet (5 mg total) by mouth 2 (two) times daily.     aspirin 81 MG EC tablet  Commonly known as: ECOTRIN  Take 81 mg by mouth once daily.     cloNIDine 0.1 MG tablet  Commonly known as: CATAPRES  Take 0.1 mg by mouth 2 (two) times daily.     ergocalciferol 50,000 unit Cap  Commonly known as: ERGOCALCIFEROL  Take 1,250 mcg by mouth once daily.     simvastatin 40 MG tablet  Commonly known as: ZOCOR  Take 40 mg by mouth once daily.     traMADoL 50 mg tablet  Commonly known as: ULTRAM  Take 50 mg by mouth 3 (three) times daily as needed.     TRAVATAN Z 0.004 % ophthalmic solution  Generic drug: travoprost  1 drop 2 (two) times daily.         * This list has 2 medication(s) that are the same as other medications prescribed for you. Read the directions " carefully, and ask your doctor or other care provider to review them with you.            STOP taking these medications    hydroCHLOROthiazide 12.5 mg capsule  Commonly known as: MICROZIDE     metFORMIN 500 MG tablet  Commonly known as: GLUCOPHAGE     olmesartan-amLODIPin-hcthiazid 40-10-12.5 mg Tab     olmesartan-hydrochlorothiazide 20-12.5 mg per tablet  Commonly known as: BENICAR HCT     pantoprazole 40 MG tablet  Commonly known as: PROTONIX            Indwelling Lines/Drains at time of discharge:   Lines/Drains/Airways     None                 Time spent on the discharge of patient: 40 minutes         Case Tamayo MD  Department of Hospital Medicine  [facility]

## 2022-02-18 NOTE — PT/OT/SLP PROGRESS
Physical Therapy      Patient Name:  Azul Clifford   MRN:  [id number]    Patient not seen today secondary to Patient fatigue. Will follow-up 2/19/22.

## 2022-02-18 NOTE — NURSING
Patient had a couple runs of vtach. Patient experienced no symptoms. Vitals were taken. Informed Dr. Tamayo, no orders were given.

## 2022-02-18 NOTE — PLAN OF CARE
Problem: Occupational Therapy Goal  Goal: Occupational Therapy Goal  Description: Goals to be met by: discharge    Patient will increase functional independence with ADLs by performing:    UE Dressing with Supervision.  LE Dressing with Supervision.  Grooming while standing at sink with Supervision.  Toileting from toilet with Supervision for hygiene and clothing management.   Toilet transfer to toilet with Supervision.    Outcome: Met

## 2022-02-18 NOTE — PT/OT/SLP DISCHARGE
Occupational Therapy Discharge Summary    Azul Clifford  MRN: [id number]   Principal Problem: Acute bilateral deep vein thrombosis (DVT) of femoral veins      Patient Discharged from acute Occupational Therapy on 2/18/2022.  Please refer to prior OT note dated 2/18/2022 for functional status.    Assessment:      Patient has met all goals and is not appropriate for therapy.    Objective:     GOALS:   Multidisciplinary Problems     Occupational Therapy Goals     Not on file          Multidisciplinary Problems (Resolved)        Problem: Occupational Therapy Goal    Goal Priority Disciplines Outcome Interventions   Occupational Therapy Goal   (Resolved)     OT, PT/OT Met    Description: Goals to be met by: discharge    Patient will increase functional independence with ADLs by performing:    UE Dressing with Supervision.  LE Dressing with Supervision.  Grooming while standing at sink with Supervision.  Toileting from toilet with Supervision for hygiene and clothing management.   Toilet transfer to toilet with Supervision.                     Reasons for Discontinuation of Therapy Services  Satisfactory goal achievement.      Plan:     Patient Discharged to: Home with Home Health Service    2/18/2022

## 2022-02-22 ENCOUNTER — EXTERNAL HOME HEALTH (OUTPATIENT)
Dept: HOME HEALTH SERVICES | Facility: HOSPITAL | Age: 82
End: 2022-02-22
Payer: MEDICARE

## 2022-03-02 ENCOUNTER — TELEPHONE (OUTPATIENT)
Dept: HEMATOLOGY/ONCOLOGY | Facility: CLINIC | Age: 82
End: 2022-03-02
Payer: MEDICARE

## 2022-03-02 ENCOUNTER — DOCUMENT SCAN (OUTPATIENT)
Dept: HOME HEALTH SERVICES | Facility: HOSPITAL | Age: 82
End: 2022-03-02
Payer: MEDICARE

## 2022-03-02 NOTE — TELEPHONE ENCOUNTER
Home health  Nurse called stating she was currently with pt and pt has developed a knot under her right ear.  Offered for pt to come in tomorrow to see CHARLES Monaco but pt has another appt at that time.  Pt is coming in to see CHARLES Monaco on Friday at 11:00. Verbalized understanding.

## 2022-03-04 ENCOUNTER — OFFICE VISIT (OUTPATIENT)
Dept: HEMATOLOGY/ONCOLOGY | Facility: CLINIC | Age: 82
End: 2022-03-04
Payer: MEDICARE

## 2022-03-04 VITALS
BODY MASS INDEX: 24.09 KG/M2 | HEIGHT: 63 IN | SYSTOLIC BLOOD PRESSURE: 106 MMHG | RESPIRATION RATE: 18 BRPM | TEMPERATURE: 98 F | DIASTOLIC BLOOD PRESSURE: 72 MMHG | HEART RATE: 96 BPM

## 2022-03-04 DIAGNOSIS — Z76.89 ENCOUNTER TO ESTABLISH CARE WITH NEW DOCTOR: ICD-10-CM

## 2022-03-04 DIAGNOSIS — I82.413 ACUTE BILATERAL DEEP VEIN THROMBOSIS (DVT) OF FEMORAL VEINS: Primary | Chronic | ICD-10-CM

## 2022-03-04 DIAGNOSIS — I26.99 BILATERAL PULMONARY EMBOLISM: ICD-10-CM

## 2022-03-04 DIAGNOSIS — L02.91 ABSCESS: ICD-10-CM

## 2022-03-04 PROCEDURE — 99215 OFFICE O/P EST HI 40 MIN: CPT | Mod: S$GLB,,, | Performed by: NURSE PRACTITIONER

## 2022-03-04 PROCEDURE — 1111F DSCHRG MED/CURRENT MED MERGE: CPT | Mod: S$GLB,,, | Performed by: NURSE PRACTITIONER

## 2022-03-04 PROCEDURE — 1111F PR DISCHARGE MEDS RECONCILED W/ CURRENT OUTPATIENT MED LIST: ICD-10-PCS | Mod: S$GLB,,, | Performed by: NURSE PRACTITIONER

## 2022-03-04 PROCEDURE — 99215 PR OFFICE/OUTPT VISIT, EST, LEVL V, 40-54 MIN: ICD-10-PCS | Mod: S$GLB,,, | Performed by: NURSE PRACTITIONER

## 2022-03-04 RX ORDER — DOXYCYCLINE HYCLATE 100 MG
100 TABLET ORAL 2 TIMES DAILY
Qty: 20 TABLET | Refills: 0 | Status: SHIPPED | OUTPATIENT
Start: 2022-03-04 | End: 2023-12-01 | Stop reason: ALTCHOICE

## 2022-03-04 NOTE — PROGRESS NOTES
[facility] HEM/ONC PROGRESS NOTE      Subjective:       Patient ID:   Azul Clifford  81 y.o. female.  1940    Chief Complaint:  DVT/PE New knot under right ear    HPI    Patient returns today for an add on hospital follow up visit. She developed a painful red swollen knot under her right ear a few days ago. She denies any fever, ear pain or sinus congestion.    Patient has history of blood clot 6 years ago. She also has a sister with a history of a blood clot.SHe was previously seen by Dr. Reece but he is no longer practicing. Patient is unsure if she has had a hypercoagulation workup. Discussed the possibilty of a hereditary hypercoag disorder with the patient and her daughter.    80 yo female with previous history of DVT PE approx 6 years ago. She was on Eliquis but is not sure of her last dose and her daughter is not sure if she has been compliant with her medications.  Patient presented ER on 2/13/2022 via EMS due to being found unresponsive and oxygen sats in the 60's.She was found to have Bilateral PE And DVT. She was admitted to [facility] on 2/13/2022 and discharged 2/18/2022 on Pradaxa.    Review of Systems   Constitutional: Positive for malaise/fatigue. Negative for weight loss.   HENT: Negative for ear pain and sinus pain.    Respiratory: Negative for cough.    Cardiovascular: Negative for chest pain and palpitations.   Gastrointestinal: Negative for abdominal pain.   Genitourinary: Negative for dysuria.   Musculoskeletal: Negative for neck pain.        BLE pain   Skin: Negative for rash.        Knot under right ear   Neurological: Positive for weakness.   Psychiatric/Behavioral: Negative for depression.     Allergies:  Review of patient's allergies indicates:  No Known Allergies    Medications:    Current Outpatient Medications:     alcohol antiseptic pads (ALCOHOL PADS TOP), USE TWICE DAILY PRIOR TO FINGER STICK, Disp: , Rfl:     alcohol antiseptic pads (ALCOHOL SWABS TOP), , Disp: , Rfl:      "amLODIPine (NORVASC) 10 MG tablet, Take 10 mg by mouth once daily., Disp: , Rfl:     apixaban 5 mg Tab, Take 1 tablet (5 mg total) by mouth 2 (two) times daily., Disp: 60 tablet, Rfl: 5    aspirin (ECOTRIN) 81 MG EC tablet, Take 81 mg by mouth once daily., Disp: , Rfl:     cloNIDine (CATAPRES) 0.1 MG tablet, Take 0.1 mg by mouth 2 (two) times daily., Disp: , Rfl:     dabigatran etexilate (PRADAXA) 75 mg Cap, Take 1 capsule (75 mg total) by mouth 2 (two) times a day., Disp: 60 capsule, Rfl: 2    doxycycline (VIBRA-TABS) 100 MG tablet, Take 1 tablet (100 mg total) by mouth 2 (two) times daily., Disp: 20 tablet, Rfl: 0    ergocalciferol (ERGOCALCIFEROL) 50,000 unit Cap, Take 1,250 mcg by mouth once daily., Disp: , Rfl:     esomeprazole (NEXIUM) 40 MG capsule, Take 1 capsule (40 mg total) by mouth once daily., Disp: 30 capsule, Rfl: 2    lactulose (CHRONULAC) 10 gram/15 mL solution, Take 30 mLs (20 g total) by mouth 3 (three) times daily.. Once you have regular bowel movements, decrease to 30 mLs by mouth once daily., Disp: 2700 mL, Rfl: 0    simvastatin (ZOCOR) 40 MG tablet, Take 40 mg by mouth once daily., Disp: , Rfl:     traMADoL (ULTRAM) 50 mg tablet, Take 50 mg by mouth 3 (three) times daily as needed., Disp: , Rfl:     TRAVATAN Z 0.004 % Drop, 1 drop 2 (two) times daily. , Disp: , Rfl:       Objective:     Vitals:  Blood pressure 106/72, pulse 96, temperature 98 °F (36.7 °C), resp. rate 18, height 5' 3" (1.6 m).    Physical Exam  Constitutional:       Appearance: Normal appearance.   HENT:      Head: Normocephalic.      Jaw: Tenderness and swelling present.        Right Ear: External ear normal.      Left Ear: External ear normal.      Nose: Nose normal.      Mouth/Throat:      Mouth: Mucous membranes are moist.   Eyes:      Pupils: Pupils are equal, round, and reactive to light.   Cardiovascular:      Rate and Rhythm: Normal rate and regular rhythm.   Pulmonary:      Effort: Pulmonary effort is " normal.      Breath sounds: Normal breath sounds.   Abdominal:      General: Abdomen is flat.   Musculoskeletal:      Cervical back: Normal range of motion.      Right lower leg: Edema present.      Left lower leg: No edema.   Skin:     General: Skin is warm and dry.   Neurological:      General: No focal deficit present.      Mental Status: She is alert.   Psychiatric:         Mood and Affect: Mood normal.         Behavior: Behavior normal.         Thought Content: Thought content normal.         Judgment: Judgment normal.        Labs:   Lab Results   Component Value Date    WBC 9.58 02/18/2022    HGB 12.0 02/18/2022    HCT 39.5 02/18/2022    MCV 84 02/18/2022     02/18/2022    CMP  Sodium   Date Value Ref Range Status   02/18/2022 137 136 - 145 mmol/L Final     Potassium   Date Value Ref Range Status   02/18/2022 4.0 3.5 - 5.1 mmol/L Final     Chloride   Date Value Ref Range Status   02/18/2022 103 95 - 110 mmol/L Final     CO2   Date Value Ref Range Status   02/18/2022 24 23 - 29 mmol/L Final     Glucose   Date Value Ref Range Status   02/18/2022 134 (H) 70 - 110 mg/dL Final     BUN   Date Value Ref Range Status   02/18/2022 24 (H) 8 - 23 mg/dL Final     Creatinine   Date Value Ref Range Status   02/18/2022 1.5 (H) 0.5 - 1.4 mg/dL Final     Calcium   Date Value Ref Range Status   02/18/2022 9.5 8.7 - 10.5 mg/dL Final     Total Protein   Date Value Ref Range Status   02/18/2022 6.7 6.0 - 8.4 g/dL Final     Albumin   Date Value Ref Range Status   02/18/2022 2.7 (L) 3.5 - 5.2 g/dL Final     Total Bilirubin   Date Value Ref Range Status   02/18/2022 0.7 0.1 - 1.0 mg/dL Final     Comment:     For infants and newborns, interpretation of results should be based  on gestational age, weight and in agreement with clinical  observations.    Premature Infant recommended reference ranges:  Up to 24 hours.............<8.0 mg/dL  Up to 48 hours............<12.0 mg/dL  3-5 days..................<15.0 mg/dL  6-29  days.................<15.0 mg/dL       Alkaline Phosphatase   Date Value Ref Range Status   02/18/2022 101 55 - 135 U/L Final     AST   Date Value Ref Range Status   02/18/2022 12 10 - 40 U/L Final     ALT   Date Value Ref Range Status   02/18/2022 10 10 - 44 U/L Final     Anion Gap   Date Value Ref Range Status   02/18/2022 10 8 - 16 mmol/L Final     eGFR if    Date Value Ref Range Status   02/18/2022 36.9 (A) >60 mL/min/1.73 m^2 Final     eGFR if non    Date Value Ref Range Status   02/18/2022 32.0 (A) >60 mL/min/1.73 m^2 Final     Comment:     Calculation used to obtain the estimated glomerular filtration  rate (eGFR) is the CKD-EPI equation.        I have reviewed all available lab results and radiology reports.    Radiology/Diagnostic Studies:  CT CAP 2/13/2022:  IMPRESSION:  1. Prominence of the left greater than right lower extremity deep venous vessels from the common iliac to the visualized proximal thigh with mild adjacent fat stranding suspicious for an occult deep venous thrombus and adjacent edema, consider correlation with lower extremity Doppler ultrasound.  2. Fusiform infrarenal abdominal aortic aneurysm measuring 3.4 cm in diameter, there is mild fat stranding along the distal abdominal aorta and iliacs/, possibly from underlying deep venous thrombosis, noting that aortic pathology is also a consideration (aortitis, or less likely a leaking aneurys:m).  3. 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  4. Mild cardiomegaly and scattered coronary arterial calcifications (3 vessel disease).  5. Large stool ball in the rectal vault, consider correlation for fecal impaction, and/or constipation, with a moderate volume of stool and gas in the remainder the colon.  6. Small nonobstructing left-sided renal stone measuring 2 mm.  7. Sludge in the gallbladder without findings of acute cholecystitis.  8. Numerous additional, and  incidental findings as noted above.     Assessment/Plan:   (1) 81 y.o. female with diagnosis of Bilateral PE and DVT- Continue Pradaxa. Hypercoagulation workup    (2) Right Facial Abscess- Start Doxy and follow up with Gen Surg Monday for possible drainage.    (3) Est care with new PCP      1. Acute bilateral deep vein thrombosis (DVT) of femoral veins  Factor V (Leiden) Mutation Analysis    MTHFR Thermolabile Variant, DNA Analysis    Anticardiolipin Ab (JAIMIE), IgA/IgG/IgM, QN    Antithrombin III    Protein S Activity and Antigen, Total    Protein C Activity and Antigen    Prothrombin Z07300I Mutation    Phosphatidylserine Ab (IgA,IgG,IgM)    Methylenetetrahydrofol Genotyping (C677T/S6774B)    Lupus Anticoagulant Eval w/Reflex    B2 Glycoprotein I Ab, IgM    B2 Glycoprotein I Ab, IgG    B2 Glycoprotein I Ab, IgA    Factor V (Leiden) Mutation Analysis    Anticardiolipin Ab (JAIMIE), IgA/IgG/IgM, QN    Protein S Activity and Antigen, Total    Protein C Activity and Antigen    Phosphatidylserine Ab (IgA,IgG,IgM)    Methylenetetrahydrofol Genotyping (C677T/S0939G)    B2 Glycoprotein I Ab, IgM    B2 Glycoprotein I Ab, IgG    B2 Glycoprotein I Ab, IgA   2. Bilateral pulmonary embolism  Factor V (Leiden) Mutation Analysis    MTHFR Thermolabile Variant, DNA Analysis    Anticardiolipin Ab (JAIMIE), IgA/IgG/IgM, QN    Antithrombin III    Protein S Activity and Antigen, Total    Protein C Activity and Antigen    Prothrombin [id number] Mutation    Phosphatidylserine Ab (IgA,IgG,IgM)    Methylenetetrahydrofol Genotyping (C677T/U9589I)    Lupus Anticoagulant Eval w/Reflex    B2 Glycoprotein I Ab, IgM    B2 Glycoprotein I Ab, IgG    B2 Glycoprotein I Ab, IgA    Factor V (Leiden) Mutation Analysis    Anticardiolipin Ab (JAIMIE), IgA/IgG/IgM, QN    Protein S Activity and Antigen, Total    Protein C Activity and Antigen    Phosphatidylserine Ab (IgA,IgG,IgM)    Methylenetetrahydrofol Genotyping (C677T/C3443S)    B2 Glycoprotein I Ab, IgM    B2  Glycoprotein I Ab, IgG    B2 Glycoprotein I Ab, IgA   3. Abscess  doxycycline (VIBRA-TABS) 100 MG tablet    Ambulatory referral/consult to General Surgery   4. Encounter to establish care with new doctor  Ambulatory referral/consult to Family Practice       Discussion:     I have explained and the patient understands all of  the current recommendation(s). I have answered all of their questions to the best of my ability and to their complete satisfaction.   The patient is to continue with the current management plan.    Follow up in about 13 days (around 3/17/2022) for with Dr. Williamson as scheduled.    Electronically signed by: Jacinda Lofton, MSN, APRN, AGNP-C, OCN

## 2022-03-07 ENCOUNTER — OFFICE VISIT (OUTPATIENT)
Dept: SURGERY | Facility: CLINIC | Age: 82
End: 2022-03-07
Payer: MEDICARE

## 2022-03-07 DIAGNOSIS — L02.91 ABSCESS: Primary | ICD-10-CM

## 2022-03-07 PROCEDURE — 1126F PR PAIN SEVERITY QUANTIFIED, NO PAIN PRESENT: ICD-10-PCS | Mod: CPTII,S$GLB,, | Performed by: STUDENT IN AN ORGANIZED HEALTH CARE EDUCATION/TRAINING PROGRAM

## 2022-03-07 PROCEDURE — 3288F FALL RISK ASSESSMENT DOCD: CPT | Mod: CPTII,S$GLB,, | Performed by: STUDENT IN AN ORGANIZED HEALTH CARE EDUCATION/TRAINING PROGRAM

## 2022-03-07 PROCEDURE — 1111F DSCHRG MED/CURRENT MED MERGE: CPT | Mod: CPTII,S$GLB,, | Performed by: STUDENT IN AN ORGANIZED HEALTH CARE EDUCATION/TRAINING PROGRAM

## 2022-03-07 PROCEDURE — 1159F MED LIST DOCD IN RCRD: CPT | Mod: CPTII,S$GLB,, | Performed by: STUDENT IN AN ORGANIZED HEALTH CARE EDUCATION/TRAINING PROGRAM

## 2022-03-07 PROCEDURE — 1101F PR PT FALLS ASSESS DOC 0-1 FALLS W/OUT INJ PAST YR: ICD-10-PCS | Mod: CPTII,S$GLB,, | Performed by: STUDENT IN AN ORGANIZED HEALTH CARE EDUCATION/TRAINING PROGRAM

## 2022-03-07 PROCEDURE — 3288F PR FALLS RISK ASSESSMENT DOCUMENTED: ICD-10-PCS | Mod: CPTII,S$GLB,, | Performed by: STUDENT IN AN ORGANIZED HEALTH CARE EDUCATION/TRAINING PROGRAM

## 2022-03-07 PROCEDURE — 99999 PR PBB SHADOW E&M-EST. PATIENT-LVL II: CPT | Mod: PBBFAC,,, | Performed by: STUDENT IN AN ORGANIZED HEALTH CARE EDUCATION/TRAINING PROGRAM

## 2022-03-07 PROCEDURE — 99212 OFFICE O/P EST SF 10 MIN: CPT | Mod: PBBFAC,PN | Performed by: STUDENT IN AN ORGANIZED HEALTH CARE EDUCATION/TRAINING PROGRAM

## 2022-03-07 PROCEDURE — 99999 PR PBB SHADOW E&M-EST. PATIENT-LVL II: ICD-10-PCS | Mod: PBBFAC,,, | Performed by: STUDENT IN AN ORGANIZED HEALTH CARE EDUCATION/TRAINING PROGRAM

## 2022-03-07 PROCEDURE — 1101F PT FALLS ASSESS-DOCD LE1/YR: CPT | Mod: CPTII,S$GLB,, | Performed by: STUDENT IN AN ORGANIZED HEALTH CARE EDUCATION/TRAINING PROGRAM

## 2022-03-07 PROCEDURE — 99203 OFFICE O/P NEW LOW 30 MIN: CPT | Mod: S$GLB,,, | Performed by: STUDENT IN AN ORGANIZED HEALTH CARE EDUCATION/TRAINING PROGRAM

## 2022-03-07 PROCEDURE — 99203 PR OFFICE/OUTPT VISIT, NEW, LEVL III, 30-44 MIN: ICD-10-PCS | Mod: S$GLB,,, | Performed by: STUDENT IN AN ORGANIZED HEALTH CARE EDUCATION/TRAINING PROGRAM

## 2022-03-07 PROCEDURE — 1111F PR DISCHARGE MEDS RECONCILED W/ CURRENT OUTPATIENT MED LIST: ICD-10-PCS | Mod: CPTII,S$GLB,, | Performed by: STUDENT IN AN ORGANIZED HEALTH CARE EDUCATION/TRAINING PROGRAM

## 2022-03-07 PROCEDURE — 1159F PR MEDICATION LIST DOCUMENTED IN MEDICAL RECORD: ICD-10-PCS | Mod: CPTII,S$GLB,, | Performed by: STUDENT IN AN ORGANIZED HEALTH CARE EDUCATION/TRAINING PROGRAM

## 2022-03-07 PROCEDURE — 1126F AMNT PAIN NOTED NONE PRSNT: CPT | Mod: CPTII,S$GLB,, | Performed by: STUDENT IN AN ORGANIZED HEALTH CARE EDUCATION/TRAINING PROGRAM

## 2022-03-07 NOTE — PROGRESS NOTES
History & Physical    SUBJECTIVE:     History of Present Illness:  Patient is a 81 y.o. female presents with a right jawline abscess.  She was started on doxycycline on Friday and sent to me for evaluation.  She reports that since starting antibiotics, the area has improved by at least 90%.  She is asymptomatic currently.    Chief Complaint   Patient presents with    Consult       Review of patient's allergies indicates:  No Known Allergies    Current Outpatient Medications   Medication Sig Dispense Refill    alcohol antiseptic pads (ALCOHOL PADS TOP) USE TWICE DAILY PRIOR TO FINGER STICK      alcohol antiseptic pads (ALCOHOL SWABS TOP)       amLODIPine (NORVASC) 10 MG tablet Take 10 mg by mouth once daily.      apixaban 5 mg Tab Take 1 tablet (5 mg total) by mouth 2 (two) times daily. 60 tablet 5    aspirin (ECOTRIN) 81 MG EC tablet Take 81 mg by mouth once daily.      cloNIDine (CATAPRES) 0.1 MG tablet Take 0.1 mg by mouth 2 (two) times daily.      dabigatran etexilate (PRADAXA) 75 mg Cap Take 1 capsule (75 mg total) by mouth 2 (two) times a day. 60 capsule 2    doxycycline (VIBRA-TABS) 100 MG tablet Take 1 tablet (100 mg total) by mouth 2 (two) times daily. 20 tablet 0    ergocalciferol (ERGOCALCIFEROL) 50,000 unit Cap Take 1,250 mcg by mouth once daily.      esomeprazole (NEXIUM) 40 MG capsule Take 1 capsule (40 mg total) by mouth once daily. 30 capsule 2    lactulose (CHRONULAC) 10 gram/15 mL solution Take 30 mLs (20 g total) by mouth 3 (three) times daily..  Once you have regular bowel movements, decrease to 30 mLs by mouth once daily. 2700 mL 0    simvastatin (ZOCOR) 40 MG tablet Take 40 mg by mouth once daily.      traMADoL (ULTRAM) 50 mg tablet Take 50 mg by mouth 3 (three) times daily as needed.      TRAVATAN Z 0.004 % Drop 1 drop 2 (two) times daily.        No current facility-administered medications for this visit.       Past Medical History:   Diagnosis Date    Arthritis     DM2  (diabetes mellitus, type 2)     Glaucoma capsular     HLD (hyperlipidemia)     Hypertension     Kidney stones     Skin lesion of breast      Past Surgical History:   Procedure Laterality Date    BREAST SURGERY      removed lump to right breast     EYE SURGERY      URETERAL STENT PLACEMENT      and removed     Family History   Problem Relation Age of Onset    Coronary artery disease Mother     Diabetes Mother      Social History     Tobacco Use    Smoking status: Former Smoker    Smokeless tobacco: Never Used   Substance Use Topics    Alcohol use: No    Drug use: No        Review of Systems:  Review of Systems   Constitutional: Negative for fever.   HENT: Negative.    Eyes: Negative.    Respiratory: Negative.    Cardiovascular: Negative.    Gastrointestinal: Negative.    Endocrine: Negative.    Genitourinary: Negative.    Musculoskeletal: Negative.    Skin: Negative.    Allergic/Immunologic: Negative.    Neurological: Negative.    Hematological: Negative.    Psychiatric/Behavioral: Negative.        OBJECTIVE:     Vital Signs (Most Recent)              Physical Exam:  Physical Exam  Constitutional:       General: She is not in acute distress.     Appearance: Normal appearance. She is not ill-appearing, toxic-appearing or diaphoretic.   HENT:      Head: Normocephalic.      Nose: Nose normal.   Eyes:      Conjunctiva/sclera: Conjunctivae normal.   Cardiovascular:      Rate and Rhythm: Normal rate and regular rhythm.   Pulmonary:      Effort: Pulmonary effort is normal.   Abdominal:      Palpations: Abdomen is soft.   Musculoskeletal:         General: Normal range of motion.      Cervical back: Normal range of motion.   Skin:     General: Skin is warm.      Comments: Just inferior to the right ear there is a slightly indurated area without significant fluctuance.  No clear evidence of cellulitis.   Neurological:      General: No focal deficit present.      Mental Status: She is alert.   Psychiatric:          Mood and Affect: Mood normal.           ASSESSMENT/PLAN:      81-year-old wheelchair-bound female referred to me for a right jawline abscess.  She was started on doxycycline previously.  She reports that the area has improved by at least 90%.  She is relatively asymptomatic currently.    PLAN:  No significant fluctuance on exam that would warrant I and D currently.  Given the location, if I and D is needed, she may be better served by ENT.  Recommend continuing out her antibiotics for now.  She will call the office to set up follow-up or we can coordinate care to set up follow-up with ENT should symptoms fail to completely improve or worsen.

## 2022-03-17 ENCOUNTER — OFFICE VISIT (OUTPATIENT)
Dept: HEMATOLOGY/ONCOLOGY | Facility: CLINIC | Age: 82
End: 2022-03-17
Payer: MEDICARE

## 2022-03-17 VITALS
HEIGHT: 63 IN | DIASTOLIC BLOOD PRESSURE: 59 MMHG | TEMPERATURE: 98 F | HEART RATE: 81 BPM | BODY MASS INDEX: 24.09 KG/M2 | RESPIRATION RATE: 18 BRPM | SYSTOLIC BLOOD PRESSURE: 100 MMHG

## 2022-03-17 DIAGNOSIS — I82.413 ACUTE BILATERAL DEEP VEIN THROMBOSIS (DVT) OF FEMORAL VEINS: Chronic | ICD-10-CM

## 2022-03-17 PROCEDURE — 1126F AMNT PAIN NOTED NONE PRSNT: CPT | Mod: S$GLB,,, | Performed by: INTERNAL MEDICINE

## 2022-03-17 PROCEDURE — 1101F PR PT FALLS ASSESS DOC 0-1 FALLS W/OUT INJ PAST YR: ICD-10-PCS | Mod: S$GLB,,, | Performed by: INTERNAL MEDICINE

## 2022-03-17 PROCEDURE — 1126F PR PAIN SEVERITY QUANTIFIED, NO PAIN PRESENT: ICD-10-PCS | Mod: S$GLB,,, | Performed by: INTERNAL MEDICINE

## 2022-03-17 PROCEDURE — 1101F PT FALLS ASSESS-DOCD LE1/YR: CPT | Mod: S$GLB,,, | Performed by: INTERNAL MEDICINE

## 2022-03-17 PROCEDURE — 1111F DSCHRG MED/CURRENT MED MERGE: CPT | Mod: S$GLB,,, | Performed by: INTERNAL MEDICINE

## 2022-03-17 PROCEDURE — 1160F RVW MEDS BY RX/DR IN RCRD: CPT | Mod: S$GLB,,, | Performed by: INTERNAL MEDICINE

## 2022-03-17 PROCEDURE — 3288F PR FALLS RISK ASSESSMENT DOCUMENTED: ICD-10-PCS | Mod: S$GLB,,, | Performed by: INTERNAL MEDICINE

## 2022-03-17 PROCEDURE — 99214 PR OFFICE/OUTPT VISIT, EST, LEVL IV, 30-39 MIN: ICD-10-PCS | Mod: S$GLB,,, | Performed by: INTERNAL MEDICINE

## 2022-03-17 PROCEDURE — 3078F DIAST BP <80 MM HG: CPT | Mod: S$GLB,,, | Performed by: INTERNAL MEDICINE

## 2022-03-17 PROCEDURE — 3074F SYST BP LT 130 MM HG: CPT | Mod: S$GLB,,, | Performed by: INTERNAL MEDICINE

## 2022-03-17 PROCEDURE — 99214 OFFICE O/P EST MOD 30 MIN: CPT | Mod: S$GLB,,, | Performed by: INTERNAL MEDICINE

## 2022-03-17 PROCEDURE — 3078F PR MOST RECENT DIASTOLIC BLOOD PRESSURE < 80 MM HG: ICD-10-PCS | Mod: S$GLB,,, | Performed by: INTERNAL MEDICINE

## 2022-03-17 PROCEDURE — 1159F PR MEDICATION LIST DOCUMENTED IN MEDICAL RECORD: ICD-10-PCS | Mod: S$GLB,,, | Performed by: INTERNAL MEDICINE

## 2022-03-17 PROCEDURE — 1160F PR REVIEW ALL MEDS BY PRESCRIBER/CLIN PHARMACIST DOCUMENTED: ICD-10-PCS | Mod: S$GLB,,, | Performed by: INTERNAL MEDICINE

## 2022-03-17 PROCEDURE — 3074F PR MOST RECENT SYSTOLIC BLOOD PRESSURE < 130 MM HG: ICD-10-PCS | Mod: S$GLB,,, | Performed by: INTERNAL MEDICINE

## 2022-03-17 PROCEDURE — 3288F FALL RISK ASSESSMENT DOCD: CPT | Mod: S$GLB,,, | Performed by: INTERNAL MEDICINE

## 2022-03-17 PROCEDURE — 1159F MED LIST DOCD IN RCRD: CPT | Mod: S$GLB,,, | Performed by: INTERNAL MEDICINE

## 2022-03-17 PROCEDURE — 1111F PR DISCHARGE MEDS RECONCILED W/ CURRENT OUTPATIENT MED LIST: ICD-10-PCS | Mod: S$GLB,,, | Performed by: INTERNAL MEDICINE

## 2022-03-17 NOTE — PROGRESS NOTES
PROGRESS NOTE    Subjective:       Patient ID: Azul Clifford is a 81 y.o. female.    Chief Complaint:  No chief complaint on file.  Recurrent DVT/PE follow up    History of Present Illness:   Azul Clifford is a 81 y.o. female who presents for follow up of above.      Ms. Clifford was discharged on Pradaxa but also resumed her Eliquis which she was prescribed prior to her hospitalization but had not been taking at the time of her new blood clotting event.       Patient remains on Pradaxa as of today, 3/17/2022    PMH:  HPI 2/14/2022 Hospital Consult:  Ms. Clifford is a 80yo female admitted to [facility] 2/13/2022 after she was found down and unresponsive at home.  Evidently she was awake and alert in the ER but complaining of sob.  She has a significant history of PE/DVT in April of 2018.  I was able to verify on LE US and V/Q done on 4/14/2018 which showed a RLE DVT and very high prob v/q.   She is now found to have a LLE DVT and int-high prob V/Q and has been started on Lovenox 70mg SC.  She states she is feeling better at this time.   She does not appear to have been on anticoagulants, other than ASA and is unsure why.      Family and Social history reviewed and is unchanged from 2/14/2022-hospital consult note      ROS:  Review of Systems   Constitutional: Negative for fever.   Respiratory: Negative for shortness of breath.    Cardiovascular: Negative for chest pain and leg swelling.   Gastrointestinal: Negative for abdominal pain and blood in stool.   Genitourinary: Negative for hematuria.   Skin: Negative for rash.          Current Outpatient Medications:     alcohol antiseptic pads (ALCOHOL PADS TOP), USE TWICE DAILY PRIOR TO FINGER STICK, Disp: , Rfl:     alcohol antiseptic pads (ALCOHOL SWABS TOP), , Disp: , Rfl:     amLODIPine (NORVASC) 10 MG tablet, Take 10 mg by mouth once daily., Disp: , Rfl:     aspirin (ECOTRIN) 81 MG EC tablet, Take 81 mg by mouth  "once daily., Disp: , Rfl:     cloNIDine (CATAPRES) 0.1 MG tablet, Take 0.1 mg by mouth 2 (two) times daily., Disp: , Rfl:     dabigatran etexilate (PRADAXA) 75 mg Cap, Take 1 capsule (75 mg total) by mouth 2 (two) times a day., Disp: 60 capsule, Rfl: 2    ergocalciferol (ERGOCALCIFEROL) 50,000 unit Cap, Take 1,250 mcg by mouth once daily., Disp: , Rfl:     esomeprazole (NEXIUM) 40 MG capsule, Take 1 capsule (40 mg total) by mouth once daily., Disp: 30 capsule, Rfl: 2    lactulose (CHRONULAC) 10 gram/15 mL solution, Take 30 mLs (20 g total) by mouth 3 (three) times daily.. Once you have regular bowel movements, decrease to 30 mLs by mouth once daily., Disp: 2700 mL, Rfl: 0    simvastatin (ZOCOR) 40 MG tablet, Take 40 mg by mouth once daily., Disp: , Rfl:     traMADoL (ULTRAM) 50 mg tablet, Take 50 mg by mouth 3 (three) times daily as needed., Disp: , Rfl:     TRAVATAN Z 0.004 % Drop, 1 drop 2 (two) times daily. , Disp: , Rfl:     doxycycline (VIBRA-TABS) 100 MG tablet, Take 1 tablet (100 mg total) by mouth 2 (two) times daily. (Patient not taking: Reported on 3/17/2022), Disp: 20 tablet, Rfl: 0        Objective:       Physical Examination:     BP (!) 100/59   Pulse 81   Temp 97.6 °F (36.4 °C)   Resp 18   Ht 5' 3" (1.6 m)   BMI 24.09 kg/m²     Physical Exam  Constitutional:       Appearance: She is well-developed.   HENT:      Head: Normocephalic and atraumatic.      Right Ear: External ear normal.      Left Ear: External ear normal.   Eyes:      Conjunctiva/sclera: Conjunctivae normal.      Pupils: Pupils are equal, round, and reactive to light.   Neck:      Thyroid: No thyromegaly.      Trachea: No tracheal deviation.   Cardiovascular:      Rate and Rhythm: Normal rate and regular rhythm.      Heart sounds: Normal heart sounds.   Pulmonary:      Effort: Pulmonary effort is normal.      Breath sounds: Normal breath sounds.   Abdominal:      General: Bowel sounds are normal. There is no distension.      " Palpations: Abdomen is soft. There is no mass.      Tenderness: There is no abdominal tenderness.   Skin:     Findings: No rash.   Neurological:      Comments: Neuro intact througout   Psychiatric:         Behavior: Behavior normal.         Thought Content: Thought content normal.         Judgment: Judgment normal.         Labs:   No results found for this or any previous visit (from the past 336 hour(s)).  CMP  Sodium   Date Value Ref Range Status   02/18/2022 137 136 - 145 mmol/L Final     Potassium   Date Value Ref Range Status   02/18/2022 4.0 3.5 - 5.1 mmol/L Final     Chloride   Date Value Ref Range Status   02/18/2022 103 95 - 110 mmol/L Final     CO2   Date Value Ref Range Status   02/18/2022 24 23 - 29 mmol/L Final     Glucose   Date Value Ref Range Status   02/18/2022 134 (H) 70 - 110 mg/dL Final     BUN   Date Value Ref Range Status   02/18/2022 24 (H) 8 - 23 mg/dL Final     Creatinine   Date Value Ref Range Status   02/18/2022 1.5 (H) 0.5 - 1.4 mg/dL Final     Calcium   Date Value Ref Range Status   02/18/2022 9.5 8.7 - 10.5 mg/dL Final     Total Protein   Date Value Ref Range Status   02/18/2022 6.7 6.0 - 8.4 g/dL Final     Albumin   Date Value Ref Range Status   02/18/2022 2.7 (L) 3.5 - 5.2 g/dL Final     Total Bilirubin   Date Value Ref Range Status   02/18/2022 0.7 0.1 - 1.0 mg/dL Final     Comment:     For infants and newborns, interpretation of results should be based  on gestational age, weight and in agreement with clinical  observations.    Premature Infant recommended reference ranges:  Up to 24 hours.............<8.0 mg/dL  Up to 48 hours............<12.0 mg/dL  3-5 days..................<15.0 mg/dL  6-29 days.................<15.0 mg/dL       Alkaline Phosphatase   Date Value Ref Range Status   02/18/2022 101 55 - 135 U/L Final     AST   Date Value Ref Range Status   02/18/2022 12 10 - 40 U/L Final     ALT   Date Value Ref Range Status   02/18/2022 10 10 - 44 U/L Final     Anion Gap   Date  Value Ref Range Status   02/18/2022 10 8 - 16 mmol/L Final     eGFR if    Date Value Ref Range Status   02/18/2022 36.9 (A) >60 mL/min/1.73 m^2 Final     eGFR if non    Date Value Ref Range Status   02/18/2022 32.0 (A) >60 mL/min/1.73 m^2 Final     Comment:     Calculation used to obtain the estimated glomerular filtration  rate (eGFR) is the CKD-EPI equation.        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  She is doing well but made a mistake and is taking both eliquis and pradaxa.  Will have her discontinue the Eliquis and continue the pradaxa at 75mg bid dosing.  Will see her again in about 6 weeks to see how she is doing.  Her hypercoag panel is largely pending but it will make little difference as she needs life-long therapy regardless.  Spent over 25min in total care today.            Relevant Orders    CBC Auto Differential    Comprehensive Metabolic Panel          Discussion:     Follow up in about 6 weeks (around 4/28/2022).      Electronically signed by Storm Velazquez

## 2022-03-17 NOTE — ASSESSMENT & PLAN NOTE
Ms. 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  She is doing well but made a mistake and is taking both eliquis and pradaxa.  Will have her discontinue the Eliquis and continue the pradaxa at 75mg bid dosing.  Will see her again in about 6 weeks to see how she is doing.  Her hypercoag panel is largely pending but it will make little difference as she needs life-long therapy regardless.  Spent over 25min in total care today.

## 2022-03-21 ENCOUNTER — DOCUMENT SCAN (OUTPATIENT)
Dept: HOME HEALTH SERVICES | Facility: HOSPITAL | Age: 82
End: 2022-03-21
Payer: MEDICARE

## 2022-03-21 LAB
CARDIOLIPIN IGA SER IA-ACNC: <9 APL U/ML (ref 0–11)
CARDIOLIPIN IGG SER IA-ACNC: <9 GPL U/ML (ref 0–14)
CARDIOLIPIN IGM SER IA-ACNC: <9 MPL U/ML (ref 0–12)
F5 GENE MUT ANL BLD/T: NORMAL
PS IGA SER-ACNC: 1 APS UNITS (ref 0–19)
PS IGG SER-ACNC: <9 UNITS (ref 0–30)
PS IGM SER-ACNC: <10 UNITS (ref 0–30)

## 2022-05-02 ENCOUNTER — OFFICE VISIT (OUTPATIENT)
Dept: HEMATOLOGY/ONCOLOGY | Facility: CLINIC | Age: 82
End: 2022-05-02
Payer: MEDICARE

## 2022-05-02 VITALS
WEIGHT: 135.88 LBS | SYSTOLIC BLOOD PRESSURE: 134 MMHG | TEMPERATURE: 98 F | HEART RATE: 86 BPM | HEIGHT: 63 IN | BODY MASS INDEX: 24.07 KG/M2 | RESPIRATION RATE: 18 BRPM | DIASTOLIC BLOOD PRESSURE: 80 MMHG

## 2022-05-02 DIAGNOSIS — I26.99 BILATERAL PULMONARY EMBOLISM: ICD-10-CM

## 2022-05-02 PROCEDURE — 1160F RVW MEDS BY RX/DR IN RCRD: CPT | Mod: S$GLB,,, | Performed by: INTERNAL MEDICINE

## 2022-05-02 PROCEDURE — 3079F PR MOST RECENT DIASTOLIC BLOOD PRESSURE 80-89 MM HG: ICD-10-PCS | Mod: S$GLB,,, | Performed by: INTERNAL MEDICINE

## 2022-05-02 PROCEDURE — 3079F DIAST BP 80-89 MM HG: CPT | Mod: S$GLB,,, | Performed by: INTERNAL MEDICINE

## 2022-05-02 PROCEDURE — 1160F PR REVIEW ALL MEDS BY PRESCRIBER/CLIN PHARMACIST DOCUMENTED: ICD-10-PCS | Mod: S$GLB,,, | Performed by: INTERNAL MEDICINE

## 2022-05-02 PROCEDURE — 1159F MED LIST DOCD IN RCRD: CPT | Mod: S$GLB,,, | Performed by: INTERNAL MEDICINE

## 2022-05-02 PROCEDURE — 1159F PR MEDICATION LIST DOCUMENTED IN MEDICAL RECORD: ICD-10-PCS | Mod: S$GLB,,, | Performed by: INTERNAL MEDICINE

## 2022-05-02 PROCEDURE — 1101F PR PT FALLS ASSESS DOC 0-1 FALLS W/OUT INJ PAST YR: ICD-10-PCS | Mod: S$GLB,,, | Performed by: INTERNAL MEDICINE

## 2022-05-02 PROCEDURE — 3288F FALL RISK ASSESSMENT DOCD: CPT | Mod: S$GLB,,, | Performed by: INTERNAL MEDICINE

## 2022-05-02 PROCEDURE — 3075F SYST BP GE 130 - 139MM HG: CPT | Mod: S$GLB,,, | Performed by: INTERNAL MEDICINE

## 2022-05-02 PROCEDURE — 1101F PT FALLS ASSESS-DOCD LE1/YR: CPT | Mod: S$GLB,,, | Performed by: INTERNAL MEDICINE

## 2022-05-02 PROCEDURE — 3075F PR MOST RECENT SYSTOLIC BLOOD PRESS GE 130-139MM HG: ICD-10-PCS | Mod: S$GLB,,, | Performed by: INTERNAL MEDICINE

## 2022-05-02 PROCEDURE — 1126F PR PAIN SEVERITY QUANTIFIED, NO PAIN PRESENT: ICD-10-PCS | Mod: S$GLB,,, | Performed by: INTERNAL MEDICINE

## 2022-05-02 PROCEDURE — 99213 OFFICE O/P EST LOW 20 MIN: CPT | Mod: S$GLB,,, | Performed by: INTERNAL MEDICINE

## 2022-05-02 PROCEDURE — 99213 PR OFFICE/OUTPT VISIT, EST, LEVL III, 20-29 MIN: ICD-10-PCS | Mod: S$GLB,,, | Performed by: INTERNAL MEDICINE

## 2022-05-02 PROCEDURE — 1126F AMNT PAIN NOTED NONE PRSNT: CPT | Mod: S$GLB,,, | Performed by: INTERNAL MEDICINE

## 2022-05-02 PROCEDURE — 3288F PR FALLS RISK ASSESSMENT DOCUMENTED: ICD-10-PCS | Mod: S$GLB,,, | Performed by: INTERNAL MEDICINE

## 2022-05-02 RX ORDER — DABIGATRAN ETEXILATE 75 MG/1
75 CAPSULE ORAL 2 TIMES DAILY
Qty: 60 CAPSULE | Refills: 2 | Status: SHIPPED | OUTPATIENT
Start: 2022-05-02 | End: 2022-10-26

## 2022-05-02 NOTE — PROGRESS NOTES
PROGRESS NOTE    Subjective:       Patient ID: Azul Clifford is a 82 y.o. female.    Chief Complaint:  No chief complaint on file.  Recurrent DVT/PE follow up    History of Present Illness:   Azul Clifford is a 82 y.o. female who presents for follow up of above.      Patient is doing well today.  Will have a chest wall cyst lanced this week.  No new complaints at this time.     Patient remains on Pradaxa as of today, 5/2/2022.     PMH:  HPI 2/14/2022 Hospital Consult:  Ms. Clifford is a 80yo female admitted to [facility] 2/13/2022 after she was found down and unresponsive at home.  Evidently she was awake and alert in the ER but complaining of sob.  She has a significant history of PE/DVT in April of 2018.  I was able to verify on LE US and V/Q done on 4/14/2018 which showed a RLE DVT and very high prob v/q.   She is now found to have a LLE DVT and int-high prob V/Q and has been started on Lovenox 70mg SC.  She states she is feeling better at this time.   She does not appear to have been on anticoagulants, other than ASA and is unsure why.      Family and Social history reviewed and is unchanged from 2/14/2022-hospital consult note      ROS:  Review of Systems   Constitutional: Negative for fever.   Respiratory: Negative for shortness of breath.    Cardiovascular: Negative for chest pain and leg swelling.   Gastrointestinal: Negative for abdominal pain and blood in stool.   Genitourinary: Negative for hematuria.   Skin: Negative for rash.          Current Outpatient Medications:     alcohol antiseptic pads (ALCOHOL PADS TOP), USE TWICE DAILY PRIOR TO FINGER STICK, Disp: , Rfl:     alcohol antiseptic pads (ALCOHOL SWABS TOP), , Disp: , Rfl:     amLODIPine (NORVASC) 10 MG tablet, Take 10 mg by mouth once daily., Disp: , Rfl:     aspirin (ECOTRIN) 81 MG EC tablet, Take 81 mg by mouth once daily., Disp: , Rfl:     cloNIDine (CATAPRES) 0.1 MG tablet, Take 0.1 mg by  "mouth 2 (two) times daily., Disp: , Rfl:     ergocalciferol (ERGOCALCIFEROL) 50,000 unit Cap, Take 1,250 mcg by mouth once daily., Disp: , Rfl:     esomeprazole (NEXIUM) 40 MG capsule, Take 1 capsule (40 mg total) by mouth once daily., Disp: 30 capsule, Rfl: 2    simvastatin (ZOCOR) 40 MG tablet, Take 40 mg by mouth once daily., Disp: , Rfl:     traMADoL (ULTRAM) 50 mg tablet, Take 50 mg by mouth 3 (three) times daily as needed., Disp: , Rfl:     TRAVATAN Z 0.004 % Drop, 1 drop 2 (two) times daily. , Disp: , Rfl:     dabigatran etexilate (PRADAXA) 75 mg Cap, Take 1 capsule (75 mg total) by mouth 2 (two) times a day., Disp: 60 capsule, Rfl: 2    doxycycline (VIBRA-TABS) 100 MG tablet, Take 1 tablet (100 mg total) by mouth 2 (two) times daily. (Patient not taking: Reported on 5/2/2022), Disp: 20 tablet, Rfl: 0        Objective:       Physical Examination:     /80   Pulse 86   Temp 98 °F (36.7 °C)   Resp 18   Ht 5' 3" (1.6 m)   Wt 61.6 kg (135 lb 14.4 oz)   BMI 24.07 kg/m²     Physical Exam  Constitutional:       Appearance: She is well-developed.   HENT:      Head: Normocephalic and atraumatic.      Right Ear: External ear normal.      Left Ear: External ear normal.   Eyes:      Conjunctiva/sclera: Conjunctivae normal.      Pupils: Pupils are equal, round, and reactive to light.   Neck:      Thyroid: No thyromegaly.      Trachea: No tracheal deviation.   Cardiovascular:      Rate and Rhythm: Normal rate and regular rhythm.      Heart sounds: Normal heart sounds.   Pulmonary:      Effort: Pulmonary effort is normal.      Breath sounds: Normal breath sounds.   Abdominal:      General: Bowel sounds are normal. There is no distension.      Palpations: Abdomen is soft. There is no mass.      Tenderness: There is no abdominal tenderness.   Skin:     Findings: No rash.   Neurological:      Comments: Neuro intact througout   Psychiatric:         Behavior: Behavior normal.         Thought Content: Thought " content normal.         Judgment: Judgment normal.         Labs:   No results found for this or any previous visit (from the past 336 hour(s)).  CMP  Sodium   Date Value Ref Range Status   02/18/2022 137 136 - 145 mmol/L Final     Potassium   Date Value Ref Range Status   02/18/2022 4.0 3.5 - 5.1 mmol/L Final     Chloride   Date Value Ref Range Status   02/18/2022 103 95 - 110 mmol/L Final     CO2   Date Value Ref Range Status   02/18/2022 24 23 - 29 mmol/L Final     Glucose   Date Value Ref Range Status   02/18/2022 134 (H) 70 - 110 mg/dL Final     BUN   Date Value Ref Range Status   02/18/2022 24 (H) 8 - 23 mg/dL Final     Creatinine   Date Value Ref Range Status   02/18/2022 1.5 (H) 0.5 - 1.4 mg/dL Final     Calcium   Date Value Ref Range Status   02/18/2022 9.5 8.7 - 10.5 mg/dL Final     Total Protein   Date Value Ref Range Status   02/18/2022 6.7 6.0 - 8.4 g/dL Final     Albumin   Date Value Ref Range Status   02/18/2022 2.7 (L) 3.5 - 5.2 g/dL Final     Total Bilirubin   Date Value Ref Range Status   02/18/2022 0.7 0.1 - 1.0 mg/dL Final     Comment:     For infants and newborns, interpretation of results should be based  on gestational age, weight and in agreement with clinical  observations.    Premature Infant recommended reference ranges:  Up to 24 hours.............<8.0 mg/dL  Up to 48 hours............<12.0 mg/dL  3-5 days..................<15.0 mg/dL  6-29 days.................<15.0 mg/dL       Alkaline Phosphatase   Date Value Ref Range Status   02/18/2022 101 55 - 135 U/L Final     AST   Date Value Ref Range Status   02/18/2022 12 10 - 40 U/L Final     ALT   Date Value Ref Range Status   02/18/2022 10 10 - 44 U/L Final     Anion Gap   Date Value Ref Range Status   02/18/2022 10 8 - 16 mmol/L Final     eGFR if    Date Value Ref Range Status   02/18/2022 36.9 (A) >60 mL/min/1.73 m^2 Final     eGFR if non    Date Value Ref Range Status   02/18/2022 32.0 (A) >60 mL/min/1.73  m^2 Final     Comment:     Calculation used to obtain the estimated glomerular filtration  rate (eGFR) is the CKD-EPI equation.        Lab Results   Component Value Date    CEA 3.4 02/18/2022     No results found for: PSA        Assessment/Plan:     Problem List Items Addressed This Visit     Bilateral pulmonary embolism     This is a recurrent issue and she will need to be on long-term anticoagulants.  She is on Pradaxa 75mg bid and will continue this.  Will plan on seeing her again with me in six months with labs.  Note is made that hypercoag negative to this point.            Relevant Medications    dabigatran etexilate (PRADAXA) 75 mg Cap    Other Relevant Orders    CBC Auto Differential    Comprehensive Metabolic Panel          Discussion:     Follow up in about 6 months (around 11/2/2022).      Electronically signed by Storm Velazquez

## 2022-05-02 NOTE — ASSESSMENT & PLAN NOTE
This is a recurrent issue and she will need to be on long-term anticoagulants.  She is on Pradaxa 75mg bid and will continue this.  Will plan on seeing her again with me in six months with labs.  Note is made that hypercoag negative to this point.

## 2022-11-01 NOTE — PROGRESS NOTES
PROGRESS NOTE    Subjective:       Patient ID: Azul Clifford is a 82 y.o. female.    Chief Complaint:  No chief complaint on file.  Recurrent DVT/PE follow up    History of Present Illness:   Azul Clifford is a 82 y.o. female who presents for follow up of above.      Patient is doing well today. No new complaints.  No sob, chest pain or edema.     Patient remains on Pradaxa as of today, 11/1/2022    PMH:  HPI 2/14/2022 Hospital Consult:  Ms. Clifford is a 82yo female admitted to [facility] 2/13/2022 after she was found down and unresponsive at home.  Evidently she was awake and alert in the ER but complaining of sob.  She has a significant history of PE/DVT in April of 2018.  I was able to verify on LE US and V/Q done on 4/14/2018 which showed a RLE DVT and very high prob v/q.   She is now found to have a LLE DVT and int-high prob V/Q and has been started on Lovenox 70mg SC.  She states she is feeling better at this time.   She does not appear to have been on anticoagulants, other than ASA and is unsure why.      Family and Social history reviewed and is unchanged from 2/14/2022-hospital consult note      ROS:  Review of Systems   Constitutional:  Negative for fever.   Respiratory:  Negative for shortness of breath.    Cardiovascular:  Negative for chest pain and leg swelling.   Gastrointestinal:  Negative for abdominal pain and blood in stool.   Genitourinary:  Negative for hematuria.   Skin:  Negative for rash.        Current Outpatient Medications:     alcohol antiseptic pads (ALCOHOL PADS TOP), USE TWICE DAILY PRIOR TO FINGER STICK, Disp: , Rfl:     alcohol antiseptic pads (ALCOHOL SWABS TOP), , Disp: , Rfl:     amLODIPine (NORVASC) 10 MG tablet, Take 10 mg by mouth once daily., Disp: , Rfl:     aspirin (ECOTRIN) 81 MG EC tablet, Take 81 mg by mouth once daily., Disp: , Rfl:     cloNIDine (CATAPRES) 0.1 MG tablet, Take 0.1 mg by mouth 2 (two) times daily., Disp:  ", Rfl:     ergocalciferol (ERGOCALCIFEROL) 50,000 unit Cap, Take 1,250 mcg by mouth once daily., Disp: , Rfl:     PRADAXA 75 mg Cap, TAKE 1 CAPSULE(75 MG) BY MOUTH TWICE DAILY, Disp: 60 capsule, Rfl: 2    simvastatin (ZOCOR) 40 MG tablet, Take 40 mg by mouth once daily., Disp: , Rfl:     traMADoL (ULTRAM) 50 mg tablet, Take 50 mg by mouth 3 (three) times daily as needed., Disp: , Rfl:     TRAVATAN Z 0.004 % Drop, 1 drop 2 (two) times daily. , Disp: , Rfl:     doxycycline (VIBRA-TABS) 100 MG tablet, Take 1 tablet (100 mg total) by mouth 2 (two) times daily. (Patient not taking: Reported on 5/2/2022), Disp: 20 tablet, Rfl: 0    esomeprazole (NEXIUM) 40 MG capsule, Take 1 capsule (40 mg total) by mouth once daily., Disp: 30 capsule, Rfl: 2        Objective:       Physical Examination:     /66   Pulse 67   Temp 97.3 °F (36.3 °C)   Resp 18   Ht 5' 2" (1.575 m)   Wt 61.2 kg (135 lb)   BMI 24.69 kg/m²     Physical Exam  Constitutional:       Appearance: She is well-developed.   HENT:      Head: Normocephalic and atraumatic.      Right Ear: External ear normal.      Left Ear: External ear normal.   Eyes:      Conjunctiva/sclera: Conjunctivae normal.      Pupils: Pupils are equal, round, and reactive to light.   Neck:      Thyroid: No thyromegaly.      Trachea: No tracheal deviation.   Cardiovascular:      Rate and Rhythm: Normal rate and regular rhythm.      Heart sounds: Normal heart sounds.   Pulmonary:      Effort: Pulmonary effort is normal.      Breath sounds: Normal breath sounds.   Abdominal:      General: Bowel sounds are normal. There is no distension.      Palpations: Abdomen is soft. There is no mass.      Tenderness: There is no abdominal tenderness.   Skin:     Findings: No rash.   Neurological:      Comments: Neuro intact througout   Psychiatric:         Behavior: Behavior normal.         Thought Content: Thought content normal.         Judgment: Judgment normal.       Labs:   No results found for " this or any previous visit (from the past 336 hour(s)).  CMP  Sodium   Date Value Ref Range Status   02/18/2022 137 136 - 145 mmol/L Final     Potassium   Date Value Ref Range Status   02/18/2022 4.0 3.5 - 5.1 mmol/L Final     Chloride   Date Value Ref Range Status   02/18/2022 103 95 - 110 mmol/L Final     CO2   Date Value Ref Range Status   02/18/2022 24 23 - 29 mmol/L Final     Glucose   Date Value Ref Range Status   02/18/2022 134 (H) 70 - 110 mg/dL Final     BUN   Date Value Ref Range Status   02/18/2022 24 (H) 8 - 23 mg/dL Final     Creatinine   Date Value Ref Range Status   02/18/2022 1.5 (H) 0.5 - 1.4 mg/dL Final     Calcium   Date Value Ref Range Status   02/18/2022 9.5 8.7 - 10.5 mg/dL Final     Total Protein   Date Value Ref Range Status   02/18/2022 6.7 6.0 - 8.4 g/dL Final     Albumin   Date Value Ref Range Status   02/18/2022 2.7 (L) 3.5 - 5.2 g/dL Final     Total Bilirubin   Date Value Ref Range Status   02/18/2022 0.7 0.1 - 1.0 mg/dL Final     Comment:     For infants and newborns, interpretation of results should be based  on gestational age, weight and in agreement with clinical  observations.    Premature Infant recommended reference ranges:  Up to 24 hours.............<8.0 mg/dL  Up to 48 hours............<12.0 mg/dL  3-5 days..................<15.0 mg/dL  6-29 days.................<15.0 mg/dL       Alkaline Phosphatase   Date Value Ref Range Status   02/18/2022 101 55 - 135 U/L Final     AST   Date Value Ref Range Status   02/18/2022 12 10 - 40 U/L Final     ALT   Date Value Ref Range Status   02/18/2022 10 10 - 44 U/L Final     Anion Gap   Date Value Ref Range Status   02/18/2022 10 8 - 16 mmol/L Final     eGFR if    Date Value Ref Range Status   02/18/2022 36.9 (A) >60 mL/min/1.73 m^2 Final     eGFR if non    Date Value Ref Range Status   02/18/2022 32.0 (A) >60 mL/min/1.73 m^2 Final     Comment:     Calculation used to obtain the estimated glomerular  filtration  rate (eGFR) is the CKD-EPI equation.        Lab Results   Component Value Date    CEA 3.4 02/18/2022     No results found for: PSA        Assessment/Plan:     Problem List Items Addressed This Visit       Bilateral pulmonary embolism     Ms. Clifford is doing well today.  She remains on Pradaxa and seems to be tolerating this well.  She has no new breathing issues and no bleeding.  Will continue therapy and will continue to see her every six months.              Discussion:     Follow up in about 6 months (around 5/2/2023).      Electronically signed by Storm Velazquez

## 2022-11-02 ENCOUNTER — OFFICE VISIT (OUTPATIENT)
Dept: HEMATOLOGY/ONCOLOGY | Facility: CLINIC | Age: 82
End: 2022-11-02
Payer: MEDICARE

## 2022-11-02 VITALS
BODY MASS INDEX: 24.84 KG/M2 | TEMPERATURE: 97 F | RESPIRATION RATE: 18 BRPM | DIASTOLIC BLOOD PRESSURE: 66 MMHG | SYSTOLIC BLOOD PRESSURE: 127 MMHG | HEIGHT: 62 IN | WEIGHT: 135 LBS | HEART RATE: 67 BPM

## 2022-11-02 DIAGNOSIS — I26.99 BILATERAL PULMONARY EMBOLISM: ICD-10-CM

## 2022-11-02 PROCEDURE — 3288F FALL RISK ASSESSMENT DOCD: CPT | Mod: CPTII,S$GLB,, | Performed by: INTERNAL MEDICINE

## 2022-11-02 PROCEDURE — 1126F PR PAIN SEVERITY QUANTIFIED, NO PAIN PRESENT: ICD-10-PCS | Mod: CPTII,S$GLB,, | Performed by: INTERNAL MEDICINE

## 2022-11-02 PROCEDURE — 3074F SYST BP LT 130 MM HG: CPT | Mod: CPTII,S$GLB,, | Performed by: INTERNAL MEDICINE

## 2022-11-02 PROCEDURE — 1101F PR PT FALLS ASSESS DOC 0-1 FALLS W/OUT INJ PAST YR: ICD-10-PCS | Mod: CPTII,S$GLB,, | Performed by: INTERNAL MEDICINE

## 2022-11-02 PROCEDURE — 3078F DIAST BP <80 MM HG: CPT | Mod: CPTII,S$GLB,, | Performed by: INTERNAL MEDICINE

## 2022-11-02 PROCEDURE — 3288F PR FALLS RISK ASSESSMENT DOCUMENTED: ICD-10-PCS | Mod: CPTII,S$GLB,, | Performed by: INTERNAL MEDICINE

## 2022-11-02 PROCEDURE — 1101F PT FALLS ASSESS-DOCD LE1/YR: CPT | Mod: CPTII,S$GLB,, | Performed by: INTERNAL MEDICINE

## 2022-11-02 PROCEDURE — 1159F MED LIST DOCD IN RCRD: CPT | Mod: CPTII,S$GLB,, | Performed by: INTERNAL MEDICINE

## 2022-11-02 PROCEDURE — 99213 OFFICE O/P EST LOW 20 MIN: CPT | Mod: S$GLB,,, | Performed by: INTERNAL MEDICINE

## 2022-11-02 PROCEDURE — 3074F PR MOST RECENT SYSTOLIC BLOOD PRESSURE < 130 MM HG: ICD-10-PCS | Mod: CPTII,S$GLB,, | Performed by: INTERNAL MEDICINE

## 2022-11-02 PROCEDURE — 1126F AMNT PAIN NOTED NONE PRSNT: CPT | Mod: CPTII,S$GLB,, | Performed by: INTERNAL MEDICINE

## 2022-11-02 PROCEDURE — 1159F PR MEDICATION LIST DOCUMENTED IN MEDICAL RECORD: ICD-10-PCS | Mod: CPTII,S$GLB,, | Performed by: INTERNAL MEDICINE

## 2022-11-02 PROCEDURE — 99213 PR OFFICE/OUTPT VISIT, EST, LEVL III, 20-29 MIN: ICD-10-PCS | Mod: S$GLB,,, | Performed by: INTERNAL MEDICINE

## 2022-11-02 PROCEDURE — 3078F PR MOST RECENT DIASTOLIC BLOOD PRESSURE < 80 MM HG: ICD-10-PCS | Mod: CPTII,S$GLB,, | Performed by: INTERNAL MEDICINE

## 2022-11-02 NOTE — ASSESSMENT & PLAN NOTE
Ms. Clifford is doing well today.  She remains on Pradaxa and seems to be tolerating this well.  She has no new breathing issues and no bleeding.  Will continue therapy and will continue to see her every six months.

## 2023-05-02 ENCOUNTER — OFFICE VISIT (OUTPATIENT)
Dept: HEMATOLOGY/ONCOLOGY | Facility: CLINIC | Age: 83
End: 2023-05-02
Payer: MEDICARE

## 2023-05-02 VITALS
DIASTOLIC BLOOD PRESSURE: 45 MMHG | TEMPERATURE: 98 F | SYSTOLIC BLOOD PRESSURE: 84 MMHG | WEIGHT: 145.69 LBS | HEART RATE: 74 BPM | BODY MASS INDEX: 26.65 KG/M2

## 2023-05-02 DIAGNOSIS — I26.99 BILATERAL PULMONARY EMBOLISM: ICD-10-CM

## 2023-05-02 PROCEDURE — 1101F PR PT FALLS ASSESS DOC 0-1 FALLS W/OUT INJ PAST YR: ICD-10-PCS | Mod: CPTII,S$GLB,, | Performed by: INTERNAL MEDICINE

## 2023-05-02 PROCEDURE — 1126F PR PAIN SEVERITY QUANTIFIED, NO PAIN PRESENT: ICD-10-PCS | Mod: CPTII,S$GLB,, | Performed by: INTERNAL MEDICINE

## 2023-05-02 PROCEDURE — 3078F PR MOST RECENT DIASTOLIC BLOOD PRESSURE < 80 MM HG: ICD-10-PCS | Mod: CPTII,S$GLB,, | Performed by: INTERNAL MEDICINE

## 2023-05-02 PROCEDURE — 99213 OFFICE O/P EST LOW 20 MIN: CPT | Mod: S$GLB,,, | Performed by: INTERNAL MEDICINE

## 2023-05-02 PROCEDURE — 1159F PR MEDICATION LIST DOCUMENTED IN MEDICAL RECORD: ICD-10-PCS | Mod: CPTII,S$GLB,, | Performed by: INTERNAL MEDICINE

## 2023-05-02 PROCEDURE — 3078F DIAST BP <80 MM HG: CPT | Mod: CPTII,S$GLB,, | Performed by: INTERNAL MEDICINE

## 2023-05-02 PROCEDURE — 3074F PR MOST RECENT SYSTOLIC BLOOD PRESSURE < 130 MM HG: ICD-10-PCS | Mod: CPTII,S$GLB,, | Performed by: INTERNAL MEDICINE

## 2023-05-02 PROCEDURE — 1126F AMNT PAIN NOTED NONE PRSNT: CPT | Mod: CPTII,S$GLB,, | Performed by: INTERNAL MEDICINE

## 2023-05-02 PROCEDURE — 1101F PT FALLS ASSESS-DOCD LE1/YR: CPT | Mod: CPTII,S$GLB,, | Performed by: INTERNAL MEDICINE

## 2023-05-02 PROCEDURE — 1159F MED LIST DOCD IN RCRD: CPT | Mod: CPTII,S$GLB,, | Performed by: INTERNAL MEDICINE

## 2023-05-02 PROCEDURE — 99213 PR OFFICE/OUTPT VISIT, EST, LEVL III, 20-29 MIN: ICD-10-PCS | Mod: S$GLB,,, | Performed by: INTERNAL MEDICINE

## 2023-05-02 PROCEDURE — 3074F SYST BP LT 130 MM HG: CPT | Mod: CPTII,S$GLB,, | Performed by: INTERNAL MEDICINE

## 2023-05-02 PROCEDURE — 1160F PR REVIEW ALL MEDS BY PRESCRIBER/CLIN PHARMACIST DOCUMENTED: ICD-10-PCS | Mod: CPTII,S$GLB,, | Performed by: INTERNAL MEDICINE

## 2023-05-02 PROCEDURE — 3288F FALL RISK ASSESSMENT DOCD: CPT | Mod: CPTII,S$GLB,, | Performed by: INTERNAL MEDICINE

## 2023-05-02 PROCEDURE — 3288F PR FALLS RISK ASSESSMENT DOCUMENTED: ICD-10-PCS | Mod: CPTII,S$GLB,, | Performed by: INTERNAL MEDICINE

## 2023-05-02 PROCEDURE — 1160F RVW MEDS BY RX/DR IN RCRD: CPT | Mod: CPTII,S$GLB,, | Performed by: INTERNAL MEDICINE

## 2023-05-02 NOTE — ASSESSMENT & PLAN NOTE
Patient is doing well and has no new clotting issues.  She remains on Pradaxa and doing weill with this.  No bleeding.  Will continue therapy and will check CBC and CMP now.  Continue to see her on a six month basis.

## 2023-05-02 NOTE — PROGRESS NOTES
PROGRESS NOTE    Subjective:       Patient ID: Azul Clifford is a 83 y.o. female.    Chief Complaint:  No chief complaint on file.  Recurrent DVT/PE follow up    History of Present Illness:   Azul Clifford is a 83 y.o. female who presents for follow up of above.      Ms. Clifford is doing well today.  Has no complaints.  No new clots, no bleeding issues.     Patient remains on Pradaxa as of today, 5/2/2023    PMH:  HPI 2/14/2022 Hospital Consult:  Ms. Clifford is a 80yo female admitted to [facility] 2/13/2022 after she was found down and unresponsive at home.  Evidently she was awake and alert in the ER but complaining of sob.  She has a significant history of PE/DVT in April of 2018.  I was able to verify on LE US and V/Q done on 4/14/2018 which showed a RLE DVT and very high prob v/q.   She is now found to have a LLE DVT and int-high prob V/Q and has been started on Lovenox 70mg SC.  She states she is feeling better at this time.   She does not appear to have been on anticoagulants, other than ASA and is unsure why.      Family and Social history reviewed and is unchanged from 2/14/2022-hospital consult note      ROS:  Review of Systems   Constitutional:  Negative for fever.   Respiratory:  Negative for shortness of breath.    Cardiovascular:  Negative for chest pain and leg swelling.   Gastrointestinal:  Negative for abdominal pain and blood in stool.   Genitourinary:  Negative for hematuria.   Skin:  Negative for rash.        Current Outpatient Medications:     alcohol antiseptic pads (ALCOHOL PADS TOP), USE TWICE DAILY PRIOR TO FINGER STICK, Disp: , Rfl:     alcohol antiseptic pads (ALCOHOL SWABS TOP), , Disp: , Rfl:     amLODIPine (NORVASC) 10 MG tablet, Take 10 mg by mouth once daily., Disp: , Rfl:     aspirin (ECOTRIN) 81 MG EC tablet, Take 81 mg by mouth once daily., Disp: , Rfl:     cloNIDine (CATAPRES) 0.1 MG tablet, Take 0.1 mg by mouth 2 (two) times  daily., Disp: , Rfl:     doxycycline (VIBRA-TABS) 100 MG tablet, Take 1 tablet (100 mg total) by mouth 2 (two) times daily. (Patient not taking: Reported on 5/2/2022), Disp: 20 tablet, Rfl: 0    ergocalciferol (ERGOCALCIFEROL) 50,000 unit Cap, Take 1,250 mcg by mouth once daily., Disp: , Rfl:     esomeprazole (NEXIUM) 40 MG capsule, Take 1 capsule (40 mg total) by mouth once daily., Disp: 30 capsule, Rfl: 2    PRADAXA 75 mg Cap, TAKE 1 CAPSULE(75 MG) BY MOUTH TWICE DAILY, Disp: 60 capsule, Rfl: 2    simvastatin (ZOCOR) 40 MG tablet, Take 40 mg by mouth once daily., Disp: , Rfl:     traMADoL (ULTRAM) 50 mg tablet, Take 50 mg by mouth 3 (three) times daily as needed., Disp: , Rfl:     TRAVATAN Z 0.004 % Drop, 1 drop 2 (two) times daily. , Disp: , Rfl:         Objective:       Physical Examination:     BP (!) 84/45   Pulse 74   Temp 97.9 °F (36.6 °C)   Wt 66.1 kg (145 lb 11.2 oz)   BMI 26.65 kg/m²     Physical Exam  Constitutional:       Appearance: She is well-developed.   HENT:      Head: Normocephalic and atraumatic.      Right Ear: External ear normal.      Left Ear: External ear normal.   Eyes:      Conjunctiva/sclera: Conjunctivae normal.      Pupils: Pupils are equal, round, and reactive to light.   Neck:      Thyroid: No thyromegaly.      Trachea: No tracheal deviation.   Cardiovascular:      Rate and Rhythm: Normal rate and regular rhythm.      Heart sounds: Normal heart sounds.   Pulmonary:      Effort: Pulmonary effort is normal.      Breath sounds: Normal breath sounds.   Abdominal:      General: Bowel sounds are normal. There is no distension.      Palpations: Abdomen is soft. There is no mass.      Tenderness: There is no abdominal tenderness.   Skin:     Findings: No rash.   Neurological:      Comments: Neuro intact througout   Psychiatric:         Behavior: Behavior normal.         Thought Content: Thought content normal.         Judgment: Judgment normal.       Labs:   No results found for this or  any previous visit (from the past 336 hour(s)).  CMP  Sodium   Date Value Ref Range Status   02/18/2022 137 136 - 145 mmol/L Final     Potassium   Date Value Ref Range Status   02/18/2022 4.0 3.5 - 5.1 mmol/L Final     Chloride   Date Value Ref Range Status   02/18/2022 103 95 - 110 mmol/L Final     CO2   Date Value Ref Range Status   02/18/2022 24 23 - 29 mmol/L Final     Glucose   Date Value Ref Range Status   02/18/2022 134 (H) 70 - 110 mg/dL Final     BUN   Date Value Ref Range Status   02/18/2022 24 (H) 8 - 23 mg/dL Final     Creatinine   Date Value Ref Range Status   02/18/2022 1.5 (H) 0.5 - 1.4 mg/dL Final     Calcium   Date Value Ref Range Status   02/18/2022 9.5 8.7 - 10.5 mg/dL Final     Total Protein   Date Value Ref Range Status   02/18/2022 6.7 6.0 - 8.4 g/dL Final     Albumin   Date Value Ref Range Status   02/18/2022 2.7 (L) 3.5 - 5.2 g/dL Final     Total Bilirubin   Date Value Ref Range Status   02/18/2022 0.7 0.1 - 1.0 mg/dL Final     Comment:     For infants and newborns, interpretation of results should be based  on gestational age, weight and in agreement with clinical  observations.    Premature Infant recommended reference ranges:  Up to 24 hours.............<8.0 mg/dL  Up to 48 hours............<12.0 mg/dL  3-5 days..................<15.0 mg/dL  6-29 days.................<15.0 mg/dL       Alkaline Phosphatase   Date Value Ref Range Status   02/18/2022 101 55 - 135 U/L Final     AST   Date Value Ref Range Status   02/18/2022 12 10 - 40 U/L Final     ALT   Date Value Ref Range Status   02/18/2022 10 10 - 44 U/L Final     Anion Gap   Date Value Ref Range Status   02/18/2022 10 8 - 16 mmol/L Final     eGFR if    Date Value Ref Range Status   02/18/2022 36.9 (A) >60 mL/min/1.73 m^2 Final     eGFR if non    Date Value Ref Range Status   02/18/2022 32.0 (A) >60 mL/min/1.73 m^2 Final     Comment:     Calculation used to obtain the estimated glomerular filtration  rate  (eGFR) is the CKD-EPI equation.        Lab Results   Component Value Date    CEA 3.4 02/18/2022     No results found for: PSA        Assessment/Plan:     Problem List Items Addressed This Visit       Bilateral pulmonary embolism     Patient is doing well and has no new clotting issues.  She remains on Pradaxa and doing weill with this.  No bleeding.  Will continue therapy and will check CBC and CMP now.  Continue to see her on a six month basis.             Relevant Orders    CBC Auto Differential    Comprehensive Metabolic Panel     Discussion:     Follow up in about 6 months (around 11/2/2023).      Electronically signed by Storm Velazquez

## 2023-07-07 DIAGNOSIS — I26.99 BILATERAL PULMONARY EMBOLISM: ICD-10-CM

## 2023-07-07 NOTE — TELEPHONE ENCOUNTER
----- Message from Yadira Shafer sent at 7/7/2023  1:55 PM CDT -----  Pt is needing a refill on Pradaxa 75 mg, please send to [facility] [address]. Please fill as soon as possible, she is out.      [phone]

## 2023-07-08 RX ORDER — DABIGATRAN ETEXILATE 75 MG/1
CAPSULE ORAL
Qty: 60 CAPSULE | Refills: 2 | Status: SHIPPED | OUTPATIENT
Start: 2023-07-08 | End: 2024-01-05 | Stop reason: SDUPTHER

## 2023-10-31 ENCOUNTER — TELEPHONE (OUTPATIENT)
Dept: HEMATOLOGY/ONCOLOGY | Facility: CLINIC | Age: 83
End: 2023-10-31

## 2023-10-31 NOTE — TELEPHONE ENCOUNTER
Called patient for upcoming appointment and laboratory work 11/06/2023, patient did not answer, LVM

## 2023-12-01 ENCOUNTER — CLINICAL SUPPORT (OUTPATIENT)
Dept: CARDIOLOGY | Facility: HOSPITAL | Age: 83
End: 2023-12-01
Attending: EMERGENCY MEDICINE
Payer: MEDICARE

## 2023-12-01 ENCOUNTER — HOSPITAL ENCOUNTER (OUTPATIENT)
Facility: HOSPITAL | Age: 83
Discharge: HOME | End: 2023-12-03
Attending: EMERGENCY MEDICINE | Admitting: INTERNAL MEDICINE
Payer: MEDICARE

## 2023-12-01 VITALS — WEIGHT: 130 LBS | BODY MASS INDEX: 23.92 KG/M2 | HEIGHT: 62 IN

## 2023-12-01 DIAGNOSIS — R42 LIGHTHEADEDNESS: ICD-10-CM

## 2023-12-01 DIAGNOSIS — N17.9 AKI (ACUTE KIDNEY INJURY): ICD-10-CM

## 2023-12-01 DIAGNOSIS — N39.0 UTI (URINARY TRACT INFECTION): ICD-10-CM

## 2023-12-01 DIAGNOSIS — R07.9 CHEST PAIN: ICD-10-CM

## 2023-12-01 DIAGNOSIS — K62.89 PROCTITIS: ICD-10-CM

## 2023-12-01 DIAGNOSIS — R55 SYNCOPE: ICD-10-CM

## 2023-12-01 DIAGNOSIS — R55 NEAR SYNCOPE: ICD-10-CM

## 2023-12-01 DIAGNOSIS — K62.9 RECTAL ABNORMALITY: Primary | ICD-10-CM

## 2023-12-01 PROBLEM — K59.00 CONSTIPATION: Status: ACTIVE | Noted: 2023-12-01

## 2023-12-01 PROBLEM — K59.00 CONSTIPATION: Status: RESOLVED | Noted: 2022-02-14 | Resolved: 2023-12-01

## 2023-12-01 LAB
ALBUMIN SERPL BCP-MCNC: 3.6 G/DL (ref 3.5–5.2)
ALP SERPL-CCNC: 131 U/L (ref 55–135)
ALT SERPL W/O P-5'-P-CCNC: 8 U/L (ref 10–44)
ANION GAP SERPL CALC-SCNC: 10 MMOL/L (ref 8–16)
AORTIC ROOT ANNULUS: 3.3 CM
AORTIC VALVE CUSP SEPERATION: 1.8 CM
ASCENDING AORTA: 3.3 CM
AST SERPL-CCNC: 13 U/L (ref 10–40)
AV INDEX (PROSTH): 0.91
AV MEAN GRADIENT: 3 MMHG
AV PEAK GRADIENT: 5 MMHG
AV VALVE AREA BY VELOCITY RATIO: 2.66 CM²
AV VALVE AREA: 2.59 CM²
AV VELOCITY RATIO: 0.94
BACTERIA #/AREA URNS HPF: NEGATIVE /HPF
BASOPHILS # BLD AUTO: 0.08 K/UL (ref 0–0.2)
BASOPHILS NFR BLD: 0.8 % (ref 0–1.9)
BILIRUB SERPL-MCNC: 0.4 MG/DL (ref 0.1–1)
BILIRUB UR QL STRIP: ABNORMAL
BNP SERPL-MCNC: 19 PG/ML (ref 0–99)
BSA FOR ECHO PROCEDURE: 1.61 M2
BUN SERPL-MCNC: 33 MG/DL (ref 8–23)
CALCIUM SERPL-MCNC: 9.7 MG/DL (ref 8.7–10.5)
CHLORIDE SERPL-SCNC: 106 MMOL/L (ref 95–110)
CLARITY UR: ABNORMAL
CO2 SERPL-SCNC: 22 MMOL/L (ref 23–29)
COLOR UR: YELLOW
CREAT SERPL-MCNC: 3.3 MG/DL (ref 0.5–1.4)
CREAT SERPL-MCNC: 4.1 MG/DL (ref 0.5–1.4)
CV ECHO LV RWT: 0.8 CM
DIFFERENTIAL METHOD: ABNORMAL
DOP CALC AO PEAK VEL: 1.16 M/S
DOP CALC AO VTI: 19.4 CM
DOP CALC LVOT AREA: 2.8 CM2
DOP CALC LVOT DIAMETER: 1.9 CM
DOP CALC LVOT PEAK VEL: 1.09 M/S
DOP CALC LVOT STROKE VOLUME: 50.16 CM3
DOP CALC MV VTI: 23.1 CM
DOP CALCLVOT PEAK VEL VTI: 17.7 CM
E WAVE DECELERATION TIME: 158 MSEC
E/A RATIO: 0.65
E/E' RATIO: 12.62 M/S
ECHO LV POSTERIOR WALL: 1.13 CM (ref 0.6–1.1)
EOSINOPHIL # BLD AUTO: 0.1 K/UL (ref 0–0.5)
EOSINOPHIL NFR BLD: 1.2 % (ref 0–8)
ERYTHROCYTE [DISTWIDTH] IN BLOOD BY AUTOMATED COUNT: 14.4 % (ref 11.5–14.5)
ERYTHROCYTE [SEDIMENTATION RATE] IN BLOOD BY WESTERGREN METHOD: 20 MM/HR (ref 0–20)
EST. GFR  (NO RACE VARIABLE): 13.2 ML/MIN/1.73 M^2
FRACTIONAL SHORTENING: 28 % (ref 28–44)
GLUCOSE SERPL-MCNC: 105 MG/DL (ref 70–110)
GLUCOSE SERPL-MCNC: 146 MG/DL (ref 70–110)
GLUCOSE SERPL-MCNC: 62 MG/DL (ref 70–110)
GLUCOSE SERPL-MCNC: 67 MG/DL (ref 70–110)
GLUCOSE SERPL-MCNC: 82 MG/DL (ref 70–110)
GLUCOSE UR QL STRIP: NEGATIVE
HCT VFR BLD AUTO: 43.1 % (ref 37–48.5)
HGB BLD-MCNC: 13.3 G/DL (ref 12–16)
HGB UR QL STRIP: ABNORMAL
HYALINE CASTS #/AREA URNS LPF: 29 /LPF
IMM GRANULOCYTES # BLD AUTO: 0.06 K/UL (ref 0–0.04)
IMM GRANULOCYTES NFR BLD AUTO: 0.6 % (ref 0–0.5)
INTERVENTRICULAR SEPTUM: 1.44 CM (ref 0.6–1.1)
IVC DIAMETER: 2.36 CM
KETONES UR QL STRIP: NEGATIVE
LACTATE SERPL-SCNC: 2 MMOL/L (ref 0.5–1.9)
LEFT ATRIUM VOLUME INDEX MOD: 20.8 ML/M2
LEFT ATRIUM VOLUME MOD: 33 CM3
LEFT INTERNAL DIMENSION IN SYSTOLE: 2.03 CM (ref 2.1–4)
LEFT VENTRICLE DIASTOLIC VOLUME INDEX: 18.74 ML/M2
LEFT VENTRICLE DIASTOLIC VOLUME: 29.8 ML
LEFT VENTRICLE MASS INDEX: 70 G/M2
LEFT VENTRICLE SYSTOLIC VOLUME INDEX: 8.3 ML/M2
LEFT VENTRICLE SYSTOLIC VOLUME: 13.2 ML
LEFT VENTRICULAR INTERNAL DIMENSION IN DIASTOLE: 2.81 CM (ref 3.5–6)
LEFT VENTRICULAR MASS: 111.7 G
LEUKOCYTE ESTERASE UR QL STRIP: ABNORMAL
LV LATERAL E/E' RATIO: 11.71 M/S
LV SEPTAL E/E' RATIO: 13.67 M/S
LVOT MG: 3 MMHG
LVOT MV: 0.71 CM/S
LYMPHOCYTES # BLD AUTO: 2.1 K/UL (ref 1–4.8)
LYMPHOCYTES NFR BLD: 22 % (ref 18–48)
MAGNESIUM SERPL-MCNC: 1.8 MG/DL (ref 1.6–2.6)
MCH RBC QN AUTO: 26.5 PG (ref 27–31)
MCHC RBC AUTO-ENTMCNC: 30.9 G/DL (ref 32–36)
MCV RBC AUTO: 86 FL (ref 82–98)
MICROSCOPIC COMMENT: ABNORMAL
MONOCYTES # BLD AUTO: 0.7 K/UL (ref 0.3–1)
MONOCYTES NFR BLD: 6.9 % (ref 4–15)
MV MEAN GRADIENT: 4 MMHG
MV PEAK A VEL: 1.27 M/S
MV PEAK E VEL: 0.82 M/S
MV PEAK GRADIENT: 7 MMHG
MV STENOSIS PRESSURE HALF TIME: 62 MS
MV VALVE AREA BY CONTINUITY EQUATION: 2.17 CM2
MV VALVE AREA P 1/2 METHOD: 3.55 CM2
NEUTROPHILS # BLD AUTO: 6.7 K/UL (ref 1.8–7.7)
NEUTROPHILS NFR BLD: 68.5 % (ref 38–73)
NITRITE UR QL STRIP: NEGATIVE
NRBC BLD-RTO: 0 /100 WBC
OHS LV EJECTION FRACTION SIMPSONS BIPLANE MOD: 64 %
PH UR STRIP: 6 [PH] (ref 5–8)
PLATELET # BLD AUTO: 311 K/UL (ref 150–450)
PMV BLD AUTO: 10 FL (ref 9.2–12.9)
POTASSIUM SERPL-SCNC: 3.8 MMOL/L (ref 3.5–5.1)
PROCALCITONIN SERPL IA-MCNC: 0.32 NG/ML (ref 0–0.5)
PROT SERPL-MCNC: 7.3 G/DL (ref 6–8.4)
PROT UR QL STRIP: ABNORMAL
PV MV: 0.54 M/S
PV PEAK GRADIENT: 2 MMHG
PV PEAK VELOCITY: 0.78 M/S
RA PRESSURE ESTIMATED: 3 MMHG
RA PRESSURE: 15 MMHG
RBC # BLD AUTO: 5.01 M/UL (ref 4–5.4)
RBC #/AREA URNS HPF: 1 /HPF (ref 0–4)
RV TISSUE DOPPLER FREE WALL SYSTOLIC VELOCITY 1 (APICAL 4 CHAMBER VIEW): 10.8 CM/S
SAMPLE: ABNORMAL
SINUS: 2.78 CM
SODIUM SERPL-SCNC: 138 MMOL/L (ref 136–145)
SP GR UR STRIP: 1.02 (ref 1–1.03)
SQUAMOUS #/AREA URNS HPF: 6 /HPF
STJ: 2.32 CM
TDI LATERAL: 0.07 M/S
TDI SEPTAL: 0.06 M/S
TDI: 0.07 M/S
TRICUSPID ANNULAR PLANE SYSTOLIC EXCURSION: 1.09 CM
TROPONIN I SERPL HS-MCNC: 14.5 PG/ML (ref 0–14.9)
TSH SERPL DL<=0.005 MIU/L-ACNC: 0.86 UIU/ML (ref 0.34–5.6)
URN SPEC COLLECT METH UR: ABNORMAL
UROBILINOGEN UR STRIP-ACNC: ABNORMAL EU/DL
VIT B12 SERPL-MCNC: 325 PG/ML (ref 210–950)
WBC # BLD AUTO: 9.73 K/UL (ref 3.9–12.7)
WBC #/AREA URNS HPF: >100 /HPF (ref 0–5)
Z-SCORE OF LEFT VENTRICULAR DIMENSION IN END DIASTOLE: -4.79
Z-SCORE OF LEFT VENTRICULAR DIMENSION IN END SYSTOLE: -2.59

## 2023-12-01 PROCEDURE — 99285 EMERGENCY DEPT VISIT HI MDM: CPT | Mod: 25

## 2023-12-01 PROCEDURE — 96365 THER/PROPH/DIAG IV INF INIT: CPT | Mod: 59

## 2023-12-01 PROCEDURE — 25000003 PHARM REV CODE 250: Performed by: PHYSICAL THERAPY ASSISTANT

## 2023-12-01 PROCEDURE — 84443 ASSAY THYROID STIM HORMONE: CPT | Performed by: PHYSICAL THERAPY ASSISTANT

## 2023-12-01 PROCEDURE — 93306 TTE W/DOPPLER COMPLETE: CPT

## 2023-12-01 PROCEDURE — 83605 ASSAY OF LACTIC ACID: CPT | Performed by: EMERGENCY MEDICINE

## 2023-12-01 PROCEDURE — 82607 VITAMIN B-12: CPT | Performed by: PHYSICAL THERAPY ASSISTANT

## 2023-12-01 PROCEDURE — 63600175 PHARM REV CODE 636 W HCPCS: Performed by: EMERGENCY MEDICINE

## 2023-12-01 PROCEDURE — 25000003 PHARM REV CODE 250: Performed by: EMERGENCY MEDICINE

## 2023-12-01 PROCEDURE — 85651 RBC SED RATE NONAUTOMATED: CPT | Performed by: PHYSICAL THERAPY ASSISTANT

## 2023-12-01 PROCEDURE — 93306 ECHO (CUPID ONLY): ICD-10-PCS | Mod: 26,,, | Performed by: INTERNAL MEDICINE

## 2023-12-01 PROCEDURE — 36415 COLL VENOUS BLD VENIPUNCTURE: CPT | Performed by: EMERGENCY MEDICINE

## 2023-12-01 PROCEDURE — 85025 COMPLETE CBC W/AUTO DIFF WBC: CPT | Performed by: EMERGENCY MEDICINE

## 2023-12-01 PROCEDURE — 93306 TTE W/DOPPLER COMPLETE: CPT | Mod: 26,,, | Performed by: INTERNAL MEDICINE

## 2023-12-01 PROCEDURE — 81001 URINALYSIS AUTO W/SCOPE: CPT | Performed by: EMERGENCY MEDICINE

## 2023-12-01 PROCEDURE — 84484 ASSAY OF TROPONIN QUANT: CPT | Performed by: EMERGENCY MEDICINE

## 2023-12-01 PROCEDURE — 87040 BLOOD CULTURE FOR BACTERIA: CPT | Mod: 59 | Performed by: EMERGENCY MEDICINE

## 2023-12-01 PROCEDURE — 83880 ASSAY OF NATRIURETIC PEPTIDE: CPT | Performed by: EMERGENCY MEDICINE

## 2023-12-01 PROCEDURE — 87086 URINE CULTURE/COLONY COUNT: CPT | Performed by: EMERGENCY MEDICINE

## 2023-12-01 PROCEDURE — 96360 HYDRATION IV INFUSION INIT: CPT | Mod: 59

## 2023-12-01 PROCEDURE — G0378 HOSPITAL OBSERVATION PER HR: HCPCS

## 2023-12-01 PROCEDURE — 82962 GLUCOSE BLOOD TEST: CPT

## 2023-12-01 PROCEDURE — 80053 COMPREHEN METABOLIC PANEL: CPT | Performed by: EMERGENCY MEDICINE

## 2023-12-01 PROCEDURE — 96361 HYDRATE IV INFUSION ADD-ON: CPT

## 2023-12-01 PROCEDURE — 83735 ASSAY OF MAGNESIUM: CPT | Performed by: PHYSICAL THERAPY ASSISTANT

## 2023-12-01 PROCEDURE — 93005 ELECTROCARDIOGRAM TRACING: CPT | Performed by: GENERAL PRACTICE

## 2023-12-01 PROCEDURE — 82565 ASSAY OF CREATININE: CPT

## 2023-12-01 PROCEDURE — 93010 EKG 12-LEAD: ICD-10-PCS | Mod: ,,, | Performed by: GENERAL PRACTICE

## 2023-12-01 PROCEDURE — 84145 PROCALCITONIN (PCT): CPT | Performed by: EMERGENCY MEDICINE

## 2023-12-01 PROCEDURE — 93010 ELECTROCARDIOGRAM REPORT: CPT | Mod: ,,, | Performed by: GENERAL PRACTICE

## 2023-12-01 PROCEDURE — 86140 C-REACTIVE PROTEIN: CPT | Performed by: PHYSICAL THERAPY ASSISTANT

## 2023-12-01 RX ORDER — PANTOPRAZOLE SODIUM 40 MG/10ML
40 INJECTION, POWDER, LYOPHILIZED, FOR SOLUTION INTRAVENOUS DAILY
Status: DISCONTINUED | OUTPATIENT
Start: 2023-12-02 | End: 2023-12-03 | Stop reason: HOSPADM

## 2023-12-01 RX ORDER — ENOXAPARIN SODIUM 100 MG/ML
1 INJECTION SUBCUTANEOUS EVERY 24 HOURS
Status: DISCONTINUED | OUTPATIENT
Start: 2023-12-02 | End: 2023-12-03 | Stop reason: HOSPADM

## 2023-12-01 RX ORDER — IBUPROFEN 200 MG
16 TABLET ORAL
Status: DISCONTINUED | OUTPATIENT
Start: 2023-12-01 | End: 2023-12-03 | Stop reason: HOSPADM

## 2023-12-01 RX ORDER — INSULIN ASPART 100 [IU]/ML
0-5 INJECTION, SOLUTION INTRAVENOUS; SUBCUTANEOUS
Status: DISCONTINUED | OUTPATIENT
Start: 2023-12-01 | End: 2023-12-03 | Stop reason: HOSPADM

## 2023-12-01 RX ORDER — DOCUSATE SODIUM 100 MG/1
100 CAPSULE, LIQUID FILLED ORAL DAILY
Status: DISCONTINUED | OUTPATIENT
Start: 2023-12-01 | End: 2023-12-03 | Stop reason: HOSPADM

## 2023-12-01 RX ORDER — ACETAMINOPHEN 325 MG/1
650 TABLET ORAL EVERY 4 HOURS PRN
Status: DISCONTINUED | OUTPATIENT
Start: 2023-12-01 | End: 2023-12-03 | Stop reason: HOSPADM

## 2023-12-01 RX ORDER — SODIUM CHLORIDE 0.9 % (FLUSH) 0.9 %
10 SYRINGE (ML) INJECTION EVERY 12 HOURS PRN
Status: DISCONTINUED | OUTPATIENT
Start: 2023-12-01 | End: 2023-12-03 | Stop reason: HOSPADM

## 2023-12-01 RX ORDER — OLMESARTAN MEDOXOMIL / AMLODIPINE BESYLATE / HYDROCHLOROTHIAZIDE 40; 10; 12.5 MG/1; MG/1; MG/1
1 TABLET, FILM COATED ORAL DAILY
Status: ON HOLD | COMMUNITY
End: 2023-12-03 | Stop reason: HOSPADM

## 2023-12-01 RX ORDER — HEPARIN SODIUM 5000 [USP'U]/ML
5000 INJECTION, SOLUTION INTRAVENOUS; SUBCUTANEOUS EVERY 8 HOURS
Status: CANCELLED | OUTPATIENT
Start: 2023-12-01

## 2023-12-01 RX ORDER — EZETIMIBE 10 MG/1
10 TABLET ORAL NIGHTLY
Status: DISCONTINUED | OUTPATIENT
Start: 2023-12-01 | End: 2023-12-03 | Stop reason: HOSPADM

## 2023-12-01 RX ORDER — ENOXAPARIN SODIUM 100 MG/ML
1 INJECTION SUBCUTANEOUS EVERY 24 HOURS
Status: DISCONTINUED | OUTPATIENT
Start: 2023-12-02 | End: 2023-12-01

## 2023-12-01 RX ORDER — DABIGATRAN ETEXILATE 75 MG/1
75 CAPSULE ORAL 2 TIMES DAILY
Status: DISCONTINUED | OUTPATIENT
Start: 2023-12-01 | End: 2023-12-01

## 2023-12-01 RX ORDER — NALOXONE HCL 0.4 MG/ML
0.02 VIAL (ML) INJECTION
Status: DISCONTINUED | OUTPATIENT
Start: 2023-12-01 | End: 2023-12-03 | Stop reason: HOSPADM

## 2023-12-01 RX ORDER — TALC
6 POWDER (GRAM) TOPICAL NIGHTLY PRN
Status: DISCONTINUED | OUTPATIENT
Start: 2023-12-01 | End: 2023-12-03 | Stop reason: HOSPADM

## 2023-12-01 RX ORDER — SODIUM CHLORIDE 9 MG/ML
INJECTION, SOLUTION INTRAVENOUS CONTINUOUS
Status: DISCONTINUED | OUTPATIENT
Start: 2023-12-01 | End: 2023-12-03

## 2023-12-01 RX ORDER — GLUCAGON 1 MG
1 KIT INJECTION
Status: DISCONTINUED | OUTPATIENT
Start: 2023-12-01 | End: 2023-12-03 | Stop reason: HOSPADM

## 2023-12-01 RX ORDER — IBUPROFEN 200 MG
24 TABLET ORAL
Status: DISCONTINUED | OUTPATIENT
Start: 2023-12-01 | End: 2023-12-03 | Stop reason: HOSPADM

## 2023-12-01 RX ORDER — EZETIMIBE 10 MG/1
10 TABLET ORAL NIGHTLY
COMMUNITY
Start: 2023-07-01

## 2023-12-01 RX ORDER — SODIUM CHLORIDE 9 MG/ML
INJECTION, SOLUTION INTRAVENOUS CONTINUOUS
Status: DISCONTINUED | OUTPATIENT
Start: 2023-12-01 | End: 2023-12-01

## 2023-12-01 RX ORDER — POLYETHYLENE GLYCOL 3350 17 G/17G
17 POWDER, FOR SOLUTION ORAL DAILY
Status: DISCONTINUED | OUTPATIENT
Start: 2023-12-01 | End: 2023-12-03 | Stop reason: HOSPADM

## 2023-12-01 RX ORDER — METOCLOPRAMIDE HYDROCHLORIDE 5 MG/ML
10 INJECTION INTRAMUSCULAR; INTRAVENOUS EVERY 6 HOURS PRN
Status: DISCONTINUED | OUTPATIENT
Start: 2023-12-01 | End: 2023-12-03 | Stop reason: HOSPADM

## 2023-12-01 RX ORDER — CICLOPIROX 7.7 MG/G
GEL TOPICAL 2 TIMES DAILY
COMMUNITY
Start: 2023-11-03

## 2023-12-01 RX ADMIN — EZETIMIBE 10 MG: 10 TABLET ORAL at 10:12

## 2023-12-01 RX ADMIN — POLYETHYLENE GLYCOL 3350 17 G: 17 POWDER, FOR SOLUTION ORAL at 03:12

## 2023-12-01 RX ADMIN — SODIUM CHLORIDE 1000 ML: 0.9 INJECTION, SOLUTION INTRAVENOUS at 01:12

## 2023-12-01 RX ADMIN — DEXTROSE MONOHYDRATE 12.5 G: 25 INJECTION, SOLUTION INTRAVENOUS at 10:12

## 2023-12-01 RX ADMIN — SODIUM CHLORIDE: 0.9 INJECTION, SOLUTION INTRAVENOUS at 05:12

## 2023-12-01 RX ADMIN — DOCUSATE SODIUM 100 MG: 100 CAPSULE, LIQUID FILLED ORAL at 03:12

## 2023-12-01 RX ADMIN — SODIUM CHLORIDE 1000 ML: 0.9 INJECTION, SOLUTION INTRAVENOUS at 11:12

## 2023-12-01 RX ADMIN — CEFTRIAXONE SODIUM 1 G: 1 INJECTION, POWDER, FOR SOLUTION INTRAMUSCULAR; INTRAVENOUS at 01:12

## 2023-12-01 NOTE — HPI
Patient is an 83-year-old female with a past medical history of DM 2, CKD, hypertension, with history of PE 7 months ago on long-term Pradaxa. Patient daughter at bedside during exam. Patient presents to the ED today with complaints of near syncopal episode. Patient reports she was leaving bathroom going into main dining room at [facility] center when she suddenly became dizzy and was assisted to sit by 2 employees. Patient reports decreased systolic in 80s during episode and on arrival to ED. Patient was given 2L bolus IV fluids, blood pressure responding well thus far. Patient denies loss of consciousness or fall. Patient also complains of constipation for the last 3 days and mild abdominal discomfort. UA positive for UTI and patient was started on ceftriaxone in ED. CT of abdomen was completed which shows rectal thickening, GI consulted.  Last echo completed 02/2022 with ejection fraction of 60% and decreased dystolic function. Patient labs also reveal JAMIE with creatinine of 3.3, baseline 1.5. Patient does have CKD stage 3 and follows with Nephrology, Dr Mckeon. Patient denies current dialysis at this time. Nephrology consulted.  Patient denies chest pain, shortness for breath, dysuria, nausea or vomiting. Patient reports her dizziness has diminished since arrival to ED. Full code.

## 2023-12-01 NOTE — ASSESSMENT & PLAN NOTE
Chronic Kidney disease with acute kidney injury likely due to UTI/dehydration.   Baseline creatinine  1.5  - Labs reviewed  Renal function/electrolytes with Estimated Creatinine Clearance: 9.9 mL/min (A) (based on SCr of 3.3 mg/dL (H)).   Monitor urine output and serial BMP and adjust therapy as needed.   Avoid nephrotoxins and renally dose meds for GFR listed above.  Nephrology consulted

## 2023-12-01 NOTE — ASSESSMENT & PLAN NOTE
Complaints of decreased urine output-dehydration and mild lower abdominal pain  Ceftriaxone IV  Culture pending   Trend infection labs

## 2023-12-01 NOTE — ED PROVIDER NOTES
Encounter Date: 12/1/2023       History     Chief Complaint   Patient presents with    Near Syncope     This is an 83-year-old female with history of hypertension, diabetes, previous PE on Pradaxa, presenting after presyncopal episode at her assisted living just prior to arrival.  Patient says that she sit up she is the bathroom, became lightheaded and fell to the ground.  She denies complete loss of consciousness.  She denies any traumatic injury.  On review of systems, she says that she has been constipated the last 3 days and had some abdominal discomfort earlier.  She denies abdominal pain now.  She denies any chest pain or shortness a breath, cough, congestion, fever, nausea/vomiting/diarrhea, dysuria/frequency, or other acute symptoms.  SBP is noted to be low in the 80s.  She says she took her antihypertensives this morning.    The history is provided by the patient and the EMS personnel.     Review of patient's allergies indicates:  No Known Allergies  Past Medical History:   Diagnosis Date    Arthritis     DM2 (diabetes mellitus, type 2)     Glaucoma capsular     HLD (hyperlipidemia)     Hypertension     Kidney stones     Skin lesion of breast      Past Surgical History:   Procedure Laterality Date    BREAST SURGERY      removed lump to right breast     EYE SURGERY      URETERAL STENT PLACEMENT      and removed     Family History   Problem Relation Age of Onset    Coronary artery disease Mother     Diabetes Mother      Social History     Tobacco Use    Smoking status: Former    Smokeless tobacco: Never   Substance Use Topics    Alcohol use: No    Drug use: No     Review of Systems   Gastrointestinal:  Positive for abdominal pain and constipation.   Neurological:  Positive for light-headedness.   All other systems reviewed and are negative.      Physical Exam     Initial Vitals [12/01/23 1039]   BP Pulse Resp Temp SpO2   (!) 86/51 80 20 98 °F (36.7 °C) 95 %      MAP       --         Physical Exam    Nursing  note and vitals reviewed.  Constitutional: She appears well-developed and well-nourished. She is not diaphoretic. No distress.   HENT:   Head: Normocephalic.   Eyes: Conjunctivae are normal.   Neck: Neck supple.   Normal range of motion.  Cardiovascular:  Normal rate.           Pulmonary/Chest: No respiratory distress.   Abdominal: She exhibits no distension. There is no abdominal tenderness.   Musculoskeletal:         General: No edema.      Cervical back: Normal range of motion and neck supple.     Neurological: She is alert. She has normal strength. GCS eye subscore is 4. GCS verbal subscore is 5. GCS motor subscore is 6.   Skin: Skin is warm and dry.   Psychiatric: She has a normal mood and affect.         ED Course   Procedures  Labs Reviewed   CBC W/ AUTO DIFFERENTIAL - Abnormal; Notable for the following components:       Result Value    MCH 26.5 (*)     MCHC 30.9 (*)     Immature Granulocytes 0.6 (*)     Immature Grans (Abs) 0.06 (*)     All other components within normal limits   COMPREHENSIVE METABOLIC PANEL - Abnormal; Notable for the following components:    CO2 22 (*)     Glucose 146 (*)     BUN 33 (*)     Creatinine 3.3 (*)     ALT 8 (*)     eGFR 13.2 (*)     All other components within normal limits   URINALYSIS, REFLEX TO URINE CULTURE - Abnormal; Notable for the following components:    Appearance, UA Hazy (*)     Protein, UA 1+ (*)     Bilirubin (UA) 1+ (*)     Occult Blood UA Trace (*)     Urobilinogen, UA 2.0-3.0 (*)     Leukocytes, UA 3+ (*)     All other components within normal limits    Narrative:     Specimen Source->Urine   LACTIC ACID, PLASMA - Abnormal; Notable for the following components:    Lactate (Lactic Acid) 2.0 (*)     All other components within normal limits   URINALYSIS MICROSCOPIC - Abnormal; Notable for the following components:    WBC, UA >100 (*)     Hyaline Casts, UA 29 (*)     All other components within normal limits    Narrative:     Specimen Source->Urine   ISTAT  CREATININE - Abnormal; Notable for the following components:    POC Creatinine 4.1 (*)     All other components within normal limits   CULTURE, BLOOD   CULTURE, BLOOD   CULTURE, URINE   B-TYPE NATRIURETIC PEPTIDE   TROPONIN I HIGH SENSITIVITY   PROCALCITONIN   POCT CREATININE     EKG Readings: (Independently Interpreted)   Sinus rhythm.  Ninety-three beats/minute.  Normal axis.  No ST elevation.       Imaging Results              CT Abdomen Pelvis  Without Contrast (Final result)  Result time 12/01/23 13:55:46      Final result by Yudith Maria MD (12/01/23 13:55:46)                   Narrative:    CMS MANDATED QUALITY DATA - CT RADIATION - 436    All CT scans at this facility utilize dose modulation, iterative reconstruction, and/or weight based dosing when appropriate to reduce radiation dose to as low as reasonably achievable.    HISTORY: Abdominal pain    COMPARISON: 2/13/2022    FINDINGS: Noncontrast axial images were obtained. Nonenhanced study is tailored for the detection of urolithiasis, and is insensitive for abnormalities of the solid organs, vasculature and hollow viscera.    CT ABDOMEN: The lung bases are clear. There is coronary artery calcification.  There is a small hiatal hernia.  The liver, spleen and pancreas have a normal noncontrast appearance.  Gallbladder is unremarkable  Adrenal glands are normal  Kidneys: Symmetric in size and contour. There is no hydronephrosis or renal stone. There is stable simple right renal cysts.    Stomach and small bowel are normal.  Colon: Demonstrates moderate wall thickening of the rectum measuring approximately 9 cm in length. Differential includes proctitis versus rectal malignancy and GI consult is recommended. There is colonic diverticulosis.    Aorta: Tortuous with a 4.2 x 3.8 cm infrarenal abdominal aortic aneurysm. The iliac arteries are mildly dilated. There is diffuse vascular calcification. The IVC is normal in caliber.    There is no mesenteric  or retroperitoneal adenopathy.  Abdominal wall: Small fat-containing umbilical hernia. The musculature is normal.    There is no free fluid or free air.  There are mild degenerative changes at L4-5 and L5-S1 without acute osseous abnormality    CT PELVIS: The uterus and ovaries and bladder are normal. There is no pelvic adenopathy.    IMPRESSION: Diffuse circumferential wall thickening of the rectum measuring approximately 9 cm in length. While this may be secondary to infectious or inflammatory process malignancy cannot be excluded and GI consultation is recommended    4.2 x 3.8 cm infrarenal abdominal aortic aneurysm. Follow-up in one year is recommended    Colonic diverticulosis    Stable simple renal cysts    Degenerative changes of the spine    Electronically signed by:  Yudith Maria MD  12/01/2023 01:55 PM CST Workstation: [id number]                                     X-Ray Chest AP Portable (Final result)  Result time 12/01/23 11:50:46      Final result by Yudith Maria MD (12/01/23 11:50:46)                   Narrative:    XR CHEST 1 VIEW    CLINICAL HISTORY:  83 years Female dizziness    COMPARISON: 2/13/2022    FINDINGS: Cardiomediastinal silhouette is within normal limits.  Hypoinflation with stable dilatation of the aortic arch. Lungs are normally expanded with no airspace consolidation. No pleural effusion or pneumothorax. No acute osseous abnormality.    IMPRESSION: No acute pulmonary process.  Stable dilatation of the aortic arch    Electronically signed by:  Yudith Maria MD  12/01/2023 11:50 AM CST Workstation: [id number]                                     Medications   sodium chloride 0.9% bolus 1,000 mL 1,000 mL (1,000 mLs Intravenous New Bag 12/1/23 1353)   sodium chloride 0.9% bolus 1,000 mL 1,000 mL (0 mLs Intravenous Stopped 12/1/23 1315)   cefTRIAXone (ROCEPHIN) 1 g in dextrose 5 % 100 mL IVPB (ready to mix) (1 g Intravenous New Bag 12/1/23 1351)     Medical Decision  Making  83-year-old female presenting after presyncopal episode.  She it was mildly hypotensive with a SBP in the 80s upon arrival.  IV fluid bolus started.  EKG shows no ischemic changes.  H&H is normal.  No leukocytosis.  Lactic acid minimally elevated.  UA does show a UTI.  Creatinine acutely elevated at 3.3.  Patient was given a 2nd 1 L IV fluid bolus.  Blood culture sent and IV Rocephin given.  CT abdomen was obtained and shows some rectal wall thickening.  Upon re-evaluation patient does say that the last 3 days she has felt like she needed have a bowel movement but could not go.  SBP now in the low 100s.  Hospitalist has been consulted for admission.    Amount and/or Complexity of Data Reviewed  Labs: ordered.  Radiology: ordered.    Risk  Decision regarding hospitalization.                                      Clinical Impression:  Final diagnoses:  [R55] Syncope  [R42] Lightheadedness  [N17.9] JAMIE (acute kidney injury) (Primary)  [K62.89] Proctitis          ED Disposition Condition    Admit Stable                [place] wom[place] MD  12/01/23 2992

## 2023-12-01 NOTE — SUBJECTIVE & OBJECTIVE
Past Medical History:   Diagnosis Date    Arthritis     DM2 (diabetes mellitus, type 2)     Glaucoma capsular     HLD (hyperlipidemia)     Hypertension     Kidney stones     Skin lesion of breast        Past Surgical History:   Procedure Laterality Date    BREAST SURGERY      removed lump to right breast     EYE SURGERY      URETERAL STENT PLACEMENT      and removed       Review of patient's allergies indicates:  No Known Allergies    No current facility-administered medications on file prior to encounter.     Current Outpatient Medications on File Prior to Encounter   Medication Sig    ciclopirox 0.77 % Gel Apply topically 2 (two) times daily.    dabigatran etexilate (PRADAXA) 75 mg Cap TAKE 1 CAPSULE(75 MG) BY MOUTH TWICE DAILY    ergocalciferol (ERGOCALCIFEROL) 50,000 unit Cap Take 1,250 mcg by mouth once daily.    ezetimibe (ZETIA) 10 mg tablet Take 10 mg by mouth every evening.    olmesartan-amLODIPin-hcthiazid 40-10-12.5 mg Tab Take 1 tablet by mouth once daily.    alcohol antiseptic pads (ALCOHOL PADS TOP) USE TWICE DAILY PRIOR TO FINGER STICK    alcohol antiseptic pads (ALCOHOL SWABS TOP)     amLODIPine (NORVASC) 10 MG tablet Take 10 mg by mouth once daily.    aspirin (ECOTRIN) 81 MG EC tablet Take 81 mg by mouth once daily.    cloNIDine (CATAPRES) 0.1 MG tablet Take 0.1 mg by mouth 2 (two) times daily.    esomeprazole (NEXIUM) 40 MG capsule Take 1 capsule (40 mg total) by mouth once daily.    traMADoL (ULTRAM) 50 mg tablet Take 50 mg by mouth 3 (three) times daily as needed.    TRAVATAN Z 0.004 % Drop 1 drop 2 (two) times daily.     [DISCONTINUED] doxycycline (VIBRA-TABS) 100 MG tablet Take 1 tablet (100 mg total) by mouth 2 (two) times daily. (Patient not taking: Reported on 5/2/2022)    [DISCONTINUED] simvastatin (ZOCOR) 40 MG tablet Take 40 mg by mouth once daily.     Family History       Problem Relation (Age of Onset)    Coronary artery disease Mother    Diabetes Mother          Tobacco Use    Smoking  status: Former    Smokeless tobacco: Never   Substance and Sexual Activity    Alcohol use: No    Drug use: No    Sexual activity: Not on file     Review of Systems   Constitutional: Negative.  Negative for appetite change, chills, fatigue, fever and unexpected weight change.   HENT: Negative.  Negative for congestion, ear pain, hearing loss, rhinorrhea, sore throat and tinnitus.    Eyes: Negative.  Negative for pain, discharge and redness.   Respiratory: Negative.  Negative for cough, shortness of breath, wheezing and stridor.    Cardiovascular: Negative.  Negative for chest pain, palpitations and leg swelling.   Gastrointestinal:  Positive for abdominal pain, constipation and rectal pain. Negative for abdominal distention, diarrhea, nausea and vomiting.   Endocrine: Negative.  Negative for cold intolerance, heat intolerance, polydipsia, polyphagia and polyuria.   Genitourinary: Negative.  Negative for decreased urine volume, difficulty urinating, flank pain, hematuria and urgency.   Musculoskeletal: Negative.  Negative for arthralgias, gait problem and myalgias.   Skin: Negative.  Negative for pallor and rash.   Allergic/Immunologic: Negative.  Negative for environmental allergies, food allergies and immunocompromised state.   Neurological:  Positive for dizziness (with near syncope). Negative for syncope, facial asymmetry, weakness and headaches.   Hematological: Negative.    Psychiatric/Behavioral: Negative.  Negative for agitation, confusion, self-injury and suicidal ideas.      Objective:     Vital Signs (Most Recent):  Temp: 98.1 °F (36.7 °C) (12/01/23 1430)  Pulse: 92 (12/01/23 1430)  Resp: 18 (12/01/23 1430)  BP: 109/76 (12/01/23 1430)  SpO2: 96 % (12/01/23 1430) Vital Signs (24h Range):  Temp:  [98 °F (36.7 °C)-98.1 °F (36.7 °C)] 98.1 °F (36.7 °C)  Pulse:  [80-95] 92  Resp:  [18-20] 18  SpO2:  [95 %-97 %] 96 %  BP: ()/(51-76) 109/76     Weight: 59 kg (130 lb)  Body mass index is 23.78 kg/m².      Physical Exam  Vitals reviewed.   Constitutional:       General: She is not in acute distress.     Appearance: Normal appearance. She is normal weight. She is not toxic-appearing.   HENT:      Head: Normocephalic and atraumatic.      Nose: Nose normal. No congestion or rhinorrhea.      Mouth/Throat:      Mouth: Mucous membranes are moist.      Pharynx: Oropharynx is clear.   Eyes:      General:         Right eye: No discharge.         Left eye: No discharge.      Extraocular Movements: Extraocular movements intact.      Conjunctiva/sclera: Conjunctivae normal.      Pupils: Pupils are equal, round, and reactive to light.   Cardiovascular:      Rate and Rhythm: Normal rate and regular rhythm.      Pulses: Normal pulses.      Heart sounds: Normal heart sounds.   Pulmonary:      Effort: Pulmonary effort is normal. No respiratory distress.      Breath sounds: Normal breath sounds.   Chest:      Chest wall: No tenderness.   Abdominal:      General: Abdomen is flat. Bowel sounds are normal. There is no distension.      Palpations: Abdomen is soft.      Tenderness: There is no abdominal tenderness. There is no right CVA tenderness, left CVA tenderness or guarding.   Musculoskeletal:         General: Normal range of motion.      Cervical back: Normal range of motion and neck supple.   Skin:     General: Skin is warm and dry.      Findings: No rash.   Neurological:      General: No focal deficit present.      Mental Status: She is alert and oriented to person, place, and time. Mental status is at baseline.      Motor: No weakness.   Psychiatric:         Mood and Affect: Mood normal.              CRANIAL NERVES     CN III, IV, VI   Pupils are equal, round, and reactive to light.       Significant Labs: All pertinent labs within the past 24 hours have been reviewed.  CBC:   Recent Labs   Lab 12/01/23  1123   WBC 9.73   HGB 13.3   HCT 43.1        CMP:   Recent Labs   Lab 12/01/23  1123      K 3.8      CO2  22*   *   BUN 33*   CREATININE 3.3*   CALCIUM 9.7   PROT 7.3   ALBUMIN 3.6   BILITOT 0.4   ALKPHOS 131   AST 13   ALT 8*   ANIONGAP 10     Troponin:   Recent Labs   Lab 12/01/23  1123   TROPONINIHS 14.5     Urine Studies:   Recent Labs   Lab 12/01/23  1241   COLORU Yellow   APPEARANCEUA Hazy*   PHUR 6.0   SPECGRAV 1.020   PROTEINUA 1+*   GLUCUA Negative   KETONESU Negative   BILIRUBINUA 1+*   OCCULTUA Trace*   NITRITE Negative   UROBILINOGEN 2.0-3.0*   LEUKOCYTESUR 3+*   RBCUA 1   WBCUA >100*   BACTERIA Negative   SQUAMEPITHEL 6   HYALINECASTS 29*       Significant Imaging: I have reviewed all pertinent imaging results/findings within the past 24 hours.

## 2023-12-01 NOTE — ASSESSMENT & PLAN NOTE
Colace and miralax   CT of abdomen shows rectal thickening, possible proctitis vs malignancy   GI consulted

## 2023-12-01 NOTE — PHARMACY MED REC
"  Admission Medication History     The home medication history was taken by Nasreen Maurice.    You may go to "Admission" then "Reconcile Home Medications" tabs to review and/or act upon these items.     The home medication list has been updated by the Pharmacy department.   Please read ALL comments highlighted in yellow.   Please address this information as you see fit.    Feel free to contact us if you have any questions or require assistance.      The medications listed below were removed from the home medication list. Please reorder if appropriate:  Patient reports no longer taking the following medication(s):  Simvastatin 40  Doxycycline 100      Medications listed below were obtained from: Patient/family and Analytic software- [employer]  No current facility-administered medications on file prior to encounter.     Current Outpatient Medications on File Prior to Encounter   Medication Sig Dispense Refill    ciclopirox 0.77 % Gel Apply topically 2 (two) times daily.      dabigatran etexilate (PRADAXA) 75 mg Cap TAKE 1 CAPSULE(75 MG) BY MOUTH TWICE DAILY 60 capsule 2    ergocalciferol (ERGOCALCIFEROL) 50,000 unit Cap Take 1,250 mcg by mouth once daily.      ezetimibe (ZETIA) 10 mg tablet Take 10 mg by mouth every evening.      olmesartan-amLODIPin-hcthiazid 40-10-12.5 mg Tab Take 1 tablet by mouth once daily.      alcohol antiseptic pads (ALCOHOL PADS TOP) USE TWICE DAILY PRIOR TO FINGER STICK      alcohol antiseptic pads (ALCOHOL SWABS TOP)       amLODIPine (NORVASC) 10 MG tablet Take 10 mg by mouth once daily.      aspirin (ECOTRIN) 81 MG EC tablet Take 81 mg by mouth once daily.      cloNIDine (CATAPRES) 0.1 MG tablet Take 0.1 mg by mouth 2 (two) times daily.      esomeprazole (NEXIUM) 40 MG capsule Take 1 capsule (40 mg total) by mouth once daily. 30 capsule 2    traMADoL (ULTRAM) 50 mg tablet Take 50 mg by mouth 3 (three) times daily as needed.      TRAVATAN Z 0.004 % Drop 1 drop 2 (two) times daily.       " [DISCONTINUED] doxycycline (VIBRA-TABS) 100 MG tablet Take 1 tablet (100 mg total) by mouth 2 (two) times daily. (Patient not taking: Reported on 5/2/2022) 20 tablet 0    [DISCONTINUED] simvastatin (ZOCOR) 40 MG tablet Take 40 mg by mouth once daily.         Potential issues to be addressed PRIOR TO DISCHARGE  Patient reported not taking the following medications: (Aspirin 81, clonidine 0.1,Tramadol 50,Travatan z 0.004%, Amlodipine 10). These medications remain on the home medication list. Please address accordingly.   Please discuss with the patient barriers to adherence with medication treatment plans  Patient requires education regarding drug therapies     Nasreen Maurice  [id number]                .

## 2023-12-01 NOTE — Clinical Note
Diagnosis: JAMIE (acute kidney injury) [[id number]]   Future Attending Provider: ANGELO WALKER [[id number]]   Place in Observation: [facility] [[id number]]   Admitting Provider:: ANGELO WALKER [[id number]]

## 2023-12-01 NOTE — H&P
[facility] - Emergency Dept  Hospital Medicine  History & Physical    Patient Name: Azul Clifford  MRN: [id number]  Patient Class: OP- Observation  Admission Date: 12/1/2023  Attending Physician: Gavin Farmer MD   Primary Care Provider: Juancho Graff MD         Patient information was obtained from patient, relative(s), and ER records.     Subjective:     Principal Problem:UTI (urinary tract infection)    Chief Complaint:   Chief Complaint   Patient presents with    Near Syncope        HPI: Patient is an 83-year-old female with a past medical history of DM 2, CKD, hypertension, with history of PE 7 months ago on long-term Pradaxa. Patient daughter at bedside during exam. Patient presents to the ED today with complaints of near syncopal episode. Patient reports she was leaving bathroom going into main dining room at [facility] when she suddenly became dizzy and was assisted to sit by 2 employees. Patient reports decreased systolic in 80s during episode and on arrival to ED. Patient was given 2L bolus IV fluids, blood pressure responding well thus far. Patient denies loss of consciousness or fall. Patient also complains of constipation for the last 3 days and mild abdominal discomfort. UA positive for UTI and patient was started on ceftriaxone in ED. CT of abdomen was completed which shows rectal thickening, GI consulted.  Last echo completed 02/2022 with ejection fraction of 60% and decreased dystolic function. Patient labs also reveal JAMIE with creatinine of 3.3, baseline 1.5. Patient does have CKD stage 3 and follows with Nephrology, Dr Mckeon. Patient denies current dialysis at this time. Nephrology consulted.  Patient denies chest pain, shortness for breath, dysuria, nausea or vomiting. Patient reports her dizziness has diminished since arrival to ED. Full code.    Past Medical History:   Diagnosis Date    Arthritis     DM2 (diabetes mellitus, type 2)     Glaucoma capsular     HLD  (hyperlipidemia)     Hypertension     Kidney stones     Skin lesion of breast        Past Surgical History:   Procedure Laterality Date    BREAST SURGERY      removed lump to right breast     EYE SURGERY      URETERAL STENT PLACEMENT      and removed       Review of patient's allergies indicates:  No Known Allergies    No current facility-administered medications on file prior to encounter.     Current Outpatient Medications on File Prior to Encounter   Medication Sig    ciclopirox 0.77 % Gel Apply topically 2 (two) times daily.    dabigatran etexilate (PRADAXA) 75 mg Cap TAKE 1 CAPSULE(75 MG) BY MOUTH TWICE DAILY    ergocalciferol (ERGOCALCIFEROL) 50,000 unit Cap Take 1,250 mcg by mouth once daily.    ezetimibe (ZETIA) 10 mg tablet Take 10 mg by mouth every evening.    olmesartan-amLODIPin-hcthiazid 40-10-12.5 mg Tab Take 1 tablet by mouth once daily.    alcohol antiseptic pads (ALCOHOL PADS TOP) USE TWICE DAILY PRIOR TO FINGER STICK    alcohol antiseptic pads (ALCOHOL SWABS TOP)     amLODIPine (NORVASC) 10 MG tablet Take 10 mg by mouth once daily.    aspirin (ECOTRIN) 81 MG EC tablet Take 81 mg by mouth once daily.    cloNIDine (CATAPRES) 0.1 MG tablet Take 0.1 mg by mouth 2 (two) times daily.    esomeprazole (NEXIUM) 40 MG capsule Take 1 capsule (40 mg total) by mouth once daily.    traMADoL (ULTRAM) 50 mg tablet Take 50 mg by mouth 3 (three) times daily as needed.    TRAVATAN Z 0.004 % Drop 1 drop 2 (two) times daily.     [DISCONTINUED] doxycycline (VIBRA-TABS) 100 MG tablet Take 1 tablet (100 mg total) by mouth 2 (two) times daily. (Patient not taking: Reported on 5/2/2022)    [DISCONTINUED] simvastatin (ZOCOR) 40 MG tablet Take 40 mg by mouth once daily.     Family History       Problem Relation (Age of Onset)    Coronary artery disease Mother    Diabetes Mother          Tobacco Use    Smoking status: Former    Smokeless tobacco: Never   Substance and Sexual Activity    Alcohol use: No    Drug use: No     Sexual activity: Not on file     Review of Systems   Constitutional: Negative.  Negative for appetite change, chills, fatigue, fever and unexpected weight change.   HENT: Negative.  Negative for congestion, ear pain, hearing loss, rhinorrhea, sore throat and tinnitus.    Eyes: Negative.  Negative for pain, discharge and redness.   Respiratory: Negative.  Negative for cough, shortness of breath, wheezing and stridor.    Cardiovascular: Negative.  Negative for chest pain, palpitations and leg swelling.   Gastrointestinal:  Positive for abdominal pain, constipation and rectal pain. Negative for abdominal distention, diarrhea, nausea and vomiting.   Endocrine: Negative.  Negative for cold intolerance, heat intolerance, polydipsia, polyphagia and polyuria.   Genitourinary: Negative.  Negative for decreased urine volume, difficulty urinating, flank pain, hematuria and urgency.   Musculoskeletal: Negative.  Negative for arthralgias, gait problem and myalgias.   Skin: Negative.  Negative for pallor and rash.   Allergic/Immunologic: Negative.  Negative for environmental allergies, food allergies and immunocompromised state.   Neurological:  Positive for dizziness (with near syncope). Negative for syncope, facial asymmetry, weakness and headaches.   Hematological: Negative.    Psychiatric/Behavioral: Negative.  Negative for agitation, confusion, self-injury and suicidal ideas.      Objective:     Vital Signs (Most Recent):  Temp: 98.1 °F (36.7 °C) (12/01/23 1430)  Pulse: 92 (12/01/23 1430)  Resp: 18 (12/01/23 1430)  BP: 109/76 (12/01/23 1430)  SpO2: 96 % (12/01/23 1430) Vital Signs (24h Range):  Temp:  [98 °F (36.7 °C)-98.1 °F (36.7 °C)] 98.1 °F (36.7 °C)  Pulse:  [80-95] 92  Resp:  [18-20] 18  SpO2:  [95 %-97 %] 96 %  BP: ()/(51-76) 109/76     Weight: 59 kg (130 lb)  Body mass index is 23.78 kg/m².     Physical Exam  Vitals reviewed.   Constitutional:       General: She is not in acute distress.     Appearance: Normal  appearance. She is normal weight. She is not toxic-appearing.   HENT:      Head: Normocephalic and atraumatic.      Nose: Nose normal. No congestion or rhinorrhea.      Mouth/Throat:      Mouth: Mucous membranes are moist.      Pharynx: Oropharynx is clear.   Eyes:      General:         Right eye: No discharge.         Left eye: No discharge.      Extraocular Movements: Extraocular movements intact.      Conjunctiva/sclera: Conjunctivae normal.      Pupils: Pupils are equal, round, and reactive to light.   Cardiovascular:      Rate and Rhythm: Normal rate and regular rhythm.      Pulses: Normal pulses.      Heart sounds: Normal heart sounds.   Pulmonary:      Effort: Pulmonary effort is normal. No respiratory distress.      Breath sounds: Normal breath sounds.   Chest:      Chest wall: No tenderness.   Abdominal:      General: Abdomen is flat. Bowel sounds are normal. There is no distension.      Palpations: Abdomen is soft.      Tenderness: There is no abdominal tenderness. There is no right CVA tenderness, left CVA tenderness or guarding.   Musculoskeletal:         General: Normal range of motion.      Cervical back: Normal range of motion and neck supple.   Skin:     General: Skin is warm and dry.      Findings: No rash.   Neurological:      General: No focal deficit present.      Mental Status: She is alert and oriented to person, place, and time. Mental status is at baseline.      Motor: No weakness.   Psychiatric:         Mood and Affect: Mood normal.              CRANIAL NERVES     CN III, IV, VI   Pupils are equal, round, and reactive to light.       Significant Labs: All pertinent labs within the past 24 hours have been reviewed.  CBC:   Recent Labs   Lab 12/01/23  1123   WBC 9.73   HGB 13.3   HCT 43.1        CMP:   Recent Labs   Lab 12/01/23  1123      K 3.8      CO2 22*   *   BUN 33*   CREATININE 3.3*   CALCIUM 9.7   PROT 7.3   ALBUMIN 3.6   BILITOT 0.4   ALKPHOS 131   AST 13    ALT 8*   ANIONGAP 10     Troponin:   Recent Labs   Lab 12/01/23  1123   TROPONINIHS 14.5     Urine Studies:   Recent Labs   Lab 12/01/23  1241   COLORU Yellow   APPEARANCEUA Hazy*   PHUR 6.0   SPECGRAV 1.020   PROTEINUA 1+*   GLUCUA Negative   KETONESU Negative   BILIRUBINUA 1+*   OCCULTUA Trace*   NITRITE Negative   UROBILINOGEN 2.0-3.0*   LEUKOCYTESUR 3+*   RBCUA 1   WBCUA >100*   BACTERIA Negative   SQUAMEPITHEL 6   HYALINECASTS 29*       Significant Imaging: I have reviewed all pertinent imaging results/findings within the past 24 hours.  Assessment/Plan:     * UTI (urinary tract infection)  Complaints of decreased urine output-dehydration and mild lower abdominal pain  Ceftriaxone IV  Culture pending   Trend infection labs       JAMIE (acute kidney injury)  Chronic Kidney disease with acute kidney injury likely due to UTI/dehydration.   Baseline creatinine  1.5  - Labs reviewed  Renal function/electrolytes with Estimated Creatinine Clearance: 9.9 mL/min (A) (based on SCr of 3.3 mg/dL (H)).   Monitor urine output and serial BMP and adjust therapy as needed.   Avoid nephrotoxins and renally dose meds for GFR listed above.  Nephrology consulted       Near syncope  Pt with hypotension, IV fluids given in ED and responding well. Will monitor and start vasopressors if necessary   CT of head ordered   Carotid ultrasound ordered   Updated echo pending   Orthostatics pending   Neuro checks   Fall precautions   PT/OT consulted       Constipation  Colace and miralax   CT of abdomen shows rectal thickening, possible proctitis vs malignancy   GI consulted       Primary hypertension  Will hold regimen for now due to hypotension  Monitor vitals   Restart if and when able     Type 2 diabetes mellitus, without long-term current use of insulin  POCT glucose   Insulin sliding scale  Diabetic diet       VTE Risk Mitigation (From admission, onward)           Ordered     IP VTE HIGH RISK PATIENT  Once         12/01/23 1443     Place  sequential compression device  Until discontinued         12/01/23 1443     Reason for No Pharmacological VTE Prophylaxis  Once        Question:  Reasons:  Answer:  Already adequately anticoagulated on oral Anticoagulants    12/01/23 1443                  Discussed and reviewed patient case with supervising physician Gavin Farmer MD who agrees with current plan of care.      DEB Meeks  Department of Hospital Medicine  [facility] - Emergency Dept

## 2023-12-02 PROBLEM — K62.9 RECTAL ABNORMALITY: Status: ACTIVE | Noted: 2023-12-02

## 2023-12-02 LAB
ALBUMIN SERPL BCP-MCNC: 3.4 G/DL (ref 3.5–5.2)
ALP SERPL-CCNC: 128 U/L (ref 55–135)
ALT SERPL W/O P-5'-P-CCNC: 8 U/L (ref 10–44)
ANION GAP SERPL CALC-SCNC: 11 MMOL/L (ref 8–16)
AST SERPL-CCNC: 13 U/L (ref 10–40)
BASOPHILS # BLD AUTO: 0.1 K/UL (ref 0–0.2)
BASOPHILS NFR BLD: 1.1 % (ref 0–1.9)
BILIRUB SERPL-MCNC: 0.3 MG/DL (ref 0.1–1)
BUN SERPL-MCNC: 34 MG/DL (ref 8–23)
CALCIUM SERPL-MCNC: 9 MG/DL (ref 8.7–10.5)
CHLORIDE SERPL-SCNC: 110 MMOL/L (ref 95–110)
CO2 SERPL-SCNC: 20 MMOL/L (ref 23–29)
CREAT SERPL-MCNC: 2.2 MG/DL (ref 0.5–1.4)
CRP SERPL-MCNC: 15.5 MG/L (ref 0–8.2)
DIFFERENTIAL METHOD: ABNORMAL
EOSINOPHIL # BLD AUTO: 0.2 K/UL (ref 0–0.5)
EOSINOPHIL NFR BLD: 1.7 % (ref 0–8)
ERYTHROCYTE [DISTWIDTH] IN BLOOD BY AUTOMATED COUNT: 14.1 % (ref 11.5–14.5)
EST. GFR  (NO RACE VARIABLE): 21.4 ML/MIN/1.73 M^2
ESTIMATED AVG GLUCOSE: 137 MG/DL (ref 68–131)
GLUCOSE SERPL-MCNC: 115 MG/DL (ref 70–110)
GLUCOSE SERPL-MCNC: 65 MG/DL (ref 70–110)
GLUCOSE SERPL-MCNC: 67 MG/DL (ref 70–110)
GLUCOSE SERPL-MCNC: 67 MG/DL (ref 70–110)
GLUCOSE SERPL-MCNC: 69 MG/DL (ref 70–110)
GLUCOSE SERPL-MCNC: 70 MG/DL (ref 70–110)
GLUCOSE SERPL-MCNC: 75 MG/DL (ref 70–110)
GLUCOSE SERPL-MCNC: 86 MG/DL (ref 70–110)
GLUCOSE SERPL-MCNC: 90 MG/DL (ref 70–110)
GLUCOSE SERPL-MCNC: 96 MG/DL (ref 70–110)
HBA1C MFR BLD: 6.4 % (ref 4.5–6.2)
HCT VFR BLD AUTO: 39 % (ref 37–48.5)
HGB BLD-MCNC: 11.9 G/DL (ref 12–16)
IMM GRANULOCYTES # BLD AUTO: 0.04 K/UL (ref 0–0.04)
IMM GRANULOCYTES NFR BLD AUTO: 0.5 % (ref 0–0.5)
LACTATE SERPL-SCNC: 2.1 MMOL/L (ref 0.5–1.9)
LYMPHOCYTES # BLD AUTO: 1.8 K/UL (ref 1–4.8)
LYMPHOCYTES NFR BLD: 20.4 % (ref 18–48)
MAGNESIUM SERPL-MCNC: 1.9 MG/DL (ref 1.6–2.6)
MCH RBC QN AUTO: 26.5 PG (ref 27–31)
MCHC RBC AUTO-ENTMCNC: 30.5 G/DL (ref 32–36)
MCV RBC AUTO: 87 FL (ref 82–98)
MONOCYTES # BLD AUTO: 0.7 K/UL (ref 0.3–1)
MONOCYTES NFR BLD: 7.8 % (ref 4–15)
NEUTROPHILS # BLD AUTO: 6.1 K/UL (ref 1.8–7.7)
NEUTROPHILS NFR BLD: 68.5 % (ref 38–73)
NRBC BLD-RTO: 0 /100 WBC
PHOSPHATE SERPL-MCNC: 2.9 MG/DL (ref 2.7–4.5)
PLATELET # BLD AUTO: 263 K/UL (ref 150–450)
PMV BLD AUTO: 9.8 FL (ref 9.2–12.9)
POTASSIUM SERPL-SCNC: 3.2 MMOL/L (ref 3.5–5.1)
PROCALCITONIN SERPL IA-MCNC: 0.18 NG/ML (ref 0–0.5)
PROT SERPL-MCNC: 6.7 G/DL (ref 6–8.4)
RBC # BLD AUTO: 4.49 M/UL (ref 4–5.4)
SODIUM SERPL-SCNC: 141 MMOL/L (ref 136–145)
WBC # BLD AUTO: 8.83 K/UL (ref 3.9–12.7)

## 2023-12-02 PROCEDURE — 84145 PROCALCITONIN (PCT): CPT | Performed by: PHYSICAL THERAPY ASSISTANT

## 2023-12-02 PROCEDURE — 83735 ASSAY OF MAGNESIUM: CPT | Performed by: PHYSICAL THERAPY ASSISTANT

## 2023-12-02 PROCEDURE — 97161 PT EVAL LOW COMPLEX 20 MIN: CPT

## 2023-12-02 PROCEDURE — 80053 COMPREHEN METABOLIC PANEL: CPT | Performed by: PHYSICAL THERAPY ASSISTANT

## 2023-12-02 PROCEDURE — 96375 TX/PRO/DX INJ NEW DRUG ADDON: CPT

## 2023-12-02 PROCEDURE — 85025 COMPLETE CBC W/AUTO DIFF WBC: CPT | Performed by: PHYSICAL THERAPY ASSISTANT

## 2023-12-02 PROCEDURE — 82962 GLUCOSE BLOOD TEST: CPT | Mod: 91

## 2023-12-02 PROCEDURE — 25000003 PHARM REV CODE 250: Performed by: HOSPITALIST

## 2023-12-02 PROCEDURE — G0378 HOSPITAL OBSERVATION PER HR: HCPCS

## 2023-12-02 PROCEDURE — C9113 INJ PANTOPRAZOLE SODIUM, VIA: HCPCS | Performed by: PHYSICAL THERAPY ASSISTANT

## 2023-12-02 PROCEDURE — 63600175 PHARM REV CODE 636 W HCPCS: Performed by: PHYSICAL THERAPY ASSISTANT

## 2023-12-02 PROCEDURE — 96372 THER/PROPH/DIAG INJ SC/IM: CPT | Performed by: PHYSICAL THERAPY ASSISTANT

## 2023-12-02 PROCEDURE — 25000003 PHARM REV CODE 250: Performed by: NURSE PRACTITIONER

## 2023-12-02 PROCEDURE — 83605 ASSAY OF LACTIC ACID: CPT | Performed by: PHYSICAL THERAPY ASSISTANT

## 2023-12-02 PROCEDURE — 36415 COLL VENOUS BLD VENIPUNCTURE: CPT | Performed by: PHYSICAL THERAPY ASSISTANT

## 2023-12-02 PROCEDURE — 25000003 PHARM REV CODE 250: Performed by: PHYSICAL THERAPY ASSISTANT

## 2023-12-02 PROCEDURE — 84100 ASSAY OF PHOSPHORUS: CPT | Performed by: PHYSICAL THERAPY ASSISTANT

## 2023-12-02 PROCEDURE — 96361 HYDRATE IV INFUSION ADD-ON: CPT

## 2023-12-02 PROCEDURE — 83036 HEMOGLOBIN GLYCOSYLATED A1C: CPT | Performed by: PHYSICAL THERAPY ASSISTANT

## 2023-12-02 RX ORDER — POTASSIUM CHLORIDE 20 MEQ/1
40 TABLET, EXTENDED RELEASE ORAL ONCE
Status: COMPLETED | OUTPATIENT
Start: 2023-12-02 | End: 2023-12-02

## 2023-12-02 RX ORDER — CLONIDINE HYDROCHLORIDE 0.1 MG/1
0.1 TABLET ORAL 2 TIMES DAILY
Status: DISCONTINUED | OUTPATIENT
Start: 2023-12-02 | End: 2023-12-03 | Stop reason: HOSPADM

## 2023-12-02 RX ADMIN — EZETIMIBE 10 MG: 10 TABLET ORAL at 09:12

## 2023-12-02 RX ADMIN — CEFTRIAXONE SODIUM 1 G: 1 INJECTION, POWDER, FOR SOLUTION INTRAMUSCULAR; INTRAVENOUS at 01:12

## 2023-12-02 RX ADMIN — SODIUM CHLORIDE 500 ML: 0.9 INJECTION, SOLUTION INTRAVENOUS at 08:12

## 2023-12-02 RX ADMIN — Medication 16 G: at 08:12

## 2023-12-02 RX ADMIN — POTASSIUM CHLORIDE 40 MEQ: 1500 TABLET, EXTENDED RELEASE ORAL at 01:12

## 2023-12-02 RX ADMIN — CLONIDINE HYDROCHLORIDE 0.1 MG: 0.1 TABLET ORAL at 09:12

## 2023-12-02 RX ADMIN — Medication 16 G: at 05:12

## 2023-12-02 RX ADMIN — SODIUM CHLORIDE: 0.9 INJECTION, SOLUTION INTRAVENOUS at 12:12

## 2023-12-02 RX ADMIN — PANTOPRAZOLE SODIUM 40 MG: 40 INJECTION, POWDER, FOR SOLUTION INTRAVENOUS at 08:12

## 2023-12-02 RX ADMIN — Medication 16 G: at 06:12

## 2023-12-02 RX ADMIN — DOCUSATE SODIUM 100 MG: 100 CAPSULE, LIQUID FILLED ORAL at 08:12

## 2023-12-02 RX ADMIN — Medication 6 MG: at 09:12

## 2023-12-02 RX ADMIN — ENOXAPARIN SODIUM 60 MG: 60 INJECTION SUBCUTANEOUS at 10:12

## 2023-12-02 RX ADMIN — POTASSIUM CHLORIDE 40 MEQ: 1500 TABLET, EXTENDED RELEASE ORAL at 06:12

## 2023-12-02 NOTE — HOSPITAL COURSE
83yF who presents for evaluation of near syncope and dizziness.  Chest x-ray with no acute processes, CTA negative for acute intracranial abnormalities, CT abdomen pelvis showed diffuse circumferential wall thickening of the rectum.  She was also found to have abnormal urinalysis and elevated lactic of 2.1 baseline around 1.5.  Patient started on IV antibiotics, administered cautious IV fluids, and consult placed for GI and Nephrology. Nephrology evaluated the patient noting acute on chronic CKD and UTI with lactic acidosis. 2D echo showed EF of 64% with normal diastolic function. Responded well to antibiotic therapy, Lactic normalized, urine culture NGTD. Ambulatory referral to GI for evaluation of Abnormal rectal wall thickening. No problem with BM - voiding well, denies pain, Hgb stable. Creatine normalized. For discharge will continue clonidine but discontinue benicar/HCTZ. Kelflex UTI.

## 2023-12-02 NOTE — PROGRESS NOTES
Critical access hospital Medicine  Progress Note    Patient Name: Azul Clifford  MRN: [id number]  Patient Class: OP- Observation   Admission Date: 12/1/2023  Length of Stay: 0 days  Attending Physician: Nhung Strong MD  Primary Care Provider: Juancho Graff MD        Subjective:     Principal Problem:UTI (urinary tract infection)        HPI:  Patient is an 83-year-old female with a past medical history of DM 2, CKD, hypertension, with history of PE 7 months ago on long-term Pradaxa. Patient daughter at bedside during exam. Patient presents to the ED today with complaints of near syncopal episode. Patient reports she was leaving bathroom going into main dining room at [facility] center when she suddenly became dizzy and was assisted to sit by 2 employees. Patient reports decreased systolic in 80s during episode and on arrival to ED. Patient was given 2L bolus IV fluids, blood pressure responding well thus far. Patient denies loss of consciousness or fall. Patient also complains of constipation for the last 3 days and mild abdominal discomfort. UA positive for UTI and patient was started on ceftriaxone in ED. CT of abdomen was completed which shows rectal thickening, GI consulted.  Last echo completed 02/2022 with ejection fraction of 60% and decreased dystolic function. Patient labs also reveal JAMIE with creatinine of 3.3, baseline 1.5. Patient does have CKD stage 3 and follows with Nephrology, Dr Mckeon. Patient denies current dialysis at this time. Nephrology consulted.  Patient denies chest pain, shortness for breath, dysuria, nausea or vomiting. Patient reports her dizziness has diminished since arrival to ED. Full code.    Overview/Hospital Course:  83yF who presents for evaluation of near syncope and dizziness.  Chest x-ray with no acute processes, CTA negative for acute intracranial abnormalities, CT abdomen pelvis showed diffuse circumferential wall thickening of the rectum.  She was also found to  have abnormal urinalysis and elevated lactic of 2.1 baseline around 1.5.  Patient started on IV antibiotics, administered cautious IV fluids, and consult placed for GI and Nephrology. Nephrology evaluated the patient noting acute on chronic CKD and UTI with lactic acidosis. 2D echo showed EF of 64% with normal diastolic function.    Follow lactic acid, awaiting GI opinion regarding rectal wall thickening, oral supplementation for electrolyte imbalances, restart home clonidine, hold Benicar/HCTZ.  Follow urine culture, PT OT    Past Medical History:   Diagnosis Date    Arthritis     DM2 (diabetes mellitus, type 2)     Glaucoma capsular     HLD (hyperlipidemia)     Hypertension     Kidney stones     Skin lesion of breast        Past Surgical History:   Procedure Laterality Date    BREAST SURGERY      removed lump to right breast     EYE SURGERY      URETERAL STENT PLACEMENT      and removed       Review of patient's allergies indicates:  No Known Allergies    No current facility-administered medications on file prior to encounter.     Current Outpatient Medications on File Prior to Encounter   Medication Sig    ciclopirox 0.77 % Gel Apply topically 2 (two) times daily.    dabigatran etexilate (PRADAXA) 75 mg Cap TAKE 1 CAPSULE(75 MG) BY MOUTH TWICE DAILY    ergocalciferol (ERGOCALCIFEROL) 50,000 unit Cap Take 1,250 mcg by mouth once daily.    ezetimibe (ZETIA) 10 mg tablet Take 10 mg by mouth every evening.    olmesartan-amLODIPin-hcthiazid 40-10-12.5 mg Tab Take 1 tablet by mouth once daily.    alcohol antiseptic pads (ALCOHOL PADS TOP) USE TWICE DAILY PRIOR TO FINGER STICK    alcohol antiseptic pads (ALCOHOL SWABS TOP)     amLODIPine (NORVASC) 10 MG tablet Take 10 mg by mouth once daily.    aspirin (ECOTRIN) 81 MG EC tablet Take 81 mg by mouth once daily.    cloNIDine (CATAPRES) 0.1 MG tablet Take 0.1 mg by mouth 2 (two) times daily.    esomeprazole (NEXIUM) 40 MG capsule Take 1 capsule (40 mg total) by mouth once  daily.    traMADoL (ULTRAM) 50 mg tablet Take 50 mg by mouth 3 (three) times daily as needed.    TRAVATAN Z 0.004 % Drop 1 drop 2 (two) times daily.      Family History       Problem Relation (Age of Onset)    Coronary artery disease Mother    Diabetes Mother          Tobacco Use    Smoking status: Former    Smokeless tobacco: Never   Substance and Sexual Activity    Alcohol use: No    Drug use: No    Sexual activity: Not on file     Review of Systems   Constitutional: Negative.  Negative for appetite change, chills, fatigue, fever and unexpected weight change.   HENT: Negative.  Negative for congestion, ear pain, hearing loss, rhinorrhea, sore throat and tinnitus.    Eyes: Negative.  Negative for pain, discharge and redness.   Respiratory: Negative.  Negative for cough, shortness of breath, wheezing and stridor.    Cardiovascular: Negative.  Negative for chest pain, palpitations and leg swelling.   Gastrointestinal:  Positive for abdominal pain, constipation and rectal pain. Negative for abdominal distention, diarrhea, nausea and vomiting.   Endocrine: Negative.  Negative for cold intolerance, heat intolerance, polydipsia, polyphagia and polyuria.   Genitourinary: Negative.  Negative for decreased urine volume, difficulty urinating, flank pain, hematuria and urgency.   Musculoskeletal: Negative.  Negative for arthralgias, gait problem and myalgias.   Skin: Negative.  Negative for pallor and rash.   Allergic/Immunologic: Negative.  Negative for environmental allergies, food allergies and immunocompromised state.   Neurological:  Positive for dizziness (with near syncope). Negative for syncope, facial asymmetry, weakness and headaches.   Hematological: Negative.    Psychiatric/Behavioral: Negative.  Negative for agitation, confusion, self-injury and suicidal ideas.      Objective:     Vital Signs (Most Recent):  Temp: 98.8 °F (37.1 °C) (12/02/23 1100)  Pulse: 92 (12/02/23 1100)  Resp: 16 (12/02/23 1100)  BP: (!)  157/71 (12/02/23 1100)  SpO2: 97 % (12/02/23 1100) Vital Signs (24h Range):  Temp:  [97.5 °F (36.4 °C)-98.8 °F (37.1 °C)] 98.8 °F (37.1 °C)  Pulse:  [] 92  Resp:  [16-32] 16  SpO2:  [94 %-100 %] 97 %  BP: (107-169)/(63-88) 157/71     Weight: 65.8 kg (145 lb)  Body mass index is 26.52 kg/m².     Physical Exam  Vitals reviewed.   Constitutional:       General: She is not in acute distress.     Appearance: Normal appearance. She is normal weight. She is not toxic-appearing.   HENT:      Head: Normocephalic and atraumatic.      Nose: Nose normal. No congestion or rhinorrhea.      Mouth/Throat:      Mouth: Mucous membranes are moist.      Pharynx: Oropharynx is clear.   Eyes:      General:         Right eye: No discharge.         Left eye: No discharge.      Extraocular Movements: Extraocular movements intact.      Conjunctiva/sclera: Conjunctivae normal.      Pupils: Pupils are equal, round, and reactive to light.   Cardiovascular:      Rate and Rhythm: Normal rate and regular rhythm.      Pulses: Normal pulses.      Heart sounds: Normal heart sounds.   Pulmonary:      Effort: Pulmonary effort is normal. No respiratory distress.      Breath sounds: Normal breath sounds.   Chest:      Chest wall: No tenderness.   Abdominal:      General: Abdomen is flat. Bowel sounds are normal. There is no distension.      Palpations: Abdomen is soft.      Tenderness: There is no abdominal tenderness. There is no right CVA tenderness, left CVA tenderness or guarding.   Musculoskeletal:         General: Normal range of motion.      Cervical back: Normal range of motion and neck supple.   Skin:     General: Skin is warm and dry.      Findings: No rash.   Neurological:      General: No focal deficit present.      Mental Status: She is alert and oriented to person, place, and time. Mental status is at baseline.      Motor: No weakness.   Psychiatric:         Mood and Affect: Mood normal.              CRANIAL NERVES     CN III, IV, VI    Pupils are equal, round, and reactive to light.       Significant Labs: All pertinent labs within the past 24 hours have been reviewed.  CBC:   Recent Labs   Lab 12/01/23  1123 12/02/23  0356   WBC 9.73 8.83   HGB 13.3 11.9*   HCT 43.1 39.0    263       CMP:   Recent Labs   Lab 12/01/23  1123 12/02/23  0356    141   K 3.8 3.2*    110   CO2 22* 20*   * 96   BUN 33* 34*   CREATININE 3.3* 2.2*   CALCIUM 9.7 9.0   PROT 7.3 6.7   ALBUMIN 3.6 3.4*   BILITOT 0.4 0.3   ALKPHOS 131 128   AST 13 13   ALT 8* 8*   ANIONGAP 10 11       Troponin:   Recent Labs   Lab 12/01/23  1123   TROPONINIHS 14.5       Urine Studies:   Recent Labs   Lab 12/01/23  1241   COLORU Yellow   APPEARANCEUA Hazy*   PHUR 6.0   SPECGRAV 1.020   PROTEINUA 1+*   GLUCUA Negative   KETONESU Negative   BILIRUBINUA 1+*   OCCULTUA Trace*   NITRITE Negative   UROBILINOGEN 2.0-3.0*   LEUKOCYTESUR 3+*   RBCUA 1   WBCUA >100*   BACTERIA Negative   SQUAMEPITHEL 6   HYALINECASTS 29*         Significant Imaging:   Imaging Results              US Carotid Bilateral (Final result)  Result time 12/01/23 16:23:19      Final result by Rodney Estrada MD (12/01/23 16:23:19)                   Narrative:    CMS MANDATED QUALITY DATA - CAROTID - 195    All measurements and percent stenosis described below were determined using NASCET criteria or criteria similar to NASCET, as defined by the Society of Radiologists in Ultrasound Consensus Conference, Radiology, 2003    US CAROTID DOPPLER BILATERAL    CLINICAL HISTORY:  83 years Female near syncope    COMPARISON: Carotid sonogram February 13, 2022    FINDINGS: Grayscale, color Doppler and  spectral Doppler evaluation of the carotid arteries was performed. Mild atherosclerotic plaque visualized at the carotid bulbs bilaterally.    Peak systolic velocity in the right CCA is 101.2 cm/sec, and peak systolic velocity in the right ICA is 80.1 cm/sec, with ICA/CCA ratio of less than 2.    Peak systolic  velocity in the left CCA is 72.9 cm/sec, and peak systolic velocity in the left ICA is 62.5 cm/sec, with ICA/CCA ratio of less than 2.    The vertebral arteries are patent, with normal waveforms, and normal antegrade flow bilaterally.    IMPRESSION:    1. No Doppler evidence of carotid artery occlusion or hemodynamically significant stenosis.  2. Patent vertebral arteries with antegrade flow bilaterally.    Electronically signed by:  Rodney Estrada MD  12/01/2023 04:23 PM CST Workstation: [id number]                                     CT Head Without Contrast (Final result)  Result time 12/01/23 15:38:39      Final result by Yudith Maria MD (12/01/23 15:38:39)                   Narrative:    CMS MANDATED QUALITY DATA - CT RADIATION  436    All CT scans at this facility utilize dose modulation, iterative reconstruction, and/or weight based dosing when appropriate to reduce radiation dose to as low as reasonably achievable.  CT head without contrast    Clinical data: Dizziness    COMPARISON: 2/13/2022    FINDINGS: Noninfusion images were obtained from the skull base to the vertex. There is no intracranial mass, hemorrhage, or midline shift. Ventricles and sulci are mildly prominent. There are no pathologic extra-axial fluid collections. There is no evidence of cortical ischemic change. Changes of mild chronic microvascular white matter disease are noted. Cerebellum and brainstem are normal. There is vascular calcification in the cavernous portions of the internal carotid  arteries and distal vertebral arteries. The orbits are unremarkable. The calvarium is intact.    IMPRESSION:    1. No acute intracranial abnormalities. Chronic findings as discussed above.  Moderate vascular calcification in the cavernous carotid arteries and distal vertebral arteries    Electronically signed by:  Yudith Maria MD  12/01/2023 03:38 PM CST Workstation: [id number]                                     CT Abdomen Pelvis   Without Contrast (Final result)  Result time 12/01/23 13:55:46      Final result by Yudith Maria MD (12/01/23 13:55:46)                   Narrative:    CMS MANDATED QUALITY DATA - CT RADIATION - 436    All CT scans at this facility utilize dose modulation, iterative reconstruction, and/or weight based dosing when appropriate to reduce radiation dose to as low as reasonably achievable.    HISTORY: Abdominal pain    COMPARISON: 2/13/2022    FINDINGS: Noncontrast axial images were obtained. Nonenhanced study is tailored for the detection of urolithiasis, and is insensitive for abnormalities of the solid organs, vasculature and hollow viscera.    CT ABDOMEN: The lung bases are clear. There is coronary artery calcification.  There is a small hiatal hernia.  The liver, spleen and pancreas have a normal noncontrast appearance.  Gallbladder is unremarkable  Adrenal glands are normal  Kidneys: Symmetric in size and contour. There is no hydronephrosis or renal stone. There is stable simple right renal cysts.    Stomach and small bowel are normal.  Colon: Demonstrates moderate wall thickening of the rectum measuring approximately 9 cm in length. Differential includes proctitis versus rectal malignancy and GI consult is recommended. There is colonic diverticulosis.    Aorta: Tortuous with a 4.2 x 3.8 cm infrarenal abdominal aortic aneurysm. The iliac arteries are mildly dilated. There is diffuse vascular calcification. The IVC is normal in caliber.    There is no mesenteric or retroperitoneal adenopathy.  Abdominal wall: Small fat-containing umbilical hernia. The musculature is normal.    There is no free fluid or free air.  There are mild degenerative changes at L4-5 and L5-S1 without acute osseous abnormality    CT PELVIS: The uterus and ovaries and bladder are normal. There is no pelvic adenopathy.    IMPRESSION: Diffuse circumferential wall thickening of the rectum measuring approximately 9 cm in length. While this  may be secondary to infectious or inflammatory process malignancy cannot be excluded and GI consultation is recommended    4.2 x 3.8 cm infrarenal abdominal aortic aneurysm. Follow-up in one year is recommended    Colonic diverticulosis    Stable simple renal cysts    Degenerative changes of the spine    Electronically signed by:  Yudith Maria MD  12/01/2023 01:55 PM CST Workstation: [id number]                                     X-Ray Chest AP Portable (Final result)  Result time 12/01/23 11:50:46      Final result by Yudith Maria MD (12/01/23 11:50:46)                   Narrative:    XR CHEST 1 VIEW    CLINICAL HISTORY:  83 years Female dizziness    COMPARISON: 2/13/2022    FINDINGS: Cardiomediastinal silhouette is within normal limits.  Hypoinflation with stable dilatation of the aortic arch. Lungs are normally expanded with no airspace consolidation. No pleural effusion or pneumothorax. No acute osseous abnormality.    IMPRESSION: No acute pulmonary process.  Stable dilatation of the aortic arch    Electronically signed by:  Yudith Maria MD  12/01/2023 11:50 AM CST Workstation: [id number]                                      Assessment/Plan:      * UTI (urinary tract infection)  Complaints of decreased urine output-dehydration and mild lower abdominal pain  Ceftriaxone IV  Culture still pending   Trend infection labs  - lactic elevated      JAMIE (acute kidney injury)  Chronic Kidney disease with acute kidney injury likely due to UTI/dehydration.   Baseline creatinine  1.5  - Labs reviewed  Renal function/electrolytes with Estimated Creatinine Clearance: 16.6 mL/min (A) (based on SCr of 2.2 mg/dL (H)).   Monitor urine output and serial BMP and adjust therapy as needed.   Avoid nephrotoxins and renally dose meds for GFR listed above.  Nephrology following holding Benicar/HCTZ  Follow chemistry      Near syncope  Pt with hypotension, IV fluids given in ED and responding well.  Suspect infection  CT  of head negative for acute findings  Carotid ultrasound ordered   Updated echo showed no evidence of carotid artery occlusion or significant hemodynamic instability   Patient is not orthostatic  Neuro checks   Fall precautions   PT/OT following      Rectal abnormality  CT abdomen and pelvis shows diffuse circumferential wall thickening of the rectum measuring 9 cm  Related to constipation???  GI consulted awaiting opinion      Constipation  Colace and miralax   CT of abdomen shows rectal thickening, possible proctitis vs malignancy   GI consulted       Primary hypertension  Will hold regimen for now due to hypotension  Monitor vitals   Restart if and when able     Type 2 diabetes mellitus, without long-term current use of insulin  POCT glucose   Insulin sliding scale  Diabetic diet       VTE Risk Mitigation (From admission, onward)           Ordered     enoxaparin injection 60 mg  Every 24 hours         12/01/23 1717     IP VTE HIGH RISK PATIENT  Once         12/01/23 1443     Place sequential compression device  Until discontinued         12/01/23 1443     Reason for No Pharmacological VTE Prophylaxis  Once        Question:  Reasons:  Answer:  Already adequately anticoagulated on oral Anticoagulants    12/01/23 1443                    Discharge Planning   MANOJ:      Code Status: Full Code   Is the patient medically ready for discharge?:     Reason for patient still in hospital (select all that apply): Patient trending condition and Treatment                     JOSE Nair  Department of Hospital Medicine   [facility]

## 2023-12-02 NOTE — ASSESSMENT & PLAN NOTE
CT abdomen and pelvis shows diffuse circumferential wall thickening of the rectum measuring 9 cm  Related to constipation???  GI consulted awaiting opinion

## 2023-12-02 NOTE — DISCHARGE INSTRUCTIONS
"CT abdomen and pelvis showed the below results.  Follow-up with primary care for continued evaluation and surveillance as recommended by Radiology.  "4.2 x 3.8 cm infrarenal abdominal aortic aneurysm. Follow-up in one year is recommended "    Follow up with GI in clinic  Abnormal rectal wall thickening      Stop taking amlodipine 10 mg  Stop taking olmesartan/amlodipine/HCTZ 40 1012.5  Complete medications as ordered  Follow all discharge instructions.  Please schedule follow up appointments as necessary      When to Call Your Doctor  Call your doctor immediately if you have any of the following:  Severe headache  Severe dizziness, or fainting  Nausea or vomiting  Fast heartbeat (higher than 100 beats per minute)  Fever or chills  Swollen ankles  Weakness  Chest Pain attacks that last longer, occur more often, or are more severe than in the past       "

## 2023-12-02 NOTE — ASSESSMENT & PLAN NOTE
Chronic Kidney disease with acute kidney injury likely due to UTI/dehydration.   Baseline creatinine  1.5  - Labs reviewed  Renal function/electrolytes with Estimated Creatinine Clearance: 16.6 mL/min (A) (based on SCr of 2.2 mg/dL (H)).   Monitor urine output and serial BMP and adjust therapy as needed.   Avoid nephrotoxins and renally dose meds for GFR listed above.  Nephrology following holding Benicar/HCTZ  Follow chemistry

## 2023-12-02 NOTE — PT/OT/SLP EVAL
"Physical Therapy Evaluation    Patient Name:  Azul Clifford   MRN:  [id number]    Recommendations:     Discharge Recommendations: Low Intensity Therapy (vs no therapy depending on progress)   Discharge Equipment Recommendations: walker, rolling   Barriers to discharge: Decreased caregiver support    Assessment:     Azul Clifford is a 83 y.o. female admitted with a medical diagnosis of UTI (urinary tract infection).  She presents with the following impairments/functional limitations: impaired endurance, impaired functional mobility, gait instability, impaired balance, decreased safety awareness, pain.    Pt found HOB elevated and agreeable to working with PT. Pt A & O x  3 and has the following co-morbidities: DM, HTN, JAMIE, AAA.  Pt tolerated session fairly well and required minimal A > CGA for safe mobilization during session today. Pt would benefit from acute PT during hospitalization to increase strength, endurance and safety with mobility and would benefit from use of a rollator/RW and low intensity therapy > no therapy depending on progress prior to discharge home.      Rehab Prognosis: Fair; patient would benefit from acute skilled PT services to address these deficits and reach maximum level of function.    Recent Surgery: * No surgery found *      Plan:     During this hospitalization, patient to be seen daily to address the identified rehab impairments via gait training, therapeutic activities, therapeutic exercises and progress toward the following goals:    Plan of Care Expires:  01/02/24    Subjective     Chief Complaint: discomfort "down there front & back" due to UTI & constipation  Patient/Family Comments/goals: home on discharge  Pain/Comfort:  Pain Rating 1:  (not rated)  Location - Orientation 1: lower  Location 1: abdomen  Pain Addressed 1: Reposition, Distraction, Cessation of Activity, Nurse notified  Pain Rating 2:  (not rated)  Location - Orientation 2: lower  Location 2: back  Pain Addressed 2: " Reposition, Distraction, Cessation of Activity, Nurse notified    Patients cultural, spiritual, [employer]al conflicts given the current situation:      Living Environment:  Pt lives in a Senior Apartment complex with elevator access.  Prior to admission, patients level of function was independent with mobility with near syncope prior to admit.  Equipment used at home: none.  DME owned (not currently used): none.  Upon discharge, patient will have assistance from unknown.    Objective:     Communicated with EDY Lin prior to session.  Patient found HOB elevated with bed alarm, peripheral IV, telemetry  upon PT entry to room.    General Precautions: Standard, fall  Orthopedic Precautions:N/A   Braces: N/A  Respiratory Status: Room air    Exams:  Cognitive Exam:  Patient is oriented to Person, Place, and Time  RLE ROM: WFL  RLE Strength: grossly 4/5  LLE ROM: WFL  LLE Strength: grossly 4/5    Functional Mobility:  Bed Mobility:     Supine to Sit: stand by assistance  Sit to Supine: stand by assistance  Transfers:     Sit to Stand:  contact guard assistance with no AD  Gait: x 200' with no AD with pt requiring minimal A to CGA to steady due to mild instability with pt not wanting to use a walker.      AM-PAC 6 CLICK MOBILITY  Total Score:20       Treatment & Education:  Pt was educated on the following: call light use, importance of OOB activity and functional mobility to negate the negative effects of prolonged bed rest during this hospitalization, safe transfers/ambulation and discharge planning recommendations/options.      Patient left HOB elevated with all lines intact, call button in reach, bed alarm on, and RN notified.    GOALS:   Multidisciplinary Problems       Physical Therapy Goals          Problem: Physical Therapy    Goal Priority Disciplines Outcome Goal Variances Interventions   Physical Therapy Goal     PT, PT/OT      Description: Goals to be met by: 1/2/24     Patient will increase functional  independence with mobility by performin. Supine to sit with Supervision  2. Sit to stand transfer with Supervision  3. Bed to chair transfer with Supervision   4. Gait  x 250 feet with Supervision .                              History:     Past Medical History:   Diagnosis Date    Arthritis     DM2 (diabetes mellitus, type 2)     Glaucoma capsular     HLD (hyperlipidemia)     Hypertension     Kidney stones     Skin lesion of breast        Past Surgical History:   Procedure Laterality Date    BREAST SURGERY      removed lump to right breast     EYE SURGERY      URETERAL STENT PLACEMENT      and removed       Time Tracking:     PT Received On: 23  PT Start Time: 942     PT Stop Time: 951  PT Total Time (min): 9 min     Billable Minutes: Evaluation 9      2023

## 2023-12-02 NOTE — PLAN OF CARE
Goals to be met by: 24     Patient will increase functional independence with mobility by performin. Supine to sit with Supervision  2. Sit to stand transfer with Supervision  3. Bed to chair transfer with Supervision   4. Gait  x 250 feet with Supervision .

## 2023-12-02 NOTE — PLAN OF CARE
Problem: Adult Inpatient Plan of Care  Goal: Optimal Comfort and Wellbeing  Outcome: Ongoing, Progressing     Problem: Diabetes Comorbidity  Goal: Blood Glucose Level Within Targeted Range  Outcome: Ongoing, Progressing     Problem: Renal Function Impairment (Acute Kidney Injury/Impairment)  Goal: Effective Renal Function  Outcome: Ongoing, Progressing

## 2023-12-02 NOTE — ASSESSMENT & PLAN NOTE
Pt with hypotension, IV fluids given in ED and responding well.  Suspect infection  CT of head negative for acute findings  Carotid ultrasound ordered   Updated echo showed no evidence of carotid artery occlusion or significant hemodynamic instability   Patient is not orthostatic  Neuro checks   Fall precautions   PT/OT following

## 2023-12-02 NOTE — SUBJECTIVE & OBJECTIVE
Past Medical History:   Diagnosis Date    Arthritis     DM2 (diabetes mellitus, type 2)     Glaucoma capsular     HLD (hyperlipidemia)     Hypertension     Kidney stones     Skin lesion of breast        Past Surgical History:   Procedure Laterality Date    BREAST SURGERY      removed lump to right breast     EYE SURGERY      URETERAL STENT PLACEMENT      and removed       Review of patient's allergies indicates:  No Known Allergies    No current facility-administered medications on file prior to encounter.     Current Outpatient Medications on File Prior to Encounter   Medication Sig    ciclopirox 0.77 % Gel Apply topically 2 (two) times daily.    dabigatran etexilate (PRADAXA) 75 mg Cap TAKE 1 CAPSULE(75 MG) BY MOUTH TWICE DAILY    ergocalciferol (ERGOCALCIFEROL) 50,000 unit Cap Take 1,250 mcg by mouth once daily.    ezetimibe (ZETIA) 10 mg tablet Take 10 mg by mouth every evening.    olmesartan-amLODIPin-hcthiazid 40-10-12.5 mg Tab Take 1 tablet by mouth once daily.    alcohol antiseptic pads (ALCOHOL PADS TOP) USE TWICE DAILY PRIOR TO FINGER STICK    alcohol antiseptic pads (ALCOHOL SWABS TOP)     amLODIPine (NORVASC) 10 MG tablet Take 10 mg by mouth once daily.    aspirin (ECOTRIN) 81 MG EC tablet Take 81 mg by mouth once daily.    cloNIDine (CATAPRES) 0.1 MG tablet Take 0.1 mg by mouth 2 (two) times daily.    esomeprazole (NEXIUM) 40 MG capsule Take 1 capsule (40 mg total) by mouth once daily.    traMADoL (ULTRAM) 50 mg tablet Take 50 mg by mouth 3 (three) times daily as needed.    TRAVATAN Z 0.004 % Drop 1 drop 2 (two) times daily.      Family History       Problem Relation (Age of Onset)    Coronary artery disease Mother    Diabetes Mother          Tobacco Use    Smoking status: Former    Smokeless tobacco: Never   Substance and Sexual Activity    Alcohol use: No    Drug use: No    Sexual activity: Not on file     Review of Systems   Constitutional: Negative.  Negative for appetite change, chills, fatigue,  fever and unexpected weight change.   HENT: Negative.  Negative for congestion, ear pain, hearing loss, rhinorrhea, sore throat and tinnitus.    Eyes: Negative.  Negative for pain, discharge and redness.   Respiratory: Negative.  Negative for cough, shortness of breath, wheezing and stridor.    Cardiovascular: Negative.  Negative for chest pain, palpitations and leg swelling.   Gastrointestinal:  Positive for abdominal pain, constipation and rectal pain. Negative for abdominal distention, diarrhea, nausea and vomiting.   Endocrine: Negative.  Negative for cold intolerance, heat intolerance, polydipsia, polyphagia and polyuria.   Genitourinary: Negative.  Negative for decreased urine volume, difficulty urinating, flank pain, hematuria and urgency.   Musculoskeletal: Negative.  Negative for arthralgias, gait problem and myalgias.   Skin: Negative.  Negative for pallor and rash.   Allergic/Immunologic: Negative.  Negative for environmental allergies, food allergies and immunocompromised state.   Neurological:  Positive for dizziness (with near syncope). Negative for syncope, facial asymmetry, weakness and headaches.   Hematological: Negative.    Psychiatric/Behavioral: Negative.  Negative for agitation, confusion, self-injury and suicidal ideas.      Objective:     Vital Signs (Most Recent):  Temp: 98.8 °F (37.1 °C) (12/02/23 1100)  Pulse: 92 (12/02/23 1100)  Resp: 16 (12/02/23 1100)  BP: (!) 157/71 (12/02/23 1100)  SpO2: 97 % (12/02/23 1100) Vital Signs (24h Range):  Temp:  [97.5 °F (36.4 °C)-98.8 °F (37.1 °C)] 98.8 °F (37.1 °C)  Pulse:  [] 92  Resp:  [16-32] 16  SpO2:  [94 %-100 %] 97 %  BP: (107-169)/(63-88) 157/71     Weight: 65.8 kg (145 lb)  Body mass index is 26.52 kg/m².     Physical Exam  Vitals reviewed.   Constitutional:       General: She is not in acute distress.     Appearance: Normal appearance. She is normal weight. She is not toxic-appearing.   HENT:      Head: Normocephalic and atraumatic.       Nose: Nose normal. No congestion or rhinorrhea.      Mouth/Throat:      Mouth: Mucous membranes are moist.      Pharynx: Oropharynx is clear.   Eyes:      General:         Right eye: No discharge.         Left eye: No discharge.      Extraocular Movements: Extraocular movements intact.      Conjunctiva/sclera: Conjunctivae normal.      Pupils: Pupils are equal, round, and reactive to light.   Cardiovascular:      Rate and Rhythm: Normal rate and regular rhythm.      Pulses: Normal pulses.      Heart sounds: Normal heart sounds.   Pulmonary:      Effort: Pulmonary effort is normal. No respiratory distress.      Breath sounds: Normal breath sounds.   Chest:      Chest wall: No tenderness.   Abdominal:      General: Abdomen is flat. Bowel sounds are normal. There is no distension.      Palpations: Abdomen is soft.      Tenderness: There is no abdominal tenderness. There is no right CVA tenderness, left CVA tenderness or guarding.   Musculoskeletal:         General: Normal range of motion.      Cervical back: Normal range of motion and neck supple.   Skin:     General: Skin is warm and dry.      Findings: No rash.   Neurological:      General: No focal deficit present.      Mental Status: She is alert and oriented to person, place, and time. Mental status is at baseline.      Motor: No weakness.   Psychiatric:         Mood and Affect: Mood normal.              CRANIAL NERVES     CN III, IV, VI   Pupils are equal, round, and reactive to light.       Significant Labs: All pertinent labs within the past 24 hours have been reviewed.  CBC:   Recent Labs   Lab 12/01/23  1123 12/02/23  0356   WBC 9.73 8.83   HGB 13.3 11.9*   HCT 43.1 39.0    263       CMP:   Recent Labs   Lab 12/01/23  1123 12/02/23  0356    141   K 3.8 3.2*    110   CO2 22* 20*   * 96   BUN 33* 34*   CREATININE 3.3* 2.2*   CALCIUM 9.7 9.0   PROT 7.3 6.7   ALBUMIN 3.6 3.4*   BILITOT 0.4 0.3   ALKPHOS 131 128   AST 13 13   ALT 8* 8*    ANIONGAP 10 11       Troponin:   Recent Labs   Lab 12/01/23  1123   TROPONINIHS 14.5       Urine Studies:   Recent Labs   Lab 12/01/23  1241   COLORU Yellow   APPEARANCEUA Hazy*   PHUR 6.0   SPECGRAV 1.020   PROTEINUA 1+*   GLUCUA Negative   KETONESU Negative   BILIRUBINUA 1+*   OCCULTUA Trace*   NITRITE Negative   UROBILINOGEN 2.0-3.0*   LEUKOCYTESUR 3+*   RBCUA 1   WBCUA >100*   BACTERIA Negative   SQUAMEPITHEL 6   HYALINECASTS 29*         Significant Imaging:   Imaging Results              US Carotid Bilateral (Final result)  Result time 12/01/23 16:23:19      Final result by Rodney Estrada MD (12/01/23 16:23:19)                   Narrative:    CMS MANDATED QUALITY DATA - CAROTID - 195    All measurements and percent stenosis described below were determined using NASCET criteria or criteria similar to NASCET, as defined by the Society of Radiologists in Ultrasound Consensus Conference, Radiology, 2003    US CAROTID DOPPLER BILATERAL    CLINICAL HISTORY:  83 years Female near syncope    COMPARISON: Carotid sonogram February 13, 2022    FINDINGS: Grayscale, color Doppler and  spectral Doppler evaluation of the carotid arteries was performed. Mild atherosclerotic plaque visualized at the carotid bulbs bilaterally.    Peak systolic velocity in the right CCA is 101.2 cm/sec, and peak systolic velocity in the right ICA is 80.1 cm/sec, with ICA/CCA ratio of less than 2.    Peak systolic velocity in the left CCA is 72.9 cm/sec, and peak systolic velocity in the left ICA is 62.5 cm/sec, with ICA/CCA ratio of less than 2.    The vertebral arteries are patent, with normal waveforms, and normal antegrade flow bilaterally.    IMPRESSION:    1. No Doppler evidence of carotid artery occlusion or hemodynamically significant stenosis.  2. Patent vertebral arteries with antegrade flow bilaterally.    Electronically signed by:  Rodney Estrada MD  12/01/2023 04:23 PM [facility] Workstation: [id number]                                      CT Head Without Contrast (Final result)  Result time 12/01/23 15:38:39      Final result by Yudith Maria MD (12/01/23 15:38:39)                   Narrative:    CMS MANDATED QUALITY DATA - CT RADIATION  436    All CT scans at this facility utilize dose modulation, iterative reconstruction, and/or weight based dosing when appropriate to reduce radiation dose to as low as reasonably achievable.  CT head without contrast    Clinical data: Dizziness    COMPARISON: 2/13/2022    FINDINGS: Noninfusion images were obtained from the skull base to the vertex. There is no intracranial mass, hemorrhage, or midline shift. Ventricles and sulci are mildly prominent. There are no pathologic extra-axial fluid collections. There is no evidence of cortical ischemic change. Changes of mild chronic microvascular white matter disease are noted. Cerebellum and brainstem are normal. There is vascular calcification in the cavernous portions of the internal carotid  arteries and distal vertebral arteries. The orbits are unremarkable. The calvarium is intact.    IMPRESSION:    1. No acute intracranial abnormalities. Chronic findings as discussed above.  Moderate vascular calcification in the cavernous carotid arteries and distal vertebral arteries    Electronically signed by:  Yudith Maria MD  12/01/2023 03:38 PM [facility] Workstation: [id number]                                     CT Abdomen Pelvis  Without Contrast (Final result)  Result time 12/01/23 13:55:46      Final result by Yudith Maria MD (12/01/23 13:55:46)                   Narrative:    CMS MANDATED QUALITY DATA - CT RADIATION - 436    All CT scans at this facility utilize dose modulation, iterative reconstruction, and/or weight based dosing when appropriate to reduce radiation dose to as low as reasonably achievable.    HISTORY: Abdominal pain    COMPARISON: 2/13/2022    FINDINGS: Noncontrast axial images were obtained. Nonenhanced study is tailored for the  detection of urolithiasis, and is insensitive for abnormalities of the solid organs, vasculature and hollow viscera.    CT ABDOMEN: The lung bases are clear. There is coronary artery calcification.  There is a small hiatal hernia.  The liver, spleen and pancreas have a normal noncontrast appearance.  Gallbladder is unremarkable  Adrenal glands are normal  Kidneys: Symmetric in size and contour. There is no hydronephrosis or renal stone. There is stable simple right renal cysts.    Stomach and small bowel are normal.  Colon: Demonstrates moderate wall thickening of the rectum measuring approximately 9 cm in length. Differential includes proctitis versus rectal malignancy and GI consult is recommended. There is colonic diverticulosis.    Aorta: Tortuous with a 4.2 x 3.8 cm infrarenal abdominal aortic aneurysm. The iliac arteries are mildly dilated. There is diffuse vascular calcification. The IVC is normal in caliber.    There is no mesenteric or retroperitoneal adenopathy.  Abdominal wall: Small fat-containing umbilical hernia. The musculature is normal.    There is no free fluid or free air.  There are mild degenerative changes at L4-5 and L5-S1 without acute osseous abnormality    CT PELVIS: The uterus and ovaries and bladder are normal. There is no pelvic adenopathy.    IMPRESSION: Diffuse circumferential wall thickening of the rectum measuring approximately 9 cm in length. While this may be secondary to infectious or inflammatory process malignancy cannot be excluded and GI consultation is recommended    4.2 x 3.8 cm infrarenal abdominal aortic aneurysm. Follow-up in one year is recommended    Colonic diverticulosis    Stable simple renal cysts    Degenerative changes of the spine    Electronically signed by:  Yudith Maria MD  12/01/2023 01:55 PM [facility] Workstation: [id number]                                     X-Ray Chest AP Portable (Final result)  Result time 12/01/23 11:50:46      Final result by Crow  Yudith SLOAN MD (12/01/23 11:50:46)                   Narrative:    XR CHEST 1 VIEW    CLINICAL HISTORY:  83 years Female dizziness    COMPARISON: 2/13/2022    FINDINGS: Cardiomediastinal silhouette is within normal limits.  Hypoinflation with stable dilatation of the aortic arch. Lungs are normally expanded with no airspace consolidation. No pleural effusion or pneumothorax. No acute osseous abnormality.    IMPRESSION: No acute pulmonary process.  Stable dilatation of the aortic arch    Electronically signed by:  Yudith Maria MD  12/01/2023 11:50 AM [facility] Workstation: [id number]

## 2023-12-02 NOTE — CONSULTS
INPATIENT NEPHROLOGY CONSULT   [facility]    Patient Name: Azul Clifford  Date: 12/02/2023    Reason for consultation: JAMIE    Chief Complaint:   Chief Complaint   Patient presents with    Near Syncope       History of Present Illness:  Patient is an 83-year-old female with a past medical history of DM 2, CKD, hypertension, with history of PE 7 months ago on long-term Pradaxa. Patient presents to the ED with complaints of near syncopal episode. Patient reports she was leaving bathroom going into main dining room at care center when she suddenly became dizzy and was assisted to sit by 2 employees. Patient reports decreased systolic in 80s during episode and on arrival to ED. Patient was given 2L bolus IV fluids, blood pressure responding well thus far. Patient denies loss of consciousness or fall. Patient also complains of constipation for the last 3 days and mild abdominal discomfort. UA positive for UTI and patient was started on ceftriaxone in ED. CT of abdomen was completed which shows rectal thickening, GI consulted.  Last echo completed 02/2022 with ejection fraction of 60% and decreased dystolic function. Patient labs also reveal JAMIE with creatinine of 3.3, baseline 1.5. Patient does have CKD stage 3 and follows with Nephrology, Dr Mckeon. Patient denies chest pain, shortness for breath, dysuria, nausea or vomiting. Patient reports her dizziness has diminished since arrival to ED. We are consulted for JAMIE.    Notable home meds: clonidine, norvasc, olmesartan/HCTZ, ergo    Interval History:  12/2- severe hypotension at admission with SBP 80s- now up to 140-160, on RA, UOP 200cc + voids, UA infected and with hyaline casts, blood cx neg, urine cx pending, CT imaging with normal kidneys    Plan of Care:    Assessment:  UTI  JAMIE with baseline CKD III  Hypotension/Volume depletion; Baseline HTN  Hypokalemia  HypoMg  Lactic acidosis  SHPT  Anemia of CKD    Plan:    - f/u urine cx- dose meds for  CrCl < 30  - renal function is improved- would be helpful to have more accurate documentation of UOP  - cut back NS to 50cc/hr  - hold benicar/HCTZ- ok to resume clonidine and norvasc  - give K, Mg repletion today  - trend lactate  - resume vitamin D at discharge  - H/H within baseline range    Thank you for allowing us to participate in this patient's care. We will continue to follow.    Vital Signs:  Temp Readings from Last 3 Encounters:   12/02/23 98.1 °F (36.7 °C)   05/02/23 97.9 °F (36.6 °C)   11/02/22 97.3 °F (36.3 °C)       Pulse Readings from Last 3 Encounters:   12/02/23 103   05/02/23 74   11/02/22 67       BP Readings from Last 3 Encounters:   12/02/23 (!) 159/78   05/02/23 (!) 84/45   11/02/22 127/66       Weight:  Wt Readings from Last 3 Encounters:   12/01/23 65.8 kg (145 lb)   12/01/23 59 kg (130 lb)   05/02/23 66.1 kg (145 lb 11.2 oz)       INS/OUTS:  I/O last 3 completed shifts:  In: 1320 [P.O.:220; IV Piggyback:1100]  Out: 200 [Urine:200]  I/O this shift:  In: 150 [P.O.:150]  Out: 200 [Urine:200]    Past Medical & Surgical History:  Past Medical History:   Diagnosis Date    Arthritis     DM2 (diabetes mellitus, type 2)     Glaucoma capsular     HLD (hyperlipidemia)     Hypertension     Kidney stones     Skin lesion of breast        Past Surgical History:   Procedure Laterality Date    BREAST SURGERY      removed lump to right breast     EYE SURGERY      URETERAL STENT PLACEMENT      and removed       Past Social History:  Social History     Socioeconomic History    Marital status:    Tobacco Use    Smoking status: Former    Smokeless tobacco: Never   Substance and Sexual Activity    Alcohol use: No    Drug use: No       Medications:  Scheduled Meds:   cefTRIAXone (ROCEPHIN) IVPB  1 g Intravenous Q24H    docusate sodium  100 mg Oral Daily    enoxparin  1 mg/kg Subcutaneous Q24H (prophylaxis, 1700)    ezetimibe  10 mg Oral QHS    pantoprazole  40 mg Intravenous Daily    polyethylene glycol  17  "g Oral Daily    sodium chloride 0.9%  500 mL Intravenous Once     Continuous Infusions:   sodium chloride 0.9% 100 mL/hr at 12/02/23 0025     PRN Meds:.acetaminophen, dextrose 50%, dextrose 50%, glucagon (human recombinant), glucose, glucose, insulin aspart U-100, melatonin, metoclopramide HCl, naloxone, sodium chloride 0.9%  No current facility-administered medications on file prior to encounter.     Current Outpatient Medications on File Prior to Encounter   Medication Sig Dispense Refill    ciclopirox 0.77 % Gel Apply topically 2 (two) times daily.      dabigatran etexilate (PRADAXA) 75 mg Cap TAKE 1 CAPSULE(75 MG) BY MOUTH TWICE DAILY 60 capsule 2    ergocalciferol (ERGOCALCIFEROL) 50,000 unit Cap Take 1,250 mcg by mouth once daily.      ezetimibe (ZETIA) 10 mg tablet Take 10 mg by mouth every evening.      olmesartan-amLODIPin-hcthiazid 40-10-12.5 mg Tab Take 1 tablet by mouth once daily.      alcohol antiseptic pads (ALCOHOL PADS TOP) USE TWICE DAILY PRIOR TO FINGER STICK      alcohol antiseptic pads (ALCOHOL SWABS TOP)       amLODIPine (NORVASC) 10 MG tablet Take 10 mg by mouth once daily.      aspirin (ECOTRIN) 81 MG EC tablet Take 81 mg by mouth once daily.      cloNIDine (CATAPRES) 0.1 MG tablet Take 0.1 mg by mouth 2 (two) times daily.      esomeprazole (NEXIUM) 40 MG capsule Take 1 capsule (40 mg total) by mouth once daily. 30 capsule 2    traMADoL (ULTRAM) 50 mg tablet Take 50 mg by mouth 3 (three) times daily as needed.      TRAVATAN Z 0.004 % Drop 1 drop 2 (two) times daily.          Allergies:  Patient has no known allergies.    Past Family History:  Reviewed; refer to Hospitalist Admission Note    Review of Systems:  Review of Systems - All 14 systems reviewed and negative, except as noted in HPI    Physical Exam:  BP (!) 159/78 (Patient Position: Standing)   Pulse 103   Temp 98.1 °F (36.7 °C)   Resp 17   Ht 5' 2" (1.575 m)   Wt 65.8 kg (145 lb)   SpO2 96%   BMI 26.52 kg/m²     General " Appearance:    NAD, AAO x 3, cooperative, appears stated age   Head:    Normocephalic, atraumatic   Eyes:    PER, EOMI, and conjunctiva/sclera clear bilaterally        Mouth:   Moist mucus membranes, no thrush or oral lesions, normal      dentition   Back:     No CVA tenderness   Lungs:     Clear to auscultation bilaterally, no wheeze, crackles, rales      or rhonchi, symmetric air movement, respirations unlabored   Heart:    Regular rate and rhythm, S1 and S2 normal, no murmur, rub   or gallop   Abdomen:     Soft, non-tender, non-distended, bowel sounds active all four   quadrants, no RT or guarding, no masses, no organomegaly   Extremities:   Warm and well perfused, distal pulses intact, no cyanosis or    peripheral edema   MSK:   No joint or muscle swelling, tenderness or deformity   Skin:   Skin color, texture, turgor normal, no rashes or lesions   Neurologic/Psychiatric:   CNII-XII intact, normal strength and sensation       throughout, no asterixis; normal affect, memory, judgement     and insight     Results:  Lab Results   Component Value Date     12/02/2023    K 3.2 (L) 12/02/2023     12/02/2023    CO2 20 (L) 12/02/2023    BUN 34 (H) 12/02/2023    CREATININE 2.2 (H) 12/02/2023    CALCIUM 9.0 12/02/2023    ANIONGAP 11 12/02/2023    ESTGFRAFRICA 36.9 (A) 02/18/2022    EGFRNONAA 32.0 (A) 02/18/2022       Lab Results   Component Value Date    CALCIUM 9.0 12/02/2023    PHOS 2.9 12/02/2023       Recent Labs   Lab 12/02/23  0356   WBC 8.83   RBC 4.49   HGB 11.9*   HCT 39.0      MCV 87   MCH 26.5*   MCHC 30.5*       I have personally reviewed pertinent radiological imaging and reports.    I have spent > 70 minutes providing care for this patient for the above diagnoses. These services have included chart/data/imaging review, evaluation, exam, formulation of plan, , note preparation, and discussions with staff involved in this patient's care.    Gregorio Wilkerson MD MPH  [facility]  [address] [place] [address]  [phone] (p)  [phone] (f)

## 2023-12-02 NOTE — PLAN OF CARE
MOON form signed with patient at bedside. Patient verbalized an understanding and was given copy.    12/02/23 0225   CA Message   Medicare Outpatient and Observation Notification regarding financial responsibility Given to patient/caregiver;Explained to patient/caregiver;Signed/date by patient/caregiver   Date CA was signed 12/02/23   Time CA was signed 3170

## 2023-12-02 NOTE — ASSESSMENT & PLAN NOTE
Complaints of decreased urine output-dehydration and mild lower abdominal pain  Ceftriaxone IV  Culture still pending   Trend infection labs  - lactic elevated

## 2023-12-02 NOTE — PLAN OF CARE
[facility]  Initial Discharge Assessment       Primary Care Provider: Juancho Graff MD    Admission Diagnosis: JAMIE (acute kidney injury) [N17.9]    Admission Date: 12/1/2023  Expected Discharge Date:     Transition of Care Barriers: None    Initial assessment completed with patient at bedside. Patient's granddaughter Josiane was present with patient's permission. Patient is AAO4x2. SW verified facesheet information. Patient is reportedly independent with all ADLs and only uses grab bars and a shower chair. She currently resides at [facility] which is an independent living facility for seniors, and her home is accessible. She does not have HH, and is not on coumadin or dialysis. Denies any medication affordability/compliance issues. Patient plans to return home, and her daughter, Jeaneth, will provide transportation. No discharge needs anticipated. SW/CM will continue to follow for any discharge needs that may arise.     NOTE: Declines HH or OP services.    Payor: [employer] MANAGED MEDICARE / Plan: [employer] PPO SPECIAL NEEDS / Product Type: Medicare Advantage /     Extended Emergency Contact Information  Primary Emergency Contact: DarrinJeaneth   [place]  Mobile Phone: [phone]  Relation: Daughter  Secondary Emergency Contact: EDWARD MIRELES  Mobile Phone: [phone]  Relation: Daughter  Preferred language: English   needed? No    Discharge Plan A: Home  Discharge Plan B: Home      [id number] [address] & [address] [address]  Phone: [phone] Fax: [phone]      Initial Assessment (most recent)       Adult Discharge Assessment - 12/02/23 1533          Discharge Assessment    Assessment Type Discharge Planning Assessment     Confirmed/corrected address, phone number and insurance Yes     Confirmed Demographics Correct on Facesheet     Source of Information patient     When was  your last doctors appointment? 11/29/23     Does patient/caregiver understand observation status Yes     Communicated MANOJ with patient/caregiver Date not available/Unable to determine     Reason For Admission UTI     People in Home alone     Facility Arrived From: Home - Resides at [facility]     Do you expect to return to your current living situation? Yes     Do you have help at home or someone to help you manage your care at home? Yes     Who are your caregiver(s) and their phone number(s)? Family, when needed.     Prior to hospitilization cognitive status: No Deficits;Alert/Oriented     Current cognitive status: Alert/Oriented;No Deficits     Walking or Climbing Stairs --   Independent with all ADLs    Dressing/Bathing --   Independent with all ADLs    Home Accessibility wheelchair accessible     Equipment Currently Used at Home grab bar;shower chair     Readmission within 30 days? No     Patient currently being followed by outpatient case management? No     Do you currently have service(s) that help you manage your care at home? No     Do you take prescription medications? Yes     Do you have prescription coverage? Yes     Coverage [employer] Managed Medicare     Do you have any problems affording any of your prescribed medications? No     Is the patient taking medications as prescribed? yes     Who is going to help you get home at discharge? Jeaneth - daughter     How do you get to doctors appointments? family or friend will provide     Are you on dialysis? No     Do you take coumadin? No     DME Needed Upon Discharge  none     Discharge Plan discussed with: Patient     Transition of Care Barriers None     Discharge Plan A Home     Discharge Plan B Home

## 2023-12-03 VITALS
DIASTOLIC BLOOD PRESSURE: 78 MMHG | HEART RATE: 85 BPM | OXYGEN SATURATION: 98 % | HEIGHT: 62 IN | RESPIRATION RATE: 16 BRPM | TEMPERATURE: 99 F | WEIGHT: 145 LBS | BODY MASS INDEX: 26.68 KG/M2 | SYSTOLIC BLOOD PRESSURE: 128 MMHG

## 2023-12-03 LAB
ALBUMIN SERPL BCP-MCNC: 3.1 G/DL (ref 3.5–5.2)
ALP SERPL-CCNC: 122 U/L (ref 55–135)
ALT SERPL W/O P-5'-P-CCNC: 8 U/L (ref 10–44)
ANION GAP SERPL CALC-SCNC: 4 MMOL/L (ref 8–16)
AST SERPL-CCNC: 11 U/L (ref 10–40)
BASOPHILS # BLD AUTO: 0.08 K/UL (ref 0–0.2)
BASOPHILS NFR BLD: 1.1 % (ref 0–1.9)
BILIRUB SERPL-MCNC: 0.2 MG/DL (ref 0.1–1)
BUN SERPL-MCNC: 21 MG/DL (ref 8–23)
CALCIUM SERPL-MCNC: 9.3 MG/DL (ref 8.7–10.5)
CHLORIDE SERPL-SCNC: 113 MMOL/L (ref 95–110)
CO2 SERPL-SCNC: 23 MMOL/L (ref 23–29)
CREAT SERPL-MCNC: 1.4 MG/DL (ref 0.5–1.4)
DIFFERENTIAL METHOD: ABNORMAL
EOSINOPHIL # BLD AUTO: 0.1 K/UL (ref 0–0.5)
EOSINOPHIL NFR BLD: 1.7 % (ref 0–8)
ERYTHROCYTE [DISTWIDTH] IN BLOOD BY AUTOMATED COUNT: 14.1 % (ref 11.5–14.5)
EST. GFR  (NO RACE VARIABLE): 36.9 ML/MIN/1.73 M^2
GLUCOSE SERPL-MCNC: 88 MG/DL (ref 70–110)
GLUCOSE SERPL-MCNC: 92 MG/DL (ref 70–110)
GLUCOSE SERPL-MCNC: 99 MG/DL (ref 70–110)
HCT VFR BLD AUTO: 37.1 % (ref 37–48.5)
HGB BLD-MCNC: 11.6 G/DL (ref 12–16)
IMM GRANULOCYTES # BLD AUTO: 0.02 K/UL (ref 0–0.04)
IMM GRANULOCYTES NFR BLD AUTO: 0.3 % (ref 0–0.5)
LACTATE SERPL-SCNC: 1 MMOL/L (ref 0.5–1.9)
LYMPHOCYTES # BLD AUTO: 1.7 K/UL (ref 1–4.8)
LYMPHOCYTES NFR BLD: 23.8 % (ref 18–48)
MAGNESIUM SERPL-MCNC: 1.7 MG/DL (ref 1.6–2.6)
MCH RBC QN AUTO: 26.8 PG (ref 27–31)
MCHC RBC AUTO-ENTMCNC: 31.3 G/DL (ref 32–36)
MCV RBC AUTO: 86 FL (ref 82–98)
MONOCYTES # BLD AUTO: 0.7 K/UL (ref 0.3–1)
MONOCYTES NFR BLD: 9 % (ref 4–15)
NEUTROPHILS # BLD AUTO: 4.6 K/UL (ref 1.8–7.7)
NEUTROPHILS NFR BLD: 64.1 % (ref 38–73)
NRBC BLD-RTO: 0 /100 WBC
PHOSPHATE SERPL-MCNC: 2.3 MG/DL (ref 2.7–4.5)
PLATELET # BLD AUTO: 263 K/UL (ref 150–450)
PMV BLD AUTO: 9.9 FL (ref 9.2–12.9)
POTASSIUM SERPL-SCNC: 4.5 MMOL/L (ref 3.5–5.1)
PROT SERPL-MCNC: 6.1 G/DL (ref 6–8.4)
RBC # BLD AUTO: 4.33 M/UL (ref 4–5.4)
SODIUM SERPL-SCNC: 140 MMOL/L (ref 136–145)
WBC # BLD AUTO: 7.23 K/UL (ref 3.9–12.7)

## 2023-12-03 PROCEDURE — 25000003 PHARM REV CODE 250: Performed by: PHYSICAL THERAPY ASSISTANT

## 2023-12-03 PROCEDURE — 82962 GLUCOSE BLOOD TEST: CPT

## 2023-12-03 PROCEDURE — C9113 INJ PANTOPRAZOLE SODIUM, VIA: HCPCS | Performed by: PHYSICAL THERAPY ASSISTANT

## 2023-12-03 PROCEDURE — 83605 ASSAY OF LACTIC ACID: CPT | Performed by: NURSE PRACTITIONER

## 2023-12-03 PROCEDURE — 36415 COLL VENOUS BLD VENIPUNCTURE: CPT | Performed by: NURSE PRACTITIONER

## 2023-12-03 PROCEDURE — 84100 ASSAY OF PHOSPHORUS: CPT | Performed by: PHYSICAL THERAPY ASSISTANT

## 2023-12-03 PROCEDURE — 25000003 PHARM REV CODE 250: Performed by: NURSE PRACTITIONER

## 2023-12-03 PROCEDURE — 97530 THERAPEUTIC ACTIVITIES: CPT | Mod: CQ

## 2023-12-03 PROCEDURE — 97116 GAIT TRAINING THERAPY: CPT | Mod: CQ

## 2023-12-03 PROCEDURE — 97165 OT EVAL LOW COMPLEX 30 MIN: CPT

## 2023-12-03 PROCEDURE — 85025 COMPLETE CBC W/AUTO DIFF WBC: CPT | Performed by: PHYSICAL THERAPY ASSISTANT

## 2023-12-03 PROCEDURE — 63600175 PHARM REV CODE 636 W HCPCS: Performed by: PHYSICAL THERAPY ASSISTANT

## 2023-12-03 PROCEDURE — 83735 ASSAY OF MAGNESIUM: CPT | Performed by: PHYSICAL THERAPY ASSISTANT

## 2023-12-03 PROCEDURE — 97535 SELF CARE MNGMENT TRAINING: CPT

## 2023-12-03 PROCEDURE — 96376 TX/PRO/DX INJ SAME DRUG ADON: CPT

## 2023-12-03 PROCEDURE — 80053 COMPREHEN METABOLIC PANEL: CPT | Performed by: PHYSICAL THERAPY ASSISTANT

## 2023-12-03 PROCEDURE — G0378 HOSPITAL OBSERVATION PER HR: HCPCS

## 2023-12-03 RX ORDER — CEPHALEXIN 500 MG/1
500 CAPSULE ORAL EVERY 12 HOURS
Qty: 6 CAPSULE | Refills: 0 | Status: SHIPPED | OUTPATIENT
Start: 2023-12-03 | End: 2023-12-06

## 2023-12-03 RX ORDER — SODIUM,POTASSIUM PHOSPHATES 280-250MG
2 POWDER IN PACKET (EA) ORAL
Status: DISCONTINUED | OUTPATIENT
Start: 2023-12-03 | End: 2023-12-03 | Stop reason: HOSPADM

## 2023-12-03 RX ORDER — LANOLIN ALCOHOL/MO/W.PET/CERES
800 CREAM (GRAM) TOPICAL
Status: DISCONTINUED | OUTPATIENT
Start: 2023-12-03 | End: 2023-12-03 | Stop reason: HOSPADM

## 2023-12-03 RX ADMIN — CLONIDINE HYDROCHLORIDE 0.1 MG: 0.1 TABLET ORAL at 08:12

## 2023-12-03 RX ADMIN — POLYETHYLENE GLYCOL 3350 17 G: 17 POWDER, FOR SOLUTION ORAL at 08:12

## 2023-12-03 RX ADMIN — PANTOPRAZOLE SODIUM 40 MG: 40 INJECTION, POWDER, FOR SOLUTION INTRAVENOUS at 08:12

## 2023-12-03 RX ADMIN — DOCUSATE SODIUM 100 MG: 100 CAPSULE, LIQUID FILLED ORAL at 08:12

## 2023-12-03 NOTE — DISCHARGE SUMMARY
[facility] Medicine  Discharge Summary      Patient Name: Azul Clifford  MRN: [id number]  GERMAN: [id number]  Patient Class: OP- Observation  Admission Date: 12/1/2023  Hospital Length of Stay: 0 days  Discharge Date and Time:  12/03/2023 11:41 AM  Attending Physician: Nhung Strong MD   Discharging Provider: JOSE Nair  Primary Care Provider: Juancho Graff MD    Primary Care Team: Networked reference to record PCT     HPI:   Patient is an 83-year-old female with a past medical history of DM 2, CKD, hypertension, with history of PE 7 months ago on long-term Pradaxa. Patient daughter at bedside during exam. Patient presents to the ED today with complaints of near syncopal episode. Patient reports she was leaving bathroom going into main dining room at care center when she suddenly became dizzy and was assisted to sit by 2 employees. Patient reports decreased systolic in 80s during episode and on arrival to ED. Patient was given 2L bolus IV fluids, blood pressure responding well thus far. Patient denies loss of consciousness or fall. Patient also complains of constipation for the last 3 days and mild abdominal discomfort. UA positive for UTI and patient was started on ceftriaxone in ED. CT of abdomen was completed which shows rectal thickening, GI consulted.  Last echo completed 02/2022 with ejection fraction of 60% and decreased dystolic function. Patient labs also reveal JAMIE with creatinine of 3.3, baseline 1.5. Patient does have CKD stage 3 and follows with Nephrology, Dr Mckeon. Patient denies current dialysis at this time. Nephrology consulted.  Patient denies chest pain, shortness for breath, dysuria, nausea or vomiting. Patient reports her dizziness has diminished since arrival to ED. Full code.    * No surgery found *      Hospital Course:   83yF who presents for evaluation of near syncope and dizziness.  Chest x-ray with no acute processes, CTA negative for acute  intracranial abnormalities, CT abdomen pelvis showed diffuse circumferential wall thickening of the rectum.  She was also found to have abnormal urinalysis and elevated lactic of 2.1 baseline around 1.5.  Patient started on IV antibiotics, administered cautious IV fluids, and consult placed for GI and Nephrology. Nephrology evaluated the patient noting acute on chronic CKD and UTI with lactic acidosis. 2D echo showed EF of 64% with normal diastolic function. Responded well to antibiotic therapy, Lactic normalized, urine culture NGTD. Ambulatory referral to GI for evaluation of Abnormal rectal wall thickening. No problem with BM - voiding well, denies pain, Hgb stable. Creatine normalized. For discharge will continue clonidine but discontinue benicar/HCTZ. Kelflex UTI.      Goals of Care Treatment Preferences:  Code Status: Full Code      Consults:   Consults (From admission, onward)          Status Ordering Provider     Inpatient consult to Gastroenterology  Once        Provider:  Rodri Lehman MD    Acknowledged GRACE DIAZ     Inpatient consult to Nephrology  Once        Provider:  Gregorio Wilkerson MD    Completed GRACE DIAZ                Final Active Diagnoses:    Diagnosis Date Noted POA    PRINCIPAL PROBLEM:  UTI (urinary tract infection) [N39.0] 12/01/2023 Yes    JAMIE (acute kidney injury) [N17.9] 12/01/2023 Yes    Near syncope [R55] 02/14/2022 Yes    Rectal abnormality [K62.9] 12/02/2023 Yes    Constipation [K59.00] 12/01/2023 Yes    Type 2 diabetes mellitus, without long-term current use of insulin [E11.9] 02/14/2022 Yes     Chronic    Primary hypertension [I10] 02/14/2022 Yes     Chronic      Problems Resolved During this Admission:       Discharged Condition: stable    Disposition: Home or Self Care    Follow Up: PCP and GI    Patient Instructions:      Ambulatory referral/consult to Gastroenterology   Standing Status: Future   Referral Priority: Routine Referral Type: Consultation    Referral Reason: Specialty Services Required   Requested Specialty: Gastroenterology   Number of Visits Requested: 1     Diet Cardiac     Notify your health care provider if you experience any of the following:  temperature >100.4     Notify your health care provider if you experience any of the following:  persistent dizziness, light-headedness, or visual disturbances     Notify your health care provider if you experience any of the following:  increased confusion or weakness     Activity as tolerated       Significant Diagnostic Studies: Labs: CMP   Recent Labs   Lab 12/02/23  0356 12/03/23  0350    140   K 3.2* 4.5    113*   CO2 20* 23   GLU 96 99   BUN 34* 21   CREATININE 2.2* 1.4   CALCIUM 9.0 9.3   PROT 6.7 6.1   ALBUMIN 3.4* 3.1*   BILITOT 0.3 0.2   ALKPHOS 128 122   AST 13 11   ALT 8* 8*   ANIONGAP 11 4*   , CBC   Recent Labs   Lab 12/02/23  0356 12/03/23  0350   WBC 8.83 7.23   HGB 11.9* 11.6*   HCT 39.0 37.1    263     Recent Labs   Lab 12/02/23  0356   HGBA1C 6.4*       Pending Diagnostic Studies:       None           Medications:  Reconciled Home Medications:      Medication List        START taking these medications      cephALEXin 500 MG capsule  Commonly known as: KEFLEX  Take 1 capsule (500 mg total) by mouth every 12 (twelve) hours. for 3 days            CONTINUE taking these medications      * ALCOHOL SWABS TOP     * ALCOHOL PADS TOP  USE TWICE DAILY PRIOR TO FINGER STICK     aspirin 81 MG EC tablet  Commonly known as: ECOTRIN  Take 81 mg by mouth once daily.     ciclopirox 0.77 % Gel  Apply topically 2 (two) times daily.     cloNIDine 0.1 MG tablet  Commonly known as: CATAPRES  Take 0.1 mg by mouth 2 (two) times daily.     dabigatran etexilate 75 mg Cap  Commonly known as: PRADAXA  TAKE 1 CAPSULE(75 MG) BY MOUTH TWICE DAILY     ergocalciferol 50,000 unit Cap  Commonly known as: ERGOCALCIFEROL  Take 1,250 mcg by mouth once daily.     esomeprazole 40 MG capsule  Commonly known  as: NEXIUM  Take 1 capsule (40 mg total) by mouth once daily.     ezetimibe 10 mg tablet  Commonly known as: ZETIA  Take 10 mg by mouth every evening.     traMADoL 50 mg tablet  Commonly known as: ULTRAM  Take 50 mg by mouth 3 (three) times daily as needed.     TRAVATAN Z 0.004 % ophthalmic solution  Generic drug: travoprost  1 drop 2 (two) times daily.           * This list has 2 medication(s) that are the same as other medications prescribed for you. Read the directions carefully, and ask your doctor or other care provider to review them with you.                STOP taking these medications      amLODIPine 10 MG tablet  Commonly known as: NORVASC     olmesartan-amLODIPin-hcthiazid 40-10-12.5 mg Tab              Indwelling Lines/Drains at time of discharge:   Lines/Drains/Airways       None                   Time spent on the discharge of patient: 35 minutes         JOSE Nair  Department of Hospital Medicine  [facility]

## 2023-12-03 NOTE — PT/OT/SLP EVAL
"Occupational Therapy   Evaluation    Name: Azul Clifford  MRN: [id number]  Admitting Diagnosis: UTI (urinary tract infection)  Recent Surgery: * No surgery found *      Recommendations:     Discharge Recommendations: No Therapy Indicated  Discharge Equipment Recommendations:  walker, rolling  Barriers to discharge:  None    Assessment:     Azul Clifford is a 83 y.o. female with a medical diagnosis of UTI (urinary tract infection). Performance deficits affecting function: impaired endurance, gait instability, impaired self care skills.  Patent reports she is feeling better this morning.    Rehab Prognosis: Fair; patient would benefit from acute skilled OT services to address these deficits and reach maximum level of function.       Plan:     Patient to be seen 5 x/weekly  to address the above listed problems via    Plan of Care Expires: 1/3/2024   Plan of Care Reviewed with: patient    Subjective     Chief Complaint: "stomach soreness"  Patient/Family Comments/goals: return home     Occupational Profile:  Living Environment: lives alone in senior apartment with elevator access  Previous level of function: performed self care with without use of AD  Equipment Used at Home: grab bar, shower chair  Assistance upon Discharge: daughter is available for assistance at discharge    Pain/Comfort:  Pain Rating 1: 0/10  Pain Rating Post-Intervention 1: 0/10    Patients cultural, spiritual, Alevism conflicts given the current situation: no    Objective:     Communicated with: nurse prior to session.  Patient found HOB elevated with bed alarm, peripheral IV, telemetry upon OT entry to room.    General Precautions: Standard, fall  Orthopedic Precautions: N/A  Braces: N/A  Respiratory Status: Room air    Occupational Performance:    Bed Mobility:    Patient completed Rolling/Turning to Right with contact guard assistance  Patient completed Scooting/Bridging with contact guard assistance  Patient completed Supine to Sit with " contact guard assistance    Functional Mobility/Transfers:  Patient completed Sit <> Stand Transfer with contact guard assistance  with  no assistive device   Patient completed Toilet Transfer Step Transfer technique with contact guard assistance with  no AD  Functional Mobility: CGA for ambulating to bathroom    Activities of Daily Living:  Feeding:  stand by assistance for self feeding with HOB elevated   Grooming: stand by assistance/setup for oral care and washing face while seated at edge of bed  Toileting: contact guard assistance for toileting including hygiene and clothing management     Cognitive/Visual Perceptual:  Cognitive/Psychosocial Skills:     -       Oriented to: Person, Place, Time, and Situation   -       Follows Commands/attention:Follows multistep  commands  -       Communication: clear/fluent  -       Memory: No Deficits noted  -       Safety awareness/insight to disability: intact   -       Mood/Affect/Coping skills/emotional control: Appropriate to situation and Cooperative    Physical Exam:  Upper Extremity Range of Motion:     -       Right Upper Extremity: WFL  -       Left Upper Extremity: WFL  Upper Extremity Strength:    -       Right Upper Extremity: WFL  -       Left Upper Extremity: WFL   Strength:    -       Right Upper Extremity: WFL  -       Left Upper Extremity: WFL  Fine Motor Coordination:    -       Intact    AMPAC 6 Click ADL:  AMPAC Total Score: 22    Treatment & Education:  Patient was educated on role of OT/POC, safety during transfers and self care activities, importance of activity during hospitalization, equipment needs, discharge planning and reviewed use of call bell for assistance.     Patient left HOB elevated with all lines intact and call button in reach, bed alarm on.    GOALS:   UE Dressing with Supervision.  LE Dressing with Supervision.  Grooming while standing with Supervision.  Toileting from toilet with Supervision for hygiene and clothing management.    Bathing from  tub bench with Supervision.      History:     Past Medical History:   Diagnosis Date    Arthritis     DM2 (diabetes mellitus, type 2)     Glaucoma capsular     HLD (hyperlipidemia)     Hypertension     Kidney stones     Skin lesion of breast          Past Surgical History:   Procedure Laterality Date    BREAST SURGERY      removed lump to right breast     EYE SURGERY      URETERAL STENT PLACEMENT      and removed       Time Tracking:     OT Date of Treatment: 12/03/23  OT Start Time: 0910  OT Stop Time: 0930  OT Total Time (min): 20 min    Billable Minutes:Evaluation 10  Self Care/Home Management 10    12/3/2023

## 2023-12-03 NOTE — PROGRESS NOTES
INPATIENT NEPHROLOGY Progress Note   [facility]    Patient Name: Azul Clifford  Date: 12/03/2023    Reason for consultation: JAMIE    Chief Complaint:   Chief Complaint   Patient presents with    Near Syncope       History of Present Illness:  Patient is an 83-year-old female with a past medical history of DM 2, CKD, hypertension, with history of PE 7 months ago on long-term Pradaxa. Patient presents to the ED with complaints of near syncopal episode. Patient reports she was leaving bathroom going into main dining room at care center when she suddenly became dizzy and was assisted to sit by 2 employees. Patient reports decreased systolic in 80s during episode and on arrival to ED. Patient was given 2L bolus IV fluids, blood pressure responding well thus far. Patient denies loss of consciousness or fall. Patient also complains of constipation for the last 3 days and mild abdominal discomfort. UA positive for UTI and patient was started on ceftriaxone in ED. CT of abdomen was completed which shows rectal thickening, GI consulted.  Last echo completed 02/2022 with ejection fraction of 60% and decreased dystolic function. Patient labs also reveal JAMIE with creatinine of 3.3, baseline 1.5. Patient does have CKD stage 3 and follows with Nephrology, Dr Mckeon. Patient denies chest pain, shortness for breath, dysuria, nausea or vomiting. Patient reports her dizziness has diminished since arrival to ED. We are consulted for JAMIE.    Notable home meds: clonidine, norvasc, olmesartan/HCTZ, ergo    Interval History:  12/2- severe hypotension at admission with SBP 80s- now up to 140-160, on RA, UOP 200cc + voids, UA infected and with hyaline casts, blood cx neg, urine cx pending, CT imaging with normal kidneys  12/3- VSS, on RA, UOP 1.2L    Plan of Care:    Assessment:  UTI  JAMIE with baseline CKD III  Hypotension/Volume depletion; Baseline HTN  Hypokalemia  HypoMg  Lactic acidosis  SHPT  Anemia of  CKD    Plan:    - urine cx NGTD- dose meds for CrCl 30-60  - renal function is improved- nonoliguric  - stop IVFs  - resume clonidine and norvasc- hold benicar/HCTZ  - serum K replete- give Mg repletion today  - lactate normal  - resume vitamin D at discharge  - H/H within baseline range    Thank you for allowing us to participate in this patient's care. We will continue to follow.    Vital Signs:  Temp Readings from Last 3 Encounters:   12/03/23 98.7 °F (37.1 °C) (Oral)   05/02/23 97.9 °F (36.6 °C)   11/02/22 97.3 °F (36.3 °C)       Pulse Readings from Last 3 Encounters:   12/03/23 85   05/02/23 74   11/02/22 67       BP Readings from Last 3 Encounters:   12/03/23 128/78   05/02/23 (!) 84/45   11/02/22 127/66       Weight:  Wt Readings from Last 3 Encounters:   12/01/23 65.8 kg (145 lb)   12/01/23 59 kg (130 lb)   05/02/23 66.1 kg (145 lb 11.2 oz)       INS/OUTS:  I/O last 3 completed shifts:  In: 2720 [P.O.:1170; I.V.:1550]  Out: 1401 [Urine:1400; Stool:1]  I/O this shift:  In: 400 [P.O.:400]  Out: 100 [Urine:100]      Medications:  Scheduled Meds:   cefTRIAXone (ROCEPHIN) IVPB  1 g Intravenous Q24H    cloNIDine  0.1 mg Oral BID    docusate sodium  100 mg Oral Daily    enoxparin  1 mg/kg Subcutaneous Q24H (prophylaxis, 1700)    ezetimibe  10 mg Oral QHS    pantoprazole  40 mg Intravenous Daily    polyethylene glycol  17 g Oral Daily     Continuous Infusions:   sodium chloride 0.9% 50 mL/hr at 12/02/23 0934     PRN Meds:.acetaminophen, dextrose 50%, dextrose 50%, glucagon (human recombinant), glucose, glucose, insulin aspart U-100, magnesium oxide, magnesium oxide, melatonin, metoclopramide HCl, naloxone, potassium bicarbonate, potassium bicarbonate, potassium bicarbonate, potassium, sodium phosphates, potassium, sodium phosphates, potassium, sodium phosphates, sodium chloride 0.9%  No current facility-administered medications on file prior to encounter.     Current Outpatient Medications on File Prior to Encounter  "  Medication Sig Dispense Refill    ciclopirox 0.77 % Gel Apply topically 2 (two) times daily.      dabigatran etexilate (PRADAXA) 75 mg Cap TAKE 1 CAPSULE(75 MG) BY MOUTH TWICE DAILY 60 capsule 2    ergocalciferol (ERGOCALCIFEROL) 50,000 unit Cap Take 1,250 mcg by mouth once daily.      ezetimibe (ZETIA) 10 mg tablet Take 10 mg by mouth every evening.      olmesartan-amLODIPin-hcthiazid 40-10-12.5 mg Tab Take 1 tablet by mouth once daily.      alcohol antiseptic pads (ALCOHOL PADS TOP) USE TWICE DAILY PRIOR TO FINGER STICK      alcohol antiseptic pads (ALCOHOL SWABS TOP)       amLODIPine (NORVASC) 10 MG tablet Take 10 mg by mouth once daily.      aspirin (ECOTRIN) 81 MG EC tablet Take 81 mg by mouth once daily.      cloNIDine (CATAPRES) 0.1 MG tablet Take 0.1 mg by mouth 2 (two) times daily.      esomeprazole (NEXIUM) 40 MG capsule Take 1 capsule (40 mg total) by mouth once daily. 30 capsule 2    traMADoL (ULTRAM) 50 mg tablet Take 50 mg by mouth 3 (three) times daily as needed.      TRAVATAN Z 0.004 % Drop 1 drop 2 (two) times daily.          Review of Systems:  Neg    Physical Exam:  /78 (BP Location: Left arm, Patient Position: Lying)   Pulse 85   Temp 98.7 °F (37.1 °C) (Oral)   Resp 16   Ht 5' 2" (1.575 m)   Wt 65.8 kg (145 lb)   SpO2 98%   BMI 26.52 kg/m²     General Appearance:    NAD, AAO x 3, cooperative, appears stated age   Head:    Normocephalic, atraumatic   Eyes:    PER, EOMI, and conjunctiva/sclera clear bilaterally        Mouth:   Moist mucus membranes, no thrush or oral lesions, normal      dentition   Back:     No CVA tenderness   Lungs:     Clear to auscultation bilaterally, no wheeze, crackles, rales      or rhonchi, symmetric air movement, respirations unlabored   Heart:    Regular rate and rhythm, S1 and S2 normal, no murmur, rub   or gallop   Abdomen:     Soft, non-tender, non-distended, bowel sounds active all four   quadrants, no RT or guarding, no masses, no organomegaly "   Extremities:   Warm and well perfused, distal pulses intact, no cyanosis or    peripheral edema   MSK:   No joint or muscle swelling, tenderness or deformity   Skin:   Skin color, texture, turgor normal, no rashes or lesions   Neurologic/Psychiatric:   CNII-XII intact, normal strength and sensation       throughout, no asterixis; normal affect, memory, judgement     and insight     Results:  Lab Results   Component Value Date     12/03/2023    K 4.5 12/03/2023     (H) 12/03/2023    CO2 23 12/03/2023    BUN 21 12/03/2023    CREATININE 1.4 12/03/2023    CALCIUM 9.3 12/03/2023    ANIONGAP 4 (L) 12/03/2023    ESTGFRAFRICA 36.9 (A) 02/18/2022    EGFRNONAA 32.0 (A) 02/18/2022       Lab Results   Component Value Date    CALCIUM 9.3 12/03/2023    PHOS 2.3 (L) 12/03/2023       Recent Labs   Lab 12/03/23  0350   WBC 7.23   RBC 4.33   HGB 11.6*   HCT 37.1      MCV 86   MCH 26.8*   MCHC 31.3*         I have personally reviewed pertinent radiological imaging and reports.    I have spent > 35 minutes providing care for this patient for the above diagnoses. These services have included chart/data/imaging review, evaluation, exam, formulation of plan, , note preparation, and discussions with staff involved in this patient's care.    Gregorio Wilkerson MD MPH  [facility] [address] [address]  [phone] (p)  [phone] (f)

## 2023-12-03 NOTE — PLAN OF CARE
[facility]  Discharge Final Note    Primary Care Provider: Juancho Graff MD    Expected Discharge Date: 12/3/2023    SW reviewed discharge orders. Patient plans to discharge home, and family will provide transportation. No case management needs.     Final Discharge Note (most recent)       Final Note - 12/03/23 1102          Final Note    Assessment Type Final Discharge Note     Anticipated Discharge Disposition Home or Self Care        Post-Acute Status    Coverage [employer] [facility] PPO     Discharge Delays None known at this time                     Important Message from Medicare

## 2023-12-03 NOTE — PT/OT/SLP PROGRESS
"Physical Therapy Treatment    Patient Name:  Azul Clifford   MRN:  [id number]    Recommendations:     Discharge Recommendations: Low Intensity Therapy (vs no therapy depending on progress)  Discharge Equipment Recommendations: walker, rolling  Barriers to discharge:  weakness, impaired self care skills, impaired functional mobility    Assessment:     Azul Clifford is a 83 y.o. female admitted with a medical diagnosis of UTI (urinary tract infection).  She presents with the following impairments/functional limitations: impaired endurance, gait instability, impaired self care skills.    Pt found resting with HOB elevated, extender checking BG at bedside. Agreeable to skilled physical therapy today with some encouragement. BG noted to be 80 and recommended pt drink OJ prior to ambulation. Pt performed BM and sit to stand transfer with CGA. Upon standing, she stated "I think I need to pee." Pt ambulated 15' to bathroom with RW and CGA and transferred to sitting with SBA. She stood with SBA from toilet and stood with RW to change wet gown. She ambulated 360ft with RW and CGA without any complaints and with appropriate technique. SBA given to transfer back to supine with HOB elevated, call light within reach, bed alarm on, all needs met.     Rehab Prognosis: Fair; patient would benefit from acute skilled PT services to address these deficits and reach maximum level of function.    Recent Surgery: * No surgery found *      Plan:     During this hospitalization, patient to be seen daily to address the identified rehab impairments via gait training, therapeutic activities, therapeutic exercises and progress toward the following goals:    Plan of Care Expires:  01/02/24    Subjective     Chief Complaint: "my gown is wet"  Patient/Family Comments/goals: to get better and go home  Pain/Comfort:  Pain Rating 1: 0/10      Objective:     Communicated with RN prior to session.  Patient found HOB elevated with bed alarm, peripheral " IV, telemetry upon PT entry to room.     General Precautions: Standard, fall  Orthopedic Precautions: N/A  Braces: N/A  Respiratory Status: Room air     Functional Mobility:  Bed Mobility:     Supine to Sit: contact guard assistance  Sit to Supine: stand by assistance  Transfers:     Sit to Stand:  contact guard assistance with rolling walker  Toilet Transfer: contact guard assistance with  rolling walker and grab bars  using  Step Transfer  Gait: 360' with RW and CGA.       AM-PAC 6 CLICK MOBILITY          Treatment & Education:  Pt educated on importance of time OOB, importance of intermittent mobility, safe techniques for transfers/ambulation, discharge recommendations/options, and use of call light for assistance and fall prevention.      Patient left HOB elevated with all lines intact, call button in reach, bed alarm on, and RN notified..    GOALS:   Multidisciplinary Problems       Physical Therapy Goals          Problem: Physical Therapy    Goal Priority Disciplines Outcome Goal Variances Interventions   Physical Therapy Goal     PT, PT/OT      Description: Goals to be met by: 24     Patient will increase functional independence with mobility by performin. Supine to sit with Supervision  2. Sit to stand transfer with Supervision  3. Bed to chair transfer with Supervision   4. Gait  x 250 feet with Supervision .                              Time Tracking:     PT Received On: 23  PT Start Time: 1108     PT Stop Time: 1131  PT Total Time (min): 23 min     Billable Minutes: Gait Training 15 and Therapeutic Activity 8    Treatment Type: Treatment  PT/PTA: PTA     Number of PTA visits since last PT visit: 2023

## 2023-12-03 NOTE — NURSING
Pt up in chair for breakfast after bathroom. Call button within reach. AAOX4, pt stated that she did urinate in the toilet but did not go in the hat,  No other needs at this time. Will continue to monitor.    0845 pt assisted back to bed, bed alarm activated.    1100 Pt's daughter notified by phone about discharge orders. Stated she would be here in 30-60 minutes.     1230 pt's daughter verbalized understanding of discharge instructions. Spoke with me and Micki Keane NP. IV and tele box removed. Box returned to [facility]. Pt wheeled down with all personal belongings to private vehicle.

## 2023-12-04 LAB — BACTERIA UR CULT: NO GROWTH

## 2023-12-06 LAB
BACTERIA BLD CULT: NORMAL
BACTERIA BLD CULT: NORMAL

## 2024-01-05 DIAGNOSIS — I26.99 BILATERAL PULMONARY EMBOLISM: ICD-10-CM

## 2024-01-05 RX ORDER — DABIGATRAN ETEXILATE 75 MG/1
CAPSULE ORAL
Qty: 60 CAPSULE | Refills: 2 | Status: SHIPPED | OUTPATIENT
Start: 2024-01-05

## 2024-03-04 PROBLEM — N39.0 UTI (URINARY TRACT INFECTION): Status: RESOLVED | Noted: 2023-12-01 | Resolved: 2024-03-04

## 2024-03-04 PROBLEM — N17.9 AKI (ACUTE KIDNEY INJURY): Status: RESOLVED | Noted: 2023-12-01 | Resolved: 2024-03-04

## 2024-05-29 ENCOUNTER — TELEPHONE (OUTPATIENT)
Facility: CLINIC | Age: 84
End: 2024-05-29
Payer: MEDICARE

## 2024-05-29 DIAGNOSIS — I26.99 BILATERAL PULMONARY EMBOLISM: ICD-10-CM

## 2024-05-29 RX ORDER — DABIGATRAN ETEXILATE 75 MG/1
CAPSULE ORAL
Qty: 60 CAPSULE | Refills: 2 | Status: SHIPPED | OUTPATIENT
Start: 2024-05-29

## 2024-05-29 NOTE — TELEPHONE ENCOUNTER
----- Message from Yadira Shafer sent at 5/29/2024  2:44 PM CDT -----  Pt's dtr Jeaneth requesting refill on Pradaxa 75 mg, please send to [facility] [address]     Cb [phone]

## 2024-10-08 DIAGNOSIS — I10 ESSENTIAL HYPERTENSION, MALIGNANT: ICD-10-CM

## 2024-10-08 DIAGNOSIS — I71.20 ANEURYSM, AORTA, THORACIC: Primary | ICD-10-CM

## 2024-11-21 ENCOUNTER — HOSPITAL ENCOUNTER (EMERGENCY)
Facility: HOSPITAL | Age: 84
Discharge: HOME | End: 2024-11-22
Attending: STUDENT IN AN ORGANIZED HEALTH CARE EDUCATION/TRAINING PROGRAM
Payer: MEDICARE

## 2024-11-21 DIAGNOSIS — I71.40 ABDOMINAL AORTIC ANEURYSM (AAA) WITHOUT RUPTURE, UNSPECIFIED PART: ICD-10-CM

## 2024-11-21 DIAGNOSIS — K59.00 CONSTIPATION, UNSPECIFIED CONSTIPATION TYPE: ICD-10-CM

## 2024-11-21 DIAGNOSIS — K56.41 FECAL IMPACTION: Primary | ICD-10-CM

## 2024-11-21 LAB
ALBUMIN SERPL BCP-MCNC: 4.3 G/DL (ref 3.5–5.2)
ALP SERPL-CCNC: 145 U/L (ref 55–135)
ALT SERPL W/O P-5'-P-CCNC: 10 U/L (ref 10–44)
ANION GAP SERPL CALC-SCNC: 9 MMOL/L (ref 8–16)
AST SERPL-CCNC: 17 U/L (ref 10–40)
BASOPHILS # BLD AUTO: 0.09 K/UL (ref 0–0.2)
BASOPHILS NFR BLD: 1 % (ref 0–1.9)
BILIRUB SERPL-MCNC: 0.5 MG/DL (ref 0.1–1)
BUN SERPL-MCNC: 19 MG/DL (ref 8–23)
CALCIUM SERPL-MCNC: 10.4 MG/DL (ref 8.7–10.5)
CHLORIDE SERPL-SCNC: 106 MMOL/L (ref 95–110)
CO2 SERPL-SCNC: 26 MMOL/L (ref 23–29)
CREAT SERPL-MCNC: 1.2 MG/DL (ref 0.5–1.4)
DIFFERENTIAL METHOD BLD: ABNORMAL
EOSINOPHIL # BLD AUTO: 0.1 K/UL (ref 0–0.5)
EOSINOPHIL NFR BLD: 1.5 % (ref 0–8)
ERYTHROCYTE [DISTWIDTH] IN BLOOD BY AUTOMATED COUNT: 15.1 % (ref 11.5–14.5)
EST. GFR  (NO RACE VARIABLE): 44.1 ML/MIN/1.73 M^2
GLUCOSE SERPL-MCNC: 121 MG/DL (ref 70–110)
HCT VFR BLD AUTO: 45.8 % (ref 37–48.5)
HCV AB SERPL QL IA: NEGATIVE
HGB BLD-MCNC: 14.4 G/DL (ref 12–16)
HIV 1+2 AB+HIV1 P24 AG SERPL QL IA: NEGATIVE
IMM GRANULOCYTES # BLD AUTO: 0.03 K/UL (ref 0–0.04)
IMM GRANULOCYTES NFR BLD AUTO: 0.3 % (ref 0–0.5)
LIPASE SERPL-CCNC: 65 U/L (ref 4–60)
LYMPHOCYTES # BLD AUTO: 1.6 K/UL (ref 1–4.8)
LYMPHOCYTES NFR BLD: 18.6 % (ref 18–48)
MCH RBC QN AUTO: 25.3 PG (ref 27–31)
MCHC RBC AUTO-ENTMCNC: 31.4 G/DL (ref 32–36)
MCV RBC AUTO: 81 FL (ref 82–98)
MONOCYTES # BLD AUTO: 0.5 K/UL (ref 0.3–1)
MONOCYTES NFR BLD: 6.1 % (ref 4–15)
NEUTROPHILS # BLD AUTO: 6.3 K/UL (ref 1.8–7.7)
NEUTROPHILS NFR BLD: 72.5 % (ref 38–73)
NRBC BLD-RTO: 0 /100 WBC
PLATELET # BLD AUTO: 274 K/UL (ref 150–450)
PMV BLD AUTO: 9.4 FL (ref 9.2–12.9)
POTASSIUM SERPL-SCNC: 4.2 MMOL/L (ref 3.5–5.1)
PROT SERPL-MCNC: 8.1 G/DL (ref 6–8.4)
RBC # BLD AUTO: 5.69 M/UL (ref 4–5.4)
SODIUM SERPL-SCNC: 141 MMOL/L (ref 136–145)
WBC # BLD AUTO: 8.69 K/UL (ref 3.9–12.7)

## 2024-11-21 PROCEDURE — 83690 ASSAY OF LIPASE: CPT | Performed by: PHYSICIAN ASSISTANT

## 2024-11-21 PROCEDURE — 25000003 PHARM REV CODE 250: Performed by: EMERGENCY MEDICINE

## 2024-11-21 PROCEDURE — 87389 HIV-1 AG W/HIV-1&-2 AB AG IA: CPT | Performed by: EMERGENCY MEDICINE

## 2024-11-21 PROCEDURE — 99285 EMERGENCY DEPT VISIT HI MDM: CPT | Mod: 25

## 2024-11-21 PROCEDURE — 25000003 PHARM REV CODE 250: Performed by: STUDENT IN AN ORGANIZED HEALTH CARE EDUCATION/TRAINING PROGRAM

## 2024-11-21 PROCEDURE — 86803 HEPATITIS C AB TEST: CPT | Performed by: EMERGENCY MEDICINE

## 2024-11-21 PROCEDURE — 96374 THER/PROPH/DIAG INJ IV PUSH: CPT

## 2024-11-21 PROCEDURE — 80053 COMPREHEN METABOLIC PANEL: CPT | Performed by: PHYSICIAN ASSISTANT

## 2024-11-21 PROCEDURE — 85025 COMPLETE CBC W/AUTO DIFF WBC: CPT | Performed by: PHYSICIAN ASSISTANT

## 2024-11-21 PROCEDURE — 63600175 PHARM REV CODE 636 W HCPCS: Performed by: EMERGENCY MEDICINE

## 2024-11-21 RX ORDER — SYRING-NEEDL,DISP,INSUL,0.3 ML 29 G X1/2"
296 SYRINGE, EMPTY DISPOSABLE MISCELLANEOUS
Status: COMPLETED | OUTPATIENT
Start: 2024-11-21 | End: 2024-11-21

## 2024-11-21 RX ORDER — MORPHINE SULFATE 4 MG/ML
4 INJECTION, SOLUTION INTRAMUSCULAR; INTRAVENOUS
Status: COMPLETED | OUTPATIENT
Start: 2024-11-21 | End: 2024-11-21

## 2024-11-21 RX ORDER — PSEUDOEPHEDRINE/ACETAMINOPHEN 30MG-500MG
100 TABLET ORAL
Status: DISCONTINUED | OUTPATIENT
Start: 2024-11-21 | End: 2024-11-22 | Stop reason: HOSPADM

## 2024-11-21 RX ORDER — POLYETHYLENE GLYCOL 3350 17 G/17G
17 POWDER, FOR SOLUTION ORAL DAILY
Qty: 238 G | Refills: 0 | Status: SHIPPED | OUTPATIENT
Start: 2024-11-21 | End: 2024-11-22

## 2024-11-21 RX ADMIN — MAGNESIUM CITRATE 296 ML: 1.75 LIQUID ORAL at 11:11

## 2024-11-21 RX ADMIN — MORPHINE SULFATE 4 MG: 4 INJECTION, SOLUTION INTRAMUSCULAR; INTRAVENOUS at 11:11

## 2024-11-21 RX ADMIN — SODIUM CHLORIDE 1000 ML: 9 INJECTION, SOLUTION INTRAVENOUS at 11:11

## 2024-11-21 RX ADMIN — MAGESIUM CITRATE 296 ML: 1.75 LIQUID ORAL at 05:11

## 2024-11-21 RX ADMIN — SODIUM CHLORIDE 500 ML: 0.9 INJECTION, SOLUTION INTRAVENOUS at 11:11

## 2024-11-21 RX ADMIN — SODIUM CHLORIDE 500 ML: 0.9 INJECTION, SOLUTION INTRAVENOUS at 05:11

## 2024-11-21 NOTE — FIRST PROVIDER EVALUATION
" Emergency Department TeleTriage Encounter Note      CHIEF COMPLAINT    Chief Complaint   Patient presents with    Abdominal Pain     Constipation x 2-3 weeks, radiates from left side of abdomen to chest x 2 days       VITAL SIGNS   Initial Vitals [11/21/24 1447]   BP Pulse Resp Temp SpO2   (!) 144/90 99 18 97.6 °F (36.4 °C) 98 %      MAP       --            ALLERGIES    Review of patient's allergies indicates:  No Known Allergies    PROVIDER TRIAGE NOTE  This is a teletriage evaluation of a 84 y.o. female presenting to the ED complaining of abdominal pain. Patient reports constipation for the last 2-3 weeks. She has been taking OTC medications to help with BM, "two little pink pills from the dollar store." She denies nausea or vomiting. She reports pain from her pelvis to the lower part of her chest.    Patient is alert and oriented. She speaks in complete sentences. She is sitting upright in the chair in no distress.     Initial orders will be placed and care will be transferred to an alternate provider when patient is roomed for a full evaluation. Any additional orders and the final disposition will be determined by that provider.         ORDERS  Labs Reviewed   HEPATITIS C ANTIBODY   HIV 1 / 2 ANTIBODY   CBC W/ AUTO DIFFERENTIAL   COMPREHENSIVE METABOLIC PANEL   LIPASE   URINALYSIS, REFLEX TO URINE CULTURE       ED Orders (720h ago, onward)      Start Ordered     Status Ordering Provider    11/21/24 1507 11/21/24 1506  Vital signs  Every 2 hours         Ordered NATAN, PRANAY G.    11/21/24 1506 11/21/24 1506  Diet NPO  Diet effective now         Ordered NATAN, PRANAY G.    11/21/24 1506 11/21/24 1506  Insert peripheral IV  Once         Ordered NATAN, PRANAY G.    11/21/24 1506 11/21/24 1506  CBC W/ AUTO DIFFERENTIAL  STAT         Ordered NATAN, PRANAY G.    11/21/24 1506 11/21/24 1506  Comp. Metabolic Panel  STAT         Ordered NATAN, PRANAY G.    11/21/24 1506 11/21/24 1506  Lipase  STAT         Ordered NATAN, " PRANAY MCRAE.    11/21/24 1506 11/21/24 1506  Urinalysis, Reflex to Urine Culture Urine, Clean Catch  STAT         Ordered NATANPRANAY.    11/21/24 1506 11/21/24 1506  CT Abdomen Pelvis  Without Contrast  1 time imaging         Ordered PRANAY GAMA.    11/21/24 1451 11/21/24 1450  Hepatitis C Antibody  STAT         Ordered RINA ALMANZAR    11/21/24 1451 11/21/24 1450  HIV 1/2 Ag/Ab (4th Gen)  STAT         Ordered RINA ALMANZAR              Virtual Visit Note: The provider triage portion of this emergency department evaluation and documentation was performed via [employer], a HIPAA-compliant telemedicine application, in concert with a tele-presenter in the room. A face to face patient evaluation with one of my colleagues will occur once the patient is placed in an emergency department room.      DISCLAIMER: This note was prepared with [employer] voice recognition transcription software. Garbled syntax, mangled pronouns, and other bizarre constructions may be attributed to that software system.

## 2024-11-21 NOTE — ED PROVIDER NOTES
Encounter Date: 11/21/2024       History     Chief Complaint   Patient presents with    Abdominal Pain     Constipation x 2-3 weeks, radiates from left side of abdomen to chest x 2 days     HPI    Azul Clifford is a 84 y.o. female with a past medical history AAA currently following with vascular surgery, hypertension, hyperlipidemia, and type 2 diabetes that presents emergency department for evaluation of left lower quadrant abdominal pain.  Patient was significant constipation with last bowel movement being over 1 week ago.  She is still passing gas.  No change in appetite.  Not having any nausea or vomiting.  Denies any fevers.    Review of patient's allergies indicates:  No Known Allergies  Past Medical History:   Diagnosis Date    Arthritis     DM2 (diabetes mellitus, type 2)     Glaucoma capsular     HLD (hyperlipidemia)     Hypertension     Kidney stones     Skin lesion of breast      Past Surgical History:   Procedure Laterality Date    BREAST SURGERY      removed lump to right breast     EYE SURGERY      URETERAL STENT PLACEMENT      and removed     Family History   Problem Relation Name Age of Onset    Coronary artery disease Mother      Diabetes Mother       Social History     Tobacco Use    Smoking status: Former    Smokeless tobacco: Never   Substance Use Topics    Alcohol use: No    Drug use: No     Review of Systems   Constitutional:  Negative for fever.   HENT:  Negative for sore throat.    Respiratory:  Negative for cough and shortness of breath.    Cardiovascular:  Negative for chest pain.   Gastrointestinal:  Positive for abdominal pain and constipation. Negative for diarrhea, nausea and vomiting.   Genitourinary:  Negative for dysuria, frequency and hematuria.   Musculoskeletal:  Negative for back pain and neck pain.   Skin:  Negative for rash.   Neurological:  Negative for dizziness, weakness, numbness and headaches.   Hematological:  Does not bruise/bleed easily.       Physical Exam     Initial  Vitals [11/21/24 1447]   BP Pulse Resp Temp SpO2   (!) 144/90 99 18 97.6 °F (36.4 °C) 98 %      MAP       --         Physical Exam    Nursing note and vitals reviewed.  Constitutional: She appears well-developed and well-nourished.   HENT:   Head: Normocephalic and atraumatic.   Eyes: EOM are normal. Pupils are equal, round, and reactive to light.   Neck:   Normal range of motion.  Cardiovascular:  Normal rate, regular rhythm and normal heart sounds.           Pulmonary/Chest: Breath sounds normal. No respiratory distress. She has no wheezes. She has no rhonchi. She has no rales.   Abdominal: Abdomen is soft. She exhibits no distension. There is abdominal tenderness (Left-sided abdominal pain). There is no rebound.   Genitourinary:    Genitourinary Comments: Rectal exam without any gross blood.  Attempted to disimpact, but stool ball is beyond reach.     Musculoskeletal:         General: Normal range of motion.      Cervical back: Normal range of motion.     Neurological: She is alert and oriented to person, place, and time. She has normal strength. No cranial nerve deficit or sensory deficit. GCS score is 15. GCS eye subscore is 4. GCS verbal subscore is 5. GCS motor subscore is 6.   Skin: Capillary refill takes less than 2 seconds. No rash noted.   Psychiatric: She has a normal mood and affect. Thought content normal.         ED Course   Procedures  Labs Reviewed   CBC W/ AUTO DIFFERENTIAL - Abnormal       Result Value    WBC 8.69      RBC 5.69 (*)     Hemoglobin 14.4      Hematocrit 45.8      MCV 81 (*)     MCH 25.3 (*)     MCHC 31.4 (*)     RDW 15.1 (*)     Platelets 274      MPV 9.4      Immature Granulocytes 0.3      Gran # (ANC) 6.3      Immature Grans (Abs) 0.03      Lymph # 1.6      Mono # 0.5      Eos # 0.1      Baso # 0.09      nRBC 0      Gran % 72.5      Lymph % 18.6      Mono % 6.1      Eosinophil % 1.5      Basophil % 1.0      Differential Method Automated      Narrative:     Release to  patient->Immediate   COMPREHENSIVE METABOLIC PANEL - Abnormal    Sodium 141      Potassium 4.2      Chloride 106      CO2 26      Glucose 121 (*)     BUN 19      Creatinine 1.2      Calcium 10.4      Total Protein 8.1      Albumin 4.3      Total Bilirubin 0.5      Alkaline Phosphatase 145 (*)     AST 17      ALT 10      eGFR 44.1 (*)     Anion Gap 9      Narrative:     Release to patient->Immediate   LIPASE - Abnormal    Lipase 65 (*)     Narrative:     Release to patient->Immediate   HEPATITIS C ANTIBODY    Hepatitis C Ab Negative      Narrative:     Release to patient->Immediate   HIV 1 / 2 ANTIBODY    HIV 1/2 Ag/Ab Negative      Narrative:     Release to patient->Immediate          Imaging Results               CT Abdomen Pelvis  Without Contrast (Final result)  Result time 11/21/24 16:19:44      Final result by Kahlil Donahue MD (11/21/24 16:19:44)                   Impression:      1.  Prominent stool ball seen in the rectal vault, consider correlation for fecal impaction and/or constipation.    2.  Moderate volume of stool and gas in the proximal colon, consider correlation for constipation.    3.  Infrarenal abdominal aortic aneurysm measuring up to 4.6 x 4.1 cm, previously 4.2 x 3.8 cm (lack of IV contrast and overlying bowel may decrease sensitivity for size measurements).    4.  Cholelithiasis without finding of acute cholecystitis.    5.  Nonobstructing stone in the upper pole the left kidney measuring 1 mm.    6. Numerous additional, and incidental findings as noted above.    This report was flagged in Epic as abnormal.    FUSIFORM AORTIC ANEURYSM RECOMMENDATION:  AAA Size:                          Follow-up Recommendation :    2.6-2.9 cm                        Every 5 years  3.0-3.4 cm                        Every 3 years  3.5-3.9 cm                        Every 2 years  4.0-4.4 cm                        Every 12 months, vascular consultation recommended  4.5-5.4 cm                        Every 6  months, vascular consultation recommended  >5.5 cm                             Vascular surgery consultation recommended    1. These recommendations are based on the ACR white paper: J Am Etienne Radiol 2013;10:789-794  2. For aortas with maximum diameter of 2.6 cm and below,  no follow-up  3. Saccular AAA of any size:  Recommend vascular consult.  4. Enlarging AAA > 0.5 cm in 6 mos or > 1cm in 1 year:  recommend vascular consult      Electronically signed by: Kahlil Donahue  Date:    11/21/2024  Time:    16:19               Narrative:      CMS MANDATED QUALITY DATA - CT RADIATION - 436    All CT scans at this facility utilize dose modulation, iterative reconstruction, and/or weight based dosing when appropriate to reduce radiation dose to as low as reasonably achievable.    CLINICAL HISTORY:  ([id number])83 y/o  (1940) F    Abdominal pain, acute, nonlocalized;    TECHNIQUE:  (A#[id number], exam time 11/21/2024 16:10)    CT ABDOMEN PELVIS WITHOUT CONTRAST [id number]    Axial CT images of the abdomen and pelvis were obtained from the dome of the diaphragm to the proximal thighs.    COMPARISON:  None available.    FINDINGS:  Lower Thorax:    The lung bases are clear.  The heart is mildly enlarged in size with dense coronary artery calcifications present.  No pleural or pericardial effusion is seen.  The descending thoracic aorta is somewhat tortuous, incompletely assessed on this non targeted exam.    CT Abdomen:    Liver: The liver is normal in size and imaging appearance.    Gallbladder: There are stones seen in the gallbladder without wall thickening or pericholecystic fluid to suggest acute cholecystitis.    Biliary Tree: No intra or extrahepatic ductal dilation.    Spleen: Normal.    Pancreas: The pancreas is normal.    Adrenal Glands: There is an incidental left adrenal myelolipoma measuring 16 mm in diameter.    Kidneys: No hydronephrosis/hydroureter or evidence of obstructive uropathy.  There is a 1 mm  nonobstructing upper pole left renal stone (image 68).  There is a 3.8 cm and 5.2 cm simple cyst in the upper pole of the right kidney, with additional 2.1 cm simple cyst.    Vasculature: Calcified plaques are present the abdominal aorta and its major branch vessels, there is aneurysmal dilation of the infrarenal abdominal aorta measuring approximately 4.1 x 4.6 cm (AP X TR) (image 121), previously measuring 4.2 x 3.8 cm.    Lymph nodes: No abdominal lymphadenopathy is seen.    Intraperitoneal structures: There is no ascites.    Bowel: Prominent stool ball is seen in the rectal vault.  There is a moderate volume of stool and gas in the ascending and transverse colon.    Abdominal wall: Small fat containing umbilical hernia and small fat containing left inguinal hernia.    Musculoskeletal: Grade 1 anterolisthesis of L4 on L5 with severe bilateral facet arthropathy.  Scattered degenerative changes present in the remainder of the lumbar spine.    CT Pelvis:    Bladder: Normal.    Reproductive Organs: Unremarkable.    Pelvic Lymph nodes: No lymphadenopathy.                                       Medications   glycerin 99.5% topical solution 100 mL (100 mLs Rectal Not Given 11/21/24 1745)     And   magnesium citrate solution 296 mL (296 mLs Rectal Given 11/21/24 1745)     And   sodium chloride 0.9% bolus 500 mL 500 mL (0 mLs Rectal Stopped 11/21/24 2201)   glycerin 99.5% topical solution 100 mL (100 mLs Rectal Not Given 11/21/24 2300)     And   magnesium citrate solution 296 mL (296 mLs Rectal Given 11/21/24 2300)     And   sodium chloride 0.9% bolus 500 mL 500 mL (500 mLs Rectal New Bag 11/21/24 2300)   morphine injection 4 mg (4 mg Intravenous Given 11/21/24 2304)   sodium chloride 0.9% bolus 1,000 mL 1,000 mL (1,000 mLs Intravenous New Bag 11/21/24 2304)     Medical Decision Making  Azul Clifford is a 84 y.o. female with a past medical history AAA currently following with vascular surgery, hypertension,  hyperlipidemia, and type 2 diabetes that presents emergency department for evaluation of left lower quadrant abdominal pain.  Vitals stable.  Nontoxic female.  Mild left sided abdominal tenderness without peritoneal signs.  Presentation concerning for constipation versus diverticulitis versus fecal impaction.  CMP is unremarkable and CBC does not have leukocytosis.  Lipase only mildly elevated, and patient does not have any urinary symptoms at this time.  Low suspicion for pancreatitis or UTI/pyelonephritis.  Obtain a CT of the abdomen which showed what appeared to be a fecal impaction.  Attempted to manually disimpact, but this was unsuccessful.  We will give enema and assuming she feels improved, discharged home.  Disposition pending enema.    Amount and/or Complexity of Data Reviewed  Labs: ordered. Decision-making details documented in ED Course.  Radiology: ordered. Decision-making details documented in ED Course.  Discussion of management or test interpretation with external provider(s): Patient did not tolerate enema and nurse could not do it and patient at this point is saying she does not want an enema and will go home and drink laxative and patient said it worked in the past and will try that.  Patient otherwise nontoxic.  Attempted to do enema.  Will discharge patient with magnesium citrate and advised to use fleets enemas which may be less invasive for patient.  Discharged with return precautions and instructions and follow-up patient has follow-up with vascular surgeon for abdominal aortic aneurysm which is stable    Risk  OTC drugs.  Prescription drug management.                                      Clinical Impression:  Final diagnoses:  [K56.41] Fecal impaction (Primary)  [K59.00] Constipation, unspecified constipation type  [I71.40] Abdominal aortic aneurysm (AAA) without rupture, unspecified part          ED Disposition Condition    Discharge Stable          ED Prescriptions       Medication Sig  Dispense Start Date End Date Auth. Provider    polyethylene glycol (GLYCOLAX) 17 gram/dose powder  (Status: Discontinued) Take 17 g by mouth once daily. for 14 days 238 g 11/21/2024 11/22/2024 William Montes MD    polyethylene glycol (GLYCOLAX) 17 gram/dose powder Take 17 g by mouth once daily. for 14 days 238 g 11/22/2024 12/6/2024 Alex Yin MD    magnesium citrate solution (Expires today) Take 296 mLs by mouth once. for 1 dose 296 mL 11/22/2024 11/22/2024 Alex Yin MD    glycerin pediatric suppository Place 1 suppository rectally as needed for Constipation. 12 suppository 11/22/2024 11/27/2024 Alex Yin MD    sodium phosphates (FLEET ENEMA EXTRA) 19-7 gram/197 mL Enem Place 1 Application rectally as needed. 230 mL 11/22/2024 12/2/2024 Alex Yin MD    docusate sodium (COLACE) 100 MG capsule Take 1 capsule (100 mg total) by mouth 3 (three) times daily as needed. 60 capsule 11/22/2024 -- Alex Yin MD          Follow-up Information       Follow up With Specialties Details Why Contact Info Additional Information    [facility] - Emergency Dept Emergency Medicine  If symptoms worsen, As needed [address] [facility] [id number] [phone] 1st floor    Juancho Graff MD Internal Medicine Call in 1 day Follow-up on ED visit [address] [place] [address] [address]  [facility] [id number]  [phone]                Alex Yin MD  11/22/24 0113

## 2024-11-22 VITALS
HEART RATE: 101 BPM | BODY MASS INDEX: 26.68 KG/M2 | DIASTOLIC BLOOD PRESSURE: 78 MMHG | TEMPERATURE: 99 F | OXYGEN SATURATION: 99 % | WEIGHT: 145 LBS | HEIGHT: 62 IN | RESPIRATION RATE: 18 BRPM | SYSTOLIC BLOOD PRESSURE: 158 MMHG

## 2024-11-22 DIAGNOSIS — I26.99 BILATERAL PULMONARY EMBOLISM: ICD-10-CM

## 2024-11-22 RX ORDER — DABIGATRAN ETEXILATE 75 MG/1
CAPSULE ORAL
Qty: 60 CAPSULE | Refills: 2 | Status: CANCELLED | OUTPATIENT
Start: 2024-11-22

## 2024-11-22 RX ORDER — DABIGATRAN ETEXILATE 75 MG/1
CAPSULE ORAL
Qty: 60 CAPSULE | Refills: 0 | Status: SHIPPED | OUTPATIENT
Start: 2024-11-22

## 2024-11-22 RX ORDER — SYRING-NEEDL,DISP,INSUL,0.3 ML 29 G X1/2"
296 SYRINGE, EMPTY DISPOSABLE MISCELLANEOUS ONCE
Qty: 296 ML | Refills: 0 | Status: SHIPPED | OUTPATIENT
Start: 2024-11-22 | End: 2024-11-22

## 2024-11-22 RX ORDER — GLYCERIN 1 G/1
1 SUPPOSITORY RECTAL
Qty: 12 SUPPOSITORY | Refills: 0 | Status: SHIPPED | OUTPATIENT
Start: 2024-11-22 | End: 2024-11-27

## 2024-11-22 RX ORDER — SODIUM PHOSPHATE,MONO-DIBASIC 19G-7G/197
1 ENEMA (ML) RECTAL
Qty: 230 ML | Refills: 3 | Status: SHIPPED | OUTPATIENT
Start: 2024-11-22 | End: 2024-12-02

## 2024-11-22 RX ORDER — POLYETHYLENE GLYCOL 3350 17 G/17G
17 POWDER, FOR SOLUTION ORAL DAILY
Qty: 238 G | Refills: 0 | Status: SHIPPED | OUTPATIENT
Start: 2024-11-22 | End: 2024-12-06

## 2024-11-22 RX ORDER — DOCUSATE SODIUM 100 MG/1
100 CAPSULE, LIQUID FILLED ORAL 3 TIMES DAILY PRN
Qty: 60 CAPSULE | Refills: 0 | Status: SHIPPED | OUTPATIENT
Start: 2024-11-22

## 2024-11-22 NOTE — TELEPHONE ENCOUNTER
----- Message from Edith sent at 11/22/2024 11:39 AM CST -----  Pt's daughter, Jeaneth calling to get a prescription refilled. Pt has only two days left of prescription. dabigatran etexilate (PRADAXA) 75 mg Cap. Pt uses [facility] on [address]   Pt also hasn't been seen since 05/02/23 and there no future appts., scheduled.     # [phone]

## 2024-11-22 NOTE — ED NOTES
Attempted soap suds enema/mag/NS enema. Pt would not tolerate it, couldn't get tube into rectum far enough to let fluids infuse. Dr Yin called to bedside. Pt does not want to do this enema, states she would go home and do mag citrate orally.  He gave verbal for fleet enema. Pt agreed.  Pt somewhat tolerated the fleet enema and can hold in some of the fluid. Bedside commode at beside

## 2024-11-22 NOTE — TELEPHONE ENCOUNTER
----- Message from Edith sent at 11/22/2024 11:35 AM CST -----  Pt's daughter calling to get the prescription of:dabigatran etexilate (PRADAXA) 75 mg Cap, refilled. Pt uses the [facility] on [address] .     CB# [phone]

## 2024-11-22 NOTE — TELEPHONE ENCOUNTER
Patient's daughter made aware, a prescription for Pradaxa 30 day supply will be sent to patients pharmacy and an appointment is required to continue patient's care. Patient's daughter Ms. Eric stated understanding.

## 2024-11-22 NOTE — TELEPHONE ENCOUNTER
Patient has not been seen in this office since 05/02/2023, request will be communicated to provider.

## 2024-11-22 NOTE — ED NOTES
IV not good, attemtped to get another IV twice without success. Waiting on 2nd nurse to get IV access

## 2024-11-22 NOTE — DISCHARGE INSTRUCTIONS
Please return to the emergency department if you have new or worsening symptoms.  Follow up with the primary care doctor.  Begin taking MiraLax once daily for the next 2 weeks.

## 2024-11-23 LAB
OHS QRS DURATION: 72 MS
OHS QTC CALCULATION: 447 MS

## 2024-12-28 NOTE — PROGRESS NOTES
PROGRESS NOTE    Subjective:       Patient ID: Azul Clifford is a 84 y.o. female.    Chief Complaint:  Recurrent DVT/PE follow up    History of Present Illness:   Azul Clifford is a 84 y.o. female who presents for follow up of above.      Ms. Clifford is doing well today.  She continues on the Pradaxa and is tolerating it well.      She denies any s/s of bleeding.    She is having constipation but has not been using her colace or Miralax.    Patient remains on Pradaxa as of today, 12/31/2024    No smoking; No ETOH; No regular exercise.    PMH:  HPI 2/14/2022 Hospital Consult:  Ms. Clifford is a 80yo female admitted to [facility] 2/13/2022 after she was found down and unresponsive at home.  Evidently she was awake and alert in the ER but complaining of sob.  She has a significant history of PE/DVT in April of 2018.  I was able to verify on LE US and V/Q done on 4/14/2018 which showed a RLE DVT and very high prob v/q.   She is now found to have a LLE DVT and int-high prob V/Q and has been started on Lovenox 70mg SC.  She states she is feeling better at this time.   She does not appear to have been on anticoagulants, other than ASA and is unsure why.      Family and Social history reviewed and is unchanged from 2/14/2022-hospital consult note      ROS:  Review of Systems   Constitutional:  Negative for fever.   Respiratory:  Negative for shortness of breath.    Cardiovascular:  Negative for chest pain and leg swelling.   Gastrointestinal:  Negative for abdominal pain and blood in stool.   Genitourinary:  Negative for hematuria.   Skin:  Negative for rash.          Current Outpatient Medications:     alcohol antiseptic pads (ALCOHOL PADS TOP), USE TWICE DAILY PRIOR TO FINGER STICK, Disp: , Rfl:     alcohol antiseptic pads (ALCOHOL SWABS TOP), , Disp: , Rfl:     aspirin (ECOTRIN) 81 MG EC tablet, Take 81 mg by mouth once daily., Disp: , Rfl:     ciclopirox 0.77 % Gel,  "Apply topically 2 (two) times daily., Disp: , Rfl:     cloNIDine (CATAPRES) 0.1 MG tablet, Take 0.1 mg by mouth 2 (two) times daily., Disp: , Rfl:     ergocalciferol (ERGOCALCIFEROL) 50,000 unit Cap, Take 1,250 mcg by mouth once daily., Disp: , Rfl:     esomeprazole (NEXIUM) 40 MG capsule, Take 1 capsule (40 mg total) by mouth once daily., Disp: 30 capsule, Rfl: 2    ezetimibe (ZETIA) 10 mg tablet, Take 10 mg by mouth every evening., Disp: , Rfl:     traMADoL (ULTRAM) 50 mg tablet, Take 50 mg by mouth 3 (three) times daily as needed., Disp: , Rfl:     TRAVATAN Z 0.004 % Drop, 1 drop 2 (two) times daily. , Disp: , Rfl:     dabigatran etexilate (PRADAXA) 75 mg Cap, TAKE 1 CAPSULE(75 MG) BY MOUTH TWICE DAILY, Disp: 180 capsule, Rfl: 3    docusate sodium (COLACE) 100 MG capsule, Take 1 capsule (100 mg total) by mouth 2 (two) times daily., Disp: 180 capsule, Rfl: 3        Objective:       Physical Examination:     BP (!) 154/98 (BP Location: Right arm, Patient Position: Sitting)   Pulse 97   Temp 97.3 °F (36.3 °C) (Temporal)   Ht 5' 2" (1.575 m) Comment: per the pt  Wt 61.8 kg (136 lb 3.2 oz)   SpO2 97%   BMI 24.91 kg/m²     Physical Exam  Constitutional:       Appearance: Normal appearance. She is well-developed.   HENT:      Head: Normocephalic and atraumatic.      Right Ear: External ear normal.      Left Ear: External ear normal.      Nose: Nose normal.      Mouth/Throat:      Mouth: Mucous membranes are moist.   Eyes:      Conjunctiva/sclera: Conjunctivae normal.      Pupils: Pupils are equal, round, and reactive to light.   Neck:      Thyroid: No thyromegaly.      Trachea: No tracheal deviation.   Cardiovascular:      Rate and Rhythm: Normal rate and regular rhythm.      Heart sounds: Normal heart sounds.   Pulmonary:      Effort: Pulmonary effort is normal.      Breath sounds: Normal breath sounds.   Abdominal:      General: Bowel sounds are normal. There is no distension.      Palpations: Abdomen is soft. " There is no mass.      Tenderness: There is no abdominal tenderness.   Skin:     General: Skin is warm and dry.      Findings: No rash.   Neurological:      Mental Status: She is alert.      Comments: Neuro intact througout   Psychiatric:         Behavior: Behavior normal.         Thought Content: Thought content normal.         Judgment: Judgment normal.         Labs:   No results found for this or any previous visit (from the past 2 weeks).  CMP  Sodium   Date Value Ref Range Status   11/21/2024 141 136 - 145 mmol/L Final     Potassium   Date Value Ref Range Status   11/21/2024 4.2 3.5 - 5.1 mmol/L Final     Chloride   Date Value Ref Range Status   11/21/2024 106 95 - 110 mmol/L Final     CO2   Date Value Ref Range Status   11/21/2024 26 23 - 29 mmol/L Final     Glucose   Date Value Ref Range Status   11/21/2024 121 (H) 70 - 110 mg/dL Final     BUN   Date Value Ref Range Status   11/21/2024 19 8 - 23 mg/dL Final     Creatinine   Date Value Ref Range Status   11/21/2024 1.2 0.5 - 1.4 mg/dL Final     Calcium   Date Value Ref Range Status   11/21/2024 10.4 8.7 - 10.5 mg/dL Final     Total Protein   Date Value Ref Range Status   11/21/2024 8.1 6.0 - 8.4 g/dL Final     Albumin   Date Value Ref Range Status   11/21/2024 4.3 3.5 - 5.2 g/dL Final     Total Bilirubin   Date Value Ref Range Status   11/21/2024 0.5 0.1 - 1.0 mg/dL Final     Comment:     For infants and newborns, interpretation of results should be based  on gestational age, weight and in agreement with clinical  observations.    Premature Infant recommended reference ranges:  Up to 24 hours.............<8.0 mg/dL  Up to 48 hours............<12.0 mg/dL  3-5 days..................<15.0 mg/dL  6-29 days.................<15.0 mg/dL       Alkaline Phosphatase   Date Value Ref Range Status   11/21/2024 145 (H) 55 - 135 U/L Final     AST   Date Value Ref Range Status   11/21/2024 17 10 - 40 U/L Final     ALT   Date Value Ref Range Status   11/21/2024 10 10 - 44 U/L  Final     Anion Gap   Date Value Ref Range Status   11/21/2024 9 8 - 16 mmol/L Final     eGFR if    Date Value Ref Range Status   02/18/2022 36.9 (A) >60 mL/min/1.73 m^2 Final     eGFR if non    Date Value Ref Range Status   02/18/2022 32.0 (A) >60 mL/min/1.73 m^2 Final     Comment:     Calculation used to obtain the estimated glomerular filtration  rate (eGFR) is the CKD-EPI equation.        Lab Results   Component Value Date    CEA 3.4 02/18/2022         Assessment/Plan:     Problem List Items Addressed This Visit          Cardiac/Vascular    Hypertension     BP today 154/98 patient to follow up with her pcp            Renal/    CKD (chronic kidney disease), stage III (Chronic)       Hematology    Bilateral pulmonary embolism - Primary     Patient continues on Pradaxa and is tolerating it well. Will continue yearly MD visit and labs. Refill Pradaxa today         Relevant Medications    dabigatran etexilate (PRADAXA) 75 mg Cap       GI    Constipation     Encouraged patient to increase fluid intake and use Colace BID and Miralax daily if colace not working.         Relevant Medications    docusate sodium (COLACE) 100 MG capsule       Discussion:     Follow up in about 1 year (around 12/31/2025) for with Dr. Williamson.      Electronically signed by Jacinda Lofton, MSN, APRN, AGNP-C, OCN

## 2024-12-31 ENCOUNTER — OFFICE VISIT (OUTPATIENT)
Facility: CLINIC | Age: 84
End: 2024-12-31
Payer: MEDICARE

## 2024-12-31 VITALS
HEART RATE: 97 BPM | OXYGEN SATURATION: 97 % | DIASTOLIC BLOOD PRESSURE: 98 MMHG | WEIGHT: 136.19 LBS | BODY MASS INDEX: 25.06 KG/M2 | SYSTOLIC BLOOD PRESSURE: 154 MMHG | HEIGHT: 62 IN | TEMPERATURE: 97 F

## 2024-12-31 DIAGNOSIS — K59.00 CONSTIPATION, UNSPECIFIED CONSTIPATION TYPE: ICD-10-CM

## 2024-12-31 DIAGNOSIS — N18.32 STAGE 3B CHRONIC KIDNEY DISEASE: Chronic | ICD-10-CM

## 2024-12-31 DIAGNOSIS — I10 PRIMARY HYPERTENSION: ICD-10-CM

## 2024-12-31 DIAGNOSIS — I26.99 BILATERAL PULMONARY EMBOLISM: Primary | ICD-10-CM

## 2024-12-31 PROCEDURE — 99999 PR PBB SHADOW E&M-EST. PATIENT-LVL IV: CPT | Mod: PBBFAC,,, | Performed by: NURSE PRACTITIONER

## 2024-12-31 PROCEDURE — 1160F RVW MEDS BY RX/DR IN RCRD: CPT | Mod: CPTII,S$GLB,, | Performed by: NURSE PRACTITIONER

## 2024-12-31 PROCEDURE — 99214 OFFICE O/P EST MOD 30 MIN: CPT | Mod: S$GLB,,, | Performed by: NURSE PRACTITIONER

## 2024-12-31 PROCEDURE — 1126F AMNT PAIN NOTED NONE PRSNT: CPT | Mod: CPTII,S$GLB,, | Performed by: NURSE PRACTITIONER

## 2024-12-31 PROCEDURE — 1159F MED LIST DOCD IN RCRD: CPT | Mod: CPTII,S$GLB,, | Performed by: NURSE PRACTITIONER

## 2024-12-31 PROCEDURE — G2211 COMPLEX E/M VISIT ADD ON: HCPCS | Mod: S$GLB,,, | Performed by: NURSE PRACTITIONER

## 2024-12-31 RX ORDER — DABIGATRAN ETEXILATE 75 MG/1
CAPSULE ORAL
Qty: 180 CAPSULE | Refills: 3 | Status: SHIPPED | OUTPATIENT
Start: 2024-12-31

## 2024-12-31 RX ORDER — DOCUSATE SODIUM 100 MG/1
100 CAPSULE, LIQUID FILLED ORAL 2 TIMES DAILY
Qty: 180 CAPSULE | Refills: 3 | Status: SHIPPED | OUTPATIENT
Start: 2024-12-31

## 2024-12-31 NOTE — ASSESSMENT & PLAN NOTE
Patient continues on Pradaxa and is tolerating it well. Will continue yearly MD visit and labs. Refill Pradaxa today

## 2024-12-31 NOTE — ASSESSMENT & PLAN NOTE
Encouraged patient to increase fluid intake and use Colace BID and Miralax daily if colace not working.

## (undated) DEVICE — ADHESIVE MASTISOL VIAL 48/BX

## (undated) DEVICE — TRAY MINOR GEN SURG

## (undated) DEVICE — GOWN SURGICAL X-LARGE

## (undated) DEVICE — SKINMARKER & RULER REGULAR X-F

## (undated) DEVICE — SUT MCRYL PLUS 4-0 PS2 27IN

## (undated) DEVICE — NDL HYPO REG 25G X 1 1/2

## (undated) DEVICE — CLOSURE SKIN 1X5 STERI-STRIP

## (undated) DEVICE — SEE MEDLINE ITEM 157128

## (undated) DEVICE — PACK UNIVERSAL SPLIT II

## (undated) DEVICE — GOWN AERO CHROME W/ TOWEL XL

## (undated) DEVICE — SYR 10CC LUER LOCK

## (undated) DEVICE — SEE MEDLINE ITEM 152622

## (undated) DEVICE — ELECTRODE REM PLYHSV RETURN 9

## (undated) DEVICE — SEE MEDLINE ITEM 152572